# Patient Record
Sex: FEMALE | Race: WHITE | Employment: FULL TIME | URBAN - METROPOLITAN AREA
[De-identification: names, ages, dates, MRNs, and addresses within clinical notes are randomized per-mention and may not be internally consistent; named-entity substitution may affect disease eponyms.]

---

## 2017-04-25 PROBLEM — B00.9 HERPES SIMPLEX VIRUS TYPE 2 (HSV-2) INFECTION AFFECTING PREGNANCY IN FIRST TRIMESTER, ANTEPARTUM: Status: ACTIVE | Noted: 2017-04-25

## 2017-04-25 PROBLEM — N97.9 INFERTILITY, FEMALE: Status: ACTIVE | Noted: 2017-04-25

## 2017-04-25 PROBLEM — E03.9 ACQUIRED HYPOTHYROIDISM: Status: ACTIVE | Noted: 2017-04-25

## 2017-04-25 PROBLEM — O09.291 HISTORY OF PREMATURE RUPTURE OF MEMBRANES IN PREVIOUS PREGNANCY, CURRENTLY PREGNANT IN FIRST TRIMESTER: Status: ACTIVE | Noted: 2017-04-25

## 2017-04-25 PROBLEM — O09.291 HISTORY OF PREMATURE RUPTURE OF MEMBRANES IN PREVIOUS PREGNANCY, CURRENTLY PREGNANT IN FIRST TRIMESTER: Status: RESOLVED | Noted: 2017-04-25 | Resolved: 2017-04-25

## 2017-04-25 PROBLEM — Z34.90 PREGNANCY: Status: ACTIVE | Noted: 2017-04-25

## 2017-04-25 PROBLEM — O30.041 DICHORIONIC DIAMNIOTIC TWIN PREGNANCY IN FIRST TRIMESTER: Status: ACTIVE | Noted: 2017-04-25

## 2017-04-25 PROBLEM — Z82.79 FAMILY HISTORY OF CLEFT PALATE: Status: ACTIVE | Noted: 2017-04-25

## 2017-04-25 PROBLEM — O98.511 HERPES SIMPLEX VIRUS TYPE 2 (HSV-2) INFECTION AFFECTING PREGNANCY IN FIRST TRIMESTER, ANTEPARTUM: Status: ACTIVE | Noted: 2017-04-25

## 2017-04-25 PROBLEM — O09.891 HISTORY OF PRETERM DELIVERY, CURRENTLY PREGNANT IN FIRST TRIMESTER: Status: ACTIVE | Noted: 2017-04-25

## 2017-04-25 PROBLEM — O09.291 HISTORY OF PRETERM PREMATURE RUPTURE OF MEMBRANES (PROM) IN PREVIOUS PREGNANCY, CURRENTLY PREGNANT IN FIRST TRIMESTER: Status: ACTIVE | Noted: 2017-04-25

## 2017-04-25 PROBLEM — Z98.890 HISTORY OF LOOP ELECTRICAL EXCISION PROCEDURE (LEEP): Status: ACTIVE | Noted: 2017-04-25

## 2017-05-18 PROBLEM — E07.9 THYROID DYSFUNCTION IN PREGNANCY IN FIRST TRIMESTER: Status: ACTIVE | Noted: 2017-04-25

## 2017-05-18 PROBLEM — O35.2XX0: Status: ACTIVE | Noted: 2017-05-18

## 2017-05-18 PROBLEM — O99.281 THYROID DYSFUNCTION IN PREGNANCY IN FIRST TRIMESTER: Status: ACTIVE | Noted: 2017-04-25

## 2017-05-18 PROBLEM — O34.41 HISTORY OF LEEP (LOOP ELECTROSURGICAL EXCISION PROCEDURE) OF CERVIX COMPLICATING PREGNANCY IN FIRST TRIMESTER: Status: ACTIVE | Noted: 2017-04-25

## 2017-05-18 PROBLEM — O09.90 HRP (HIGH RISK PREGNANCY): Status: ACTIVE | Noted: 2017-04-25

## 2017-06-08 PROBLEM — O34.42 HISTORY OF LOOP ELECTROSURGICAL EXCISION PROCEDURE AFFECTING CARE OF MOTHER IN SECOND TRIMESTER, ANTEPARTUM: Status: ACTIVE | Noted: 2017-04-25

## 2017-06-08 PROBLEM — O09.92 HIGH-RISK PREGNANCY IN SECOND TRIMESTER: Status: ACTIVE | Noted: 2017-04-25

## 2017-06-08 PROBLEM — O09.292 HISTORY OF PRETERM PREMATURE RUPTURE OF MEMBRANES (PROM) IN PREVIOUS PREGNANCY, CURRENTLY PREGNANT IN SECOND TRIMESTER: Status: ACTIVE | Noted: 2017-04-25

## 2017-06-08 PROBLEM — O99.282 THYROID DYSFUNCTION IN PREGNANCY IN SECOND TRIMESTER: Status: ACTIVE | Noted: 2017-04-25

## 2017-06-08 PROBLEM — O98.512 HERPES SIMPLEX VIRUS TYPE 2 (HSV-2) INFECTION AFFECTING PREGNANCY IN SECOND TRIMESTER, ANTEPARTUM: Status: ACTIVE | Noted: 2017-04-25

## 2017-06-08 PROBLEM — O09.812 PREGNANCY RESULTING FROM ASSISTED REPRODUCTIVE TECHNOLOGY IN SECOND TRIMESTER: Status: ACTIVE | Noted: 2017-04-25

## 2017-06-08 PROBLEM — O44.02 PLACENTA PREVIA SPECIFIED AS WITHOUT HEMORRHAGE IN SECOND TRIMESTER: Status: ACTIVE | Noted: 2017-06-08

## 2017-06-08 PROBLEM — O09.212 PREVIOUS PRETERM DELIVERY IN SECOND TRIMESTER, ANTEPARTUM: Status: ACTIVE | Noted: 2017-04-25

## 2017-06-08 PROBLEM — O30.042 DICHORIONIC DIAMNIOTIC TWIN PREGNANCY IN SECOND TRIMESTER: Status: ACTIVE | Noted: 2017-04-25

## 2017-08-16 PROBLEM — O44.02 PLACENTA PREVIA SPECIFIED AS WITHOUT HEMORRHAGE IN SECOND TRIMESTER: Status: RESOLVED | Noted: 2017-06-08 | Resolved: 2017-08-16

## 2017-09-08 PROBLEM — O99.283 THYROID DYSFUNCTION IN PREGNANCY IN THIRD TRIMESTER: Status: ACTIVE | Noted: 2017-04-25

## 2017-09-08 PROBLEM — O09.213 PREVIOUS PRETERM DELIVERY IN THIRD TRIMESTER, ANTEPARTUM: Status: ACTIVE | Noted: 2017-04-25

## 2017-09-08 PROBLEM — O09.93 HIGH-RISK PREGNANCY IN THIRD TRIMESTER: Status: ACTIVE | Noted: 2017-04-25

## 2017-09-08 PROBLEM — O98.513 HERPES SIMPLEX VIRUS TYPE 2 (HSV-2) INFECTION AFFECTING PREGNANCY IN THIRD TRIMESTER, ANTEPARTUM: Status: ACTIVE | Noted: 2017-04-25

## 2017-09-08 PROBLEM — O09.293 HISTORY OF PRETERM PREMATURE RUPTURE OF MEMBRANES (PROM) IN PREVIOUS PREGNANCY, CURRENTLY PREGNANT IN THIRD TRIMESTER: Status: ACTIVE | Noted: 2017-04-25

## 2017-09-08 PROBLEM — O34.43 HISTORY OF LOOP ELECTROSURGICAL EXCISION PROCEDURE (LEEP) OF CERVIX AFFECTING PREGNANCY IN THIRD TRIMESTER: Status: ACTIVE | Noted: 2017-04-25

## 2017-09-08 PROBLEM — O09.813 PREGNANCY RESULTING FROM ASSISTED REPRODUCTIVE TECHNOLOGY IN THIRD TRIMESTER: Status: ACTIVE | Noted: 2017-04-25

## 2017-09-08 PROBLEM — O30.043 DICHORIONIC DIAMNIOTIC TWIN PREGNANCY IN THIRD TRIMESTER: Status: ACTIVE | Noted: 2017-04-25

## 2017-10-04 PROBLEM — Z23 ENCOUNTER FOR IMMUNIZATION: Status: ACTIVE | Noted: 2017-10-04

## 2017-10-09 ENCOUNTER — HOSPITAL ENCOUNTER (EMERGENCY)
Age: 32
Discharge: HOME | End: 2017-10-09
Attending: OBSTETRICS & GYNECOLOGY | Admitting: OBSTETRICS & GYNECOLOGY
Payer: COMMERCIAL

## 2017-10-09 VITALS
RESPIRATION RATE: 20 BRPM | SYSTOLIC BLOOD PRESSURE: 114 MMHG | HEIGHT: 65 IN | HEART RATE: 95 BPM | DIASTOLIC BLOOD PRESSURE: 63 MMHG

## 2017-10-09 PROBLEM — O47.03 PRETERM UTERINE CONTRACTIONS IN THIRD TRIMESTER, ANTEPARTUM: Status: ACTIVE | Noted: 2017-10-09

## 2017-10-09 LAB — FIBRONECTIN FETAL VAG QL: POSITIVE

## 2017-10-09 PROCEDURE — 59025 FETAL NON-STRESS TEST: CPT

## 2017-10-09 PROCEDURE — 99282 EMERGENCY DEPT VISIT SF MDM: CPT

## 2017-10-09 PROCEDURE — 87086 URINE CULTURE/COLONY COUNT: CPT | Performed by: OBSTETRICS & GYNECOLOGY

## 2017-10-09 PROCEDURE — 82731 ASSAY OF FETAL FIBRONECTIN: CPT | Performed by: OBSTETRICS & GYNECOLOGY

## 2017-10-09 NOTE — IP AVS SNAPSHOT
[address] 
[phone] Patient: Charla Mars MRN: [id number] FWU: You are allergic to the following No active allergies Recent Documentation Height OB Status Smoking Status 1.651 m Pregnant Never Smoker Unresulted Labs Order Current Status CULTURE, URINE In process Emergency Contacts Name Discharge Info Relation Home Work Mobile Nikki Mars  Parent [1] [phone] About your hospitalization You were admitted on:  N/A You last received care in the:  SFE 4 ANTEPARTUM You were discharged on:  2017 Unit phone number:  [phone] Why you were hospitalized Your primary diagnosis was:  Not on File Your diagnoses also included:   Uterine Contractions In Third Trimester, Antepartum Providers Seen During Your Hospitalizations Provider Role Specialty Primary office phone Cathi Pleitez DO Attending Provider Obstetrics & Gynecology [phone] Your Primary Care Physician (PCP) Primary Care Physician Office Phone Office Fax OTHER, PHYS ** None ** ** None ** Follow-up Information Follow up With Details Comments Contact Info Julia Mcintosh MD   Patient can only remember the practice name and not the physician Your Appointments 2017  8:30 AM EDT BPPx2 with [employer] US [address] [place] [address] [phone] 2017  9:15 AM EDT  
OB VISIT with Carlene Shoulders Alt, DO  
[address] [place] [address] [phone] 2017  8:30 AM EDT  
GROWTH TWINS with UM ULTRASOUND 3  
[facility] MATERNAL FETAL MEDICINE ([address]) [address] [place] [address] [address] [address]  
[phone] Friday October 20, 2017  9:30 AM EDT  
OB VISIT with Maye Waller MD  
[address] ([address]) [address] [facility] [address]  
[phone] Wednesday October 25, 2017 10:15 AM EDT BPPx2 with [employer] US [address] [address]  
[phone] Wednesday October 25, 2017 11:15 AM EDT  
OB VISIT with Hafsa Mederos MD  
[facility] ([address]) [address] [phone] Wednesday November 01, 2017  8:30 AM EDT BPPx2 with [employer] US [address] [phone] Wednesday November 01, 2017  9:30 AM EDT  
OB VISIT with Mandy Edmondson MD  
[facility] ([address]) [address] [phone] Wednesday November 08, 2017  8:30 AM EST BPPx2 with [employer] US [address] [address]  
[phone] Wednesday November 08, 2017  9:15 AM EST  
OB VISIT with Wanda Felty, MD  
[address] [address]  
[phone] Current Discharge Medication List  
  
ASK your doctor about these medications Dose & Instructions Dispensing Information Comments Morning Noon Evening Bedtime  
 ascorbic acid (vitamin C) 500 mg tablet Commonly known as:  VITAMIN C Your last dose was: Your next dose is: Take  by mouth. Refills:  0  
     
   
   
   
  
 aspirin 81 mg chewable tablet Your last dose was: Your next dose is:    
   
   
 Dose:  81 mg Take 81 mg by mouth daily. Refills:  0 CALCIUM PO Your last dose was: Your next dose is: Take  by mouth. Refills:  0  
     
   
   
   
  
 cholecalciferol 1,000 unit Cap Commonly known as:  VITAMIN D3 Your last dose was: Your next dose is: Take  by mouth daily. Refills:  0  
     
   
   
   
  
 folic acid 185 mcg tablet Your last dose was: Your next dose is:    
   
   
 Dose:  800 mcg Take 800 mcg by mouth daily. Refills:  0 IRON PO Your last dose was: Your next dose is: Take  by mouth. Refills:  0  
     
   
   
   
  
 levothyroxine 200 mcg tablet Commonly known as:  SYNTHROID Your last dose was: Your next dose is: Take  by mouth Daily (before breakfast). Refills:  0 PNV-DHA PO Your last dose was: Your next dose is: Take  by mouth. Refills:  0  
     
   
   
   
  
 valACYclovir 500 mg tablet Commonly known as:  VALTREX Your last dose was: Your next dose is: Take 1 tab BID x3d as needed. Quantity:  30 Tab Refills:  5 VITAMIN B COMPLEX NO.12-NIACIN PO Your last dose was: Your next dose is: Take  by mouth. Refills:  0 Discharge Instructions Weeks 32 to 34 of Your Pregnancy: Care Instructions Your Care Instructions During the last few weeks of your pregnancy, you may have more aches and pains. It's important to rest when you can. Your growing baby is putting more pressure on your bladder. So you may need to urinate more often. Hemorrhoids are also common. These are painful, itchy veins in the rectal area. In the 36th week, most women have a test for group B streptococcus (GBS). GBS is a common bacteria that can live in the vagina and rectum.  It can make your baby sick after birth. If you test positive, you will get antibiotics during labor. These will keep your baby from getting the bacteria. You may want to talk with your doctor about banking your baby's umbilical cord blood. This is the blood left in the cord after birth. If you want to save this blood, you must arrange it ahead of time. You can't decide at the last minute. If you haven't already had the Tdap shot during this pregnancy, talk to your doctor about getting it. It will help protect your  against pertussis infection. Follow-up care is a key part of your treatment and safety. Be sure to make and go to all appointments, and call your doctor if you are having problems. It's also a good idea to know your test results and keep a list of the medicines you take. How can you care for yourself at home? Ease hemorrhoids · Get more liquids, fruits, vegetables, and fiber in your diet. This will help keep your stools soft. · Avoid sitting for too long. Lie on your left side several times a day. · Clean yourself with soft, moist toilet paper. Or you can use witch hazel pads or personal hygiene pads. · If you are uncomfortable, try ice packs. Or you can sit in a warm sitz bath. Do these for 20 minutes at a time, as needed. · Use hydrocortisone cream for pain and itching. Two examples are Anusol and Preparation H Hydrocortisone. · Ask your doctor about taking an over-the-counter stool softener. Consider breastfeeding · Experts recommend that women breastfeed for 1 year or longer. Breast milk is the perfect food for babies. · Breast milk is easier for babies to digest than formula. And it is always available, just the right temperature, and free. · In general, babies who are  are healthier than formula-fed babies. ¨  babies are less likely to get ear infections, colds, diarrhea, and pneumonia.  
¨  babies who are fed only breast milk are less likely to get asthma and allergies. ¨  babies are less likely to be obese. ¨  babies are less likely to get diabetes or heart disease. · Women who breastfeed have less bleeding after the birth. Their uteruses also shrink back faster. · Some women who breastfeed lose weight faster. Making milk burns calories. · Breastfeeding can lower your risk of breast cancer, ovarian cancer, and osteoporosis. Decide about circumcision for boys · As you make this decision, it may help to think about your personal, Taoist, and family traditions. You get to decide if you will keep your son's penis natural or if he will be circumcised. · If you decide that you would like to have your baby circumcised, talk with your doctor. You can share your concerns about pain. And you can discuss your preferences for anesthesia. Where can you learn more? Go to http://sowmya-marisol.info/. Enter G327 in the search box to learn more about \"Weeks 32 to 34 of Your Pregnancy: Care Instructions. \" Current as of: March 16, 2017 Content Version: 11.3 © 7702-6156 [employer]. Care instructions adapted under license by [facility] (which disclaims liability or warranty for this information). If you have questions about a medical condition or this instruction, always ask your healthcare professional. [place] [address] any warranty or liability for your use of this information. Discharge Orders None Introducing [facility]! [employer] introduces [facility] patient portal. Now you can access parts of your medical record, email your doctor's office, and request medication refills online. 1. In your internet browser, go to [email or web address] 2. Click on the First Time User? Click Here link in the Sign In box. You will see the New Member Sign Up page. 3. Enter your [facility] Access Code exactly as it appears below.  You will not need to use this code after youve completed the sign-up process. If you do not sign up before the expiration date, you must request a new code. · "[employer]" Access Code: [email or web address] Expires: 1/7/2018  4:11 PM 
 
4. Enter the last four digits of your Social Security Number (xxxx) and Date of Birth (mm/dd/yyyy) as indicated and click Submit. You will be taken to the next sign-up page. 5. Create a "[employer]" ID. This will be your "[employer]" login ID and cannot be changed, so think of one that is secure and easy to remember. 6. Create a "[employer]" password. You can change your password at any time. 7. Enter your Password Reset Question and Answer. This can be used at a later time if you forget your password. 8. Enter your e-mail address. You will receive e-mail notification when new information is available in [address] 9. Click Sign Up. You can now view and download portions of your medical record. 10. Click the Download Summary menu link to download a portable copy of your medical information. If you have questions, please visit the Frequently Asked Questions section of the "[employer]" website. Remember, "[employer]" is NOT to be used for urgent needs. For medical emergencies, dial 911. Now available from your iPhone and Android! General Information Please provide this summary of care documentation to your next provider. Patient Signature:  ____________________________________________________________ Date:  ____________________________________________________________  
  
[facility] Provider Signature:  ____________________________________________________________ Date:  ____________________________________________________________

## 2017-10-09 NOTE — IP AVS SNAPSHOT
Summary of Care Report The Summary of Care report has been created to help improve care coordination. Users with access to [employer] or [address] (Web-based application) may access additional patient information including the Discharge Summary. If you are not currently a [address] user and need more information, please call the number listed below in the [place] [address] section and ask to be connected with Medical Records. Facility Information Name Address Phone [address] [place] [address] [address] [place] [address] [phone] Patient Information Patient Name Sex CYNTHIA Jovel Both ([id number]) Female 1985 Discharge Information Admitting Provider Service Area Unit Martinez Lynn MD / [facility] 4 Antepartum / [phone] Discharge Provider Discharge Date/Time Discharge Disposition Destination (none) 10/9/2017 (Pending) AHR (none) Patient Language Language ENGLISH [13] Hospital Problems as of 10/9/2017  Reviewed: 2017  9:26 AM by Anamaria Day MD  
  
  
  
 Class Noted - Resolved Last Modified POA Active Problems  uterine contractions in third trimester, antepartum  10/9/2017 - Present 10/9/2017 by Martinez Lynn MD Unknown Entered by Martinez Lynn MD  
  
Non-Hospital Problems as of 10/9/2017  Reviewed: 2017  9:26 AM by Anamaria Day MD  
  
  
  
 Class Noted - Resolved Last Modified Active Problems Pregnancy resulting from assisted reproductive technology in third trimester  2017 - Present 2017 by Charito Bernard RN Entered by Franky Whitlock Overview Addendum 2017  9:48 AM by Franky Whitlock Patient was referred to [facility] [address]  Patient states she had ovulation medication/shots x 1 round. Records requested. Previous  delivery in third trimester, antepartum  2017 - Present 2017 by Jana Mccall MD  
  Entered by Moncho Cabrera Overview Addendum 2017  9:19 AM by Jana Mccall MD  
    
2017 at [facility]:  CL not done today. Per patient was 3.3 in primary OB's office. · No additional endovaginal CL's needed. · PTL and cervical insufficiency precautions given. History of loop electrosurgical excision procedure (LEEP) of cervix affecting pregnancy in third trimester  2017 - Present 2017 by Steven Eisenmenger, RN Entered by Moncho Cabrera Overview Addendum 2017 10:15 AM by Steven Eisenmenger, RN Hx of LEEP in 2005 See  Delivery Overview Dichorionic diamniotic twin pregnancy in third trimester  2017 - Present 2017 by Jana Mccall MD  
  Entered by Moncho Ket Overview Addendum 2017  9:18 AM by Jana Mccall MD  
   Dichorionic/Diamniotic Twin Pregnancy which was secondary to infertility treatment 2017 at [facility]:  Appropriate fetal growth x 2, Normal GUILLE (DVP) x 2, Reassuring fetal status x 2 A:  AC 55%, overall 56%, GUILLE (DVP) 3.3 cm, UA Dopplers WNL, BPP 8/8 B:  AC 49%, overall 46%, GUILLE (DVP) 5.8 cm, UA Dopplers WNL, BPP 8/8 CL:  Not done · Repeat US at [facility] for growth in 3 weeks. · Plan delivery at 38 weeks. Thyroid dysfunction in pregnancy in third trimester  2017 - Present 2017 by Jana Mccall MD  
  Entered by Moncho Cabrera Overview Addendum 2017  9:25 AM by Jana Mccall MD  
   Patient is followed by an Endocrinologist, Dr. Pamela Galindo. She recently had blood work drawn. Patient signed records release to have results sent to our office TSH 17-3.47 T4-11.0 [facility] Labs 2017 TSH 3.47 (H) 0.56   0.33 FT4 --  --  0.82    
 
 
 2017 at [facility]:  Currently taking Synthroid 200 mcg Mon to Fri and 175 mcg Sat and Sun.  TFT's not available at today's visit. · Recommend evaluation of FT4 each trimester. In pregnancy, want in high normal range. Herpes simplex virus type 2 (HSV-2) infection affecting pregnancy in third trimester, antepartum  2017 - Present 2017 by Sheila Odonnell RN Entered by Justo Friends Overview Signed 2017  9:50 AM by Justo Castro 36 weeks Valtrex _____ Family history of cleft palate  2017 - Present 2017 by Joseph Fowler MD  
  Entered by AdaMandae Overview Addendum 2017 11:14 AM by Germán Garcia MD  
   Patient's father [id number] anatomy at [facility] to be scheduled. History of  premature rupture of membranes (PROM) in previous pregnancy, currently pregnant in third trimester  2017 - Present 2017 by Germán Garcia MD  
  Entered by Justo Offermobi Overview Addendum 2017 11:52 AM by Germán Garcia MD  
   Patient delivered at  35 weeks x 2. High-risk pregnancy in third trimester  2017 - Present 10/4/2017 by Marianne Caruso DO Entered by Ada Offermobi Overview Addendum 10/4/2017  9:05 AM by Marianne Caruso DO Patient declines genetic testing 28 week glucola __93___ Previous child born with ventricular septal defect, currently pregnant  2017 - Present 2017 by Germán Garcia MD  
  Entered by Jennifer Fowler Overview Addendum 2017 12:42 PM by Germán Garcia MD  
   2017-[facility]-Normal echo on both twins today. · Repeat echos in 6 weeks at [facility] Encounter for immunization  10/4/2017 - Present 10/4/2017 by Iman Mckinley Entered by Iman Mckinley Overview Signed 10/4/2017  9:22 AM by Iman Mckinley Flu vaccine given on 10/04/17 You are allergic to the following No active allergies Current Discharge Medication List  
  
ASK your doctor about these medications Dose & Instructions Dispensing Information Comments  
 ascorbic acid (vitamin C) 500 mg tablet Commonly known as:  VITAMIN C Take  by mouth. Refills:  0  
   
 aspirin 81 mg chewable tablet Dose:  81 mg Take 81 mg by mouth daily. Refills:  0  
   
 CALCIUM PO Take  by mouth. Refills:  0  
   
 cholecalciferol 1,000 unit Cap Commonly known as:  VITAMIN D3 Take  by mouth daily. Refills:  0  
   
 folic acid 011 mcg tablet Dose:  800 mcg Take 800 mcg by mouth daily. Refills:  0 IRON PO Take  by mouth. Refills:  0  
   
 levothyroxine 200 mcg tablet Commonly known as:  SYNTHROID Take  by mouth Daily (before breakfast). Refills:  0 PNV-DHA PO Take  by mouth. Refills:  0  
   
 valACYclovir 500 mg tablet Commonly known as:  VALTREX Take 1 tab BID x3d as needed. Quantity:  30 Tab Refills:  5 VITAMIN B COMPLEX NO.12-NIACIN PO Take  by mouth. Refills:  0 Current Immunizations Name Date Influenza Vaccine (Quad) Mdck Pf 10/4/2017 Influenza Vaccine PF 1/23/2013 Tdap 1/23/2013 Follow-up Information Follow up With Details Comments Contact Info Phys Other, MD   Patient can only remember the practice name and not the physician Discharge Instructions Weeks 32 to 34 of Your Pregnancy: Care Instructions Your Care Instructions During the last few weeks of your pregnancy, you may have more aches and pains. It's important to rest when you can. Your growing baby is putting more pressure on your bladder. So you may need to urinate more often. Hemorrhoids are also common. These are painful, itchy veins in the rectal area. In the 36th week, most women have a test for group B streptococcus (GBS). GBS is a common bacteria that can live in the vagina and rectum.  It can make your baby sick after birth. If you test positive, you will get antibiotics during labor. These will keep your baby from getting the bacteria. You may want to talk with your doctor about banking your baby's umbilical cord blood. This is the blood left in the cord after birth. If you want to save this blood, you must arrange it ahead of time. You can't decide at the last minute. If you haven't already had the Tdap shot during this pregnancy, talk to your doctor about getting it. It will help protect your  against pertussis infection. Follow-up care is a key part of your treatment and safety. Be sure to make and go to all appointments, and call your doctor if you are having problems. It's also a good idea to know your test results and keep a list of the medicines you take. How can you care for yourself at home? Ease hemorrhoids · Get more liquids, fruits, vegetables, and fiber in your diet. This will help keep your stools soft. · Avoid sitting for too long. Lie on your left side several times a day. · Clean yourself with soft, moist toilet paper. Or you can use witch hazel pads or personal hygiene pads. · If you are uncomfortable, try ice packs. Or you can sit in a warm sitz bath. Do these for 20 minutes at a time, as needed. · Use hydrocortisone cream for pain and itching. Two examples are Anusol and Preparation H Hydrocortisone. · Ask your doctor about taking an over-the-counter stool softener. Consider breastfeeding · Experts recommend that women breastfeed for 1 year or longer. Breast milk is the perfect food for babies. · Breast milk is easier for babies to digest than formula. And it is always available, just the right temperature, and free. · In general, babies who are  are healthier than formula-fed babies. ¨  babies are less likely to get ear infections, colds, diarrhea, and pneumonia.  
¨  babies who are fed only breast milk are less likely to get asthma and allergies. ¨  babies are less likely to be obese. ¨  babies are less likely to get diabetes or heart disease. · Women who breastfeed have less bleeding after the birth. Their uteruses also shrink back faster. · Some women who breastfeed lose weight faster. Making milk burns calories. · Breastfeeding can lower your risk of breast cancer, ovarian cancer, and osteoporosis. Decide about circumcision for boys · As you make this decision, it may help to think about your personal, Hindu, and family traditions. You get to decide if you will keep your son's penis natural or if he will be circumcised. · If you decide that you would like to have your baby circumcised, talk with your doctor. You can share your concerns about pain. And you can discuss your preferences for anesthesia. Where can you learn more? Go to http://sowmya-marisol.info/. Enter H443 in the search box to learn more about \"Weeks 32 to 34 of Your Pregnancy: Care Instructions. \" Current as of: March 16, 2017 Content Version: 11.3 © 8434-4308 [employer]. Care instructions adapted under license by [employer] (which disclaims liability or warranty for this information). If you have questions about a medical condition or this instruction, always ask your healthcare professional. [place] [address] any warranty or liability for your use of this information. Chart Review Routing History No Routing History on File

## 2017-10-09 NOTE — PROGRESS NOTES
Pt presents to unit with c/o contractions, lower back pain and abdominal pressure, starting last night, worsening today. EFM and toco applied. Large amt of ketones and moderate leukocytes noted in urine dip. Dr. Jacqueline Thompson in to see pt. Report to MARIANNA Luke RN, care relinquished.

## 2017-10-09 NOTE — PROGRESS NOTES
Dr Caridad Roberto at . FFN collected and SVE. Closed. Urine dipped- large ketones and mod leukocytes. Orders for UA clean catch, crackers, jucie/ water. Per Dr Caridad Roberto- babies reactive.  cardios removed, toco remains in place

## 2017-10-09 NOTE — ED PROVIDER NOTES
Chief Complaint:  contractions    28 y.o. female [id number] with twins at 33w2d  weeks gestation who is seen for mild abdominal pain. Pt reports contractions last night that resolved. Today reports \"i lost my mucous plug\". Currently having mild cramping. No vag bleeding or loss of fluid. Good fetal movement X2        HISTORY:    History   Sexual Activity    Sexual activity: Yes    Partners: Male    Birth control/ protection: None     Patient's last menstrual period was 2017. Social History     Social History    Marital status: SINGLE     Spouse name: N/A    Number of children: N/A    Years of education: N/A     Occupational History    Not on file.      Social History Main Topics    Smoking status: Never Smoker    Smokeless tobacco: Never Used    Alcohol use No    Drug use: No    Sexual activity: Yes     Partners: Male     Birth control/ protection: None     Other Topics Concern    Not on file     Social History Narrative       Past Surgical History:   Procedure Laterality Date    BREAST SURGERY PROCEDURE UNLISTED      breast augmentation     HX BREAST AUGMENTATION  2008    HX COLPOSCOPY      HX LEEP PROCEDURE      HX OTHER SURGICAL      Fulguration of Endometriosis    HX OTHER SURGICAL  2015    Bx to intestines (celiac dz)    HX PELVIC LAPAROSCOPY  , ,     HX WISDOM TEETH EXTRACTION  2007       Past Medical History:   Diagnosis Date    Abnormal Pap smear 2011 & 2015    HPV    Abnormal Papanicolaou smear of cervix     2005     2004    Anemia     Asthma     childhood    Celiac disease     Depression     no meds    Endometriosis     Female dyspareunia     Herpes simplex without mention of complication     Type 2    History of hyperthyroidism 3/2014    treated w/radiation    Hypothyroidism     managed w/med    Infertility, female     Miscarriage     Pelvic pain in female 10/8/2015         ROS:  A 12 point review of symptoms negative except for chief complaint as described above. PHYSICAL EXAM:  Blood pressure 114/63, pulse 95, resp. rate 20, height 5' 5\" (1.651 m), last menstrual period 2017. Constitutional: The patient appears well, alert, oriented x 3. Cardiovascular: Heart RRR, no murmurs.    Respiratory: Lungs clear, no respiratory distress  GI: Abdomen soft, nontender, no guarding  No fundal tenderness  Musculoskeletal: no cva tenderness  Upper ext: no edema, reflexes +2  Lower ext: no edema, neg hussain's, reflexes +2  Skin: no rashes or lesions  Psychiatric:Mood/ Affect: appropriate  Genitourinary: SVE:cl/th  FHT:reactive X 2  TOCO:iritability, no contractions    I personally reviewed pt's medical record including relevant labs    ffn +    Assessment/Plan:  29 yo [id number] at 33w2d with  contractions without cervical change  Resolved  FFN+  Labor precautions- keep f/u as scheduled Wednesday or sooner if needed  + urine ketones- encouraged hydration  Urine culture sent

## 2017-10-09 NOTE — PROGRESS NOTES
Dr. Monica Crowley at [facility], [address] [facility] discussed, pt verbalizes understanding. Discharge orders received.

## 2017-10-12 LAB
BACTERIA SPEC CULT: NORMAL
SERVICE CMNT-IMP: NORMAL

## 2017-10-16 ENCOUNTER — ANESTHESIA (OUTPATIENT)
Dept: LABOR AND DELIVERY | Age: 32
End: 2017-10-16
Payer: COMMERCIAL

## 2017-10-16 ENCOUNTER — ANESTHESIA EVENT (OUTPATIENT)
Dept: LABOR AND DELIVERY | Age: 32
End: 2017-10-16
Payer: COMMERCIAL

## 2017-10-16 ENCOUNTER — HOSPITAL ENCOUNTER (INPATIENT)
Age: 32
LOS: 3 days | Discharge: HOME | End: 2017-10-19
Attending: OBSTETRICS & GYNECOLOGY | Admitting: OBSTETRICS & GYNECOLOGY
Payer: COMMERCIAL

## 2017-10-16 DIAGNOSIS — O60.03 PRETERM LABOR IN THIRD TRIMESTER WITHOUT DELIVERY: Primary | ICD-10-CM

## 2017-10-16 DIAGNOSIS — Z98.891 S/P CESAREAN SECTION: ICD-10-CM

## 2017-10-16 PROBLEM — O26.893 ABDOMINAL PAIN DURING PREGNANCY, THIRD TRIMESTER: Status: ACTIVE | Noted: 2017-10-16

## 2017-10-16 PROBLEM — O30.003 TWIN GESTATION IN THIRD TRIMESTER: Status: ACTIVE | Noted: 2017-10-16

## 2017-10-16 PROBLEM — Z34.90 PREGNANCY: Status: ACTIVE | Noted: 2017-10-16

## 2017-10-16 PROBLEM — R10.9 ABDOMINAL PAIN DURING PREGNANCY, THIRD TRIMESTER: Status: ACTIVE | Noted: 2017-10-16

## 2017-10-16 LAB
ABO + RH BLD: NORMAL
BASE DEFICIT BLDCOA-SCNC: 3.1 MMOL/L (ref 0–2)
BASE DEFICIT BLDCOV-SCNC: 0.5 MMOL/L (ref 1.9–7.7)
BASE DEFICIT BLDCOV-SCNC: 1.6 MMOL/L (ref 1.9–7.7)
BDY SITE: ABNORMAL
BLOOD GROUP ANTIBODIES SERPL: NORMAL
ERYTHROCYTE [DISTWIDTH] IN BLOOD BY AUTOMATED COUNT: 13.3 % (ref 11.9–14.6)
HCO3 BLDCOA-SCNC: 24 MMOL/L (ref 22–26)
HCO3 BLDV-SCNC: 23 MMOL/L
HCO3 BLDV-SCNC: 24 MMOL/L
HCT VFR BLD AUTO: 41.2 % (ref 35.8–46.3)
HGB BLD-MCNC: 14.4 G/DL (ref 11.7–15.4)
MCH RBC QN AUTO: 33.3 PG (ref 26.1–32.9)
MCHC RBC AUTO-ENTMCNC: 35 G/DL (ref 31.4–35)
MCV RBC AUTO: 95.4 FL (ref 79.6–97.8)
PCO2 BLDCOA: 49 MMHG (ref 33–49)
PCO2 BLDCOV: 36 MMHG (ref 14.1–43.3)
PCO2 BLDCOV: 43 MMHG (ref 14.1–43.3)
PH BLDCOA: 7.3 [PH] (ref 7.21–7.31)
PH BLDCOV: 7.36 [PH] (ref 7.2–7.44)
PH BLDCOV: 7.43 [PH] (ref 7.2–7.44)
PLATELET # BLD AUTO: 155 K/UL (ref 150–450)
PMV BLD AUTO: 11.6 FL (ref 10.8–14.1)
PO2 BLDCOA: 22 MMHG (ref 9–19)
PO2 BLDV: 27 MMHG (ref 30.4–57.2)
PO2 BLDV: 41 MMHG (ref 30.4–57.2)
RBC # BLD AUTO: 4.32 M/UL (ref 4.05–5.25)
SERVICE CMNT-IMP: ABNORMAL
SPECIMEN EXP DATE BLD: NORMAL
WBC # BLD AUTO: 11.3 K/UL (ref 4.3–11.1)

## 2017-10-16 PROCEDURE — 74011250636 HC RX REV CODE- 250/636: Performed by: OBSTETRICS & GYNECOLOGY

## 2017-10-16 PROCEDURE — 99283 EMERGENCY DEPT VISIT LOW MDM: CPT

## 2017-10-16 PROCEDURE — 85027 COMPLETE CBC AUTOMATED: CPT | Performed by: OBSTETRICS & GYNECOLOGY

## 2017-10-16 PROCEDURE — 74011250637 HC RX REV CODE- 250/637: Performed by: ANESTHESIOLOGY

## 2017-10-16 PROCEDURE — 77030007880 HC KT SPN EPDRL BBMI -B: Performed by: ANESTHESIOLOGY

## 2017-10-16 PROCEDURE — 74011250636 HC RX REV CODE- 250/636: Performed by: ANESTHESIOLOGY

## 2017-10-16 PROCEDURE — 77030009363 HC CUP VAC EXTRCT CNCI -B: Performed by: OBSTETRICS & GYNECOLOGY

## 2017-10-16 PROCEDURE — 74011000250 HC RX REV CODE- 250: Performed by: ANESTHESIOLOGY

## 2017-10-16 PROCEDURE — 86900 BLOOD TYPING SEROLOGIC ABO: CPT | Performed by: OBSTETRICS & GYNECOLOGY

## 2017-10-16 PROCEDURE — 74011250636 HC RX REV CODE- 250/636

## 2017-10-16 PROCEDURE — 77030005518 HC CATH URETH FOL 2W BARD -B

## 2017-10-16 PROCEDURE — 65270000029 HC RM PRIVATE

## 2017-10-16 PROCEDURE — 59025 FETAL NON-STRESS TEST: CPT

## 2017-10-16 PROCEDURE — 77030003665 HC NDL SPN BBMI -A: Performed by: ANESTHESIOLOGY

## 2017-10-16 PROCEDURE — 75410000003 HC RECOV DEL/VAG/CSECN EA 0.5 HR: Performed by: OBSTETRICS & GYNECOLOGY

## 2017-10-16 PROCEDURE — 82803 BLOOD GASES ANY COMBINATION: CPT

## 2017-10-16 PROCEDURE — 76010000392 HC C SECN EA ADDL 0.5 HR: Performed by: OBSTETRICS & GYNECOLOGY

## 2017-10-16 PROCEDURE — 77030002916 HC SUT ETHLN J&J -A: Performed by: OBSTETRICS & GYNECOLOGY

## 2017-10-16 PROCEDURE — 76060000078 HC EPIDURAL ANESTHESIA: Performed by: OBSTETRICS & GYNECOLOGY

## 2017-10-16 PROCEDURE — 74011000258 HC RX REV CODE- 258: Performed by: OBSTETRICS & GYNECOLOGY

## 2017-10-16 PROCEDURE — 77030032490 HC SLV COMPR SCD KNE COVD -B

## 2017-10-16 PROCEDURE — 74011000250 HC RX REV CODE- 250: Performed by: OBSTETRICS & GYNECOLOGY

## 2017-10-16 PROCEDURE — 4A1HXCZ MONITORING OF PRODUCTS OF CONCEPTION, CARDIAC RATE, EXTERNAL APPROACH: ICD-10-PCS | Performed by: OBSTETRICS & GYNECOLOGY

## 2017-10-16 PROCEDURE — 74011000250 HC RX REV CODE- 250

## 2017-10-16 PROCEDURE — 76010000391 HC C SECN FIRST 1 HR: Performed by: OBSTETRICS & GYNECOLOGY

## 2017-10-16 RX ORDER — CEFAZOLIN SODIUM IN 0.9 % NACL 2 G/50 ML
2 INTRAVENOUS SOLUTION, PIGGYBACK (ML) INTRAVENOUS ONCE
Status: COMPLETED | OUTPATIENT
Start: 2017-10-16 | End: 2017-10-16

## 2017-10-16 RX ORDER — TRISODIUM CITRATE DIHYDRATE AND CITRIC ACID MONOHYDRATE 500; 334 MG/5ML; MG/5ML
30 SOLUTION ORAL ONCE
Status: COMPLETED | OUTPATIENT
Start: 2017-10-16 | End: 2017-10-16

## 2017-10-16 RX ORDER — TERBUTALINE SULFATE 1 MG/ML
0.25 INJECTION SUBCUTANEOUS
Status: DISCONTINUED | OUTPATIENT
Start: 2017-10-16 | End: 2017-10-16

## 2017-10-16 RX ORDER — DEXTROSE, SODIUM CHLORIDE, SODIUM LACTATE, POTASSIUM CHLORIDE, AND CALCIUM CHLORIDE 5; .6; .31; .03; .02 G/100ML; G/100ML; G/100ML; G/100ML; G/100ML
125 INJECTION, SOLUTION INTRAVENOUS CONTINUOUS
Status: DISCONTINUED | OUTPATIENT
Start: 2017-10-16 | End: 2017-10-16 | Stop reason: HOSPADM

## 2017-10-16 RX ORDER — SODIUM CHLORIDE 0.9 % (FLUSH) 0.9 %
5-10 SYRINGE (ML) INJECTION AS NEEDED
Status: DISCONTINUED | OUTPATIENT
Start: 2017-10-16 | End: 2017-10-16 | Stop reason: HOSPADM

## 2017-10-16 RX ORDER — LEVOTHYROXINE SODIUM 100 UG/1
200 TABLET ORAL
Status: DISCONTINUED | OUTPATIENT
Start: 2017-10-17 | End: 2017-10-17

## 2017-10-16 RX ORDER — OXYTOCIN/RINGER'S LACTATE 30/500 ML
PLASTIC BAG, INJECTION (ML) INTRAVENOUS
Status: DISCONTINUED | OUTPATIENT
Start: 2017-10-16 | End: 2017-10-16 | Stop reason: HOSPADM

## 2017-10-16 RX ORDER — OXYTOCIN/RINGER'S LACTATE 30/500 ML
250 PLASTIC BAG, INJECTION (ML) INTRAVENOUS ONCE
Status: DISCONTINUED | OUTPATIENT
Start: 2017-10-16 | End: 2017-10-16 | Stop reason: HOSPADM

## 2017-10-16 RX ORDER — KETOROLAC TROMETHAMINE 30 MG/ML
INJECTION, SOLUTION INTRAMUSCULAR; INTRAVENOUS AS NEEDED
Status: DISCONTINUED | OUTPATIENT
Start: 2017-10-16 | End: 2017-10-16 | Stop reason: HOSPADM

## 2017-10-16 RX ORDER — SODIUM CHLORIDE 0.9 % (FLUSH) 0.9 %
5-10 SYRINGE (ML) INJECTION EVERY 8 HOURS
Status: DISCONTINUED | OUTPATIENT
Start: 2017-10-16 | End: 2017-10-16 | Stop reason: HOSPADM

## 2017-10-16 RX ORDER — OXYCODONE HYDROCHLORIDE 5 MG/1
10 TABLET ORAL
Status: DISCONTINUED | OUTPATIENT
Start: 2017-10-16 | End: 2017-10-17 | Stop reason: ALTCHOICE

## 2017-10-16 RX ORDER — HYDROMORPHONE HYDROCHLORIDE 1 MG/ML
1 INJECTION, SOLUTION INTRAMUSCULAR; INTRAVENOUS; SUBCUTANEOUS
Status: DISCONTINUED | OUTPATIENT
Start: 2017-10-16 | End: 2017-10-17

## 2017-10-16 RX ORDER — METOCLOPRAMIDE HYDROCHLORIDE 5 MG/ML
10 INJECTION INTRAMUSCULAR; INTRAVENOUS ONCE
Status: DISCONTINUED | OUTPATIENT
Start: 2017-10-16 | End: 2017-10-16 | Stop reason: HOSPADM

## 2017-10-16 RX ORDER — SODIUM CHLORIDE, SODIUM LACTATE, POTASSIUM CHLORIDE, CALCIUM CHLORIDE 600; 310; 30; 20 MG/100ML; MG/100ML; MG/100ML; MG/100ML
INJECTION, SOLUTION INTRAVENOUS
Status: DISCONTINUED | OUTPATIENT
Start: 2017-10-16 | End: 2017-10-16 | Stop reason: HOSPADM

## 2017-10-16 RX ORDER — MORPHINE SULFATE 0.5 MG/ML
INJECTION, SOLUTION EPIDURAL; INTRATHECAL; INTRAVENOUS AS NEEDED
Status: DISCONTINUED | OUTPATIENT
Start: 2017-10-16 | End: 2017-10-16 | Stop reason: HOSPADM

## 2017-10-16 RX ORDER — NALOXONE HYDROCHLORIDE 0.4 MG/ML
0.2 INJECTION, SOLUTION INTRAMUSCULAR; INTRAVENOUS; SUBCUTANEOUS
Status: DISCONTINUED | OUTPATIENT
Start: 2017-10-16 | End: 2017-10-17

## 2017-10-16 RX ORDER — KETOROLAC TROMETHAMINE 30 MG/ML
30 INJECTION, SOLUTION INTRAMUSCULAR; INTRAVENOUS
Status: DISCONTINUED | OUTPATIENT
Start: 2017-10-16 | End: 2017-10-17

## 2017-10-16 RX ORDER — NALBUPHINE HYDROCHLORIDE 10 MG/ML
5 INJECTION, SOLUTION INTRAMUSCULAR; INTRAVENOUS; SUBCUTANEOUS
Status: DISCONTINUED | OUTPATIENT
Start: 2017-10-16 | End: 2017-10-17

## 2017-10-16 RX ORDER — ONDANSETRON 2 MG/ML
INJECTION INTRAMUSCULAR; INTRAVENOUS AS NEEDED
Status: DISCONTINUED | OUTPATIENT
Start: 2017-10-16 | End: 2017-10-16 | Stop reason: HOSPADM

## 2017-10-16 RX ORDER — BUPIVACAINE HYDROCHLORIDE 7.5 MG/ML
INJECTION, SOLUTION INTRASPINAL AS NEEDED
Status: DISCONTINUED | OUTPATIENT
Start: 2017-10-16 | End: 2017-10-16 | Stop reason: HOSPADM

## 2017-10-16 RX ORDER — SODIUM CHLORIDE, SODIUM LACTATE, POTASSIUM CHLORIDE, CALCIUM CHLORIDE 600; 310; 30; 20 MG/100ML; MG/100ML; MG/100ML; MG/100ML
100 INJECTION, SOLUTION INTRAVENOUS CONTINUOUS
Status: DISCONTINUED | OUTPATIENT
Start: 2017-10-17 | End: 2017-10-17

## 2017-10-16 RX ADMIN — Medication 500 ML/HR: at 20:52

## 2017-10-16 RX ADMIN — MORPHINE SULFATE 200 MCG: 0.5 INJECTION, SOLUTION EPIDURAL; INTRATHECAL; INTRAVENOUS at 20:32

## 2017-10-16 RX ADMIN — BUPIVACAINE HYDROCHLORIDE 1.6 ML: 7.5 INJECTION, SOLUTION INTRASPINAL at 20:32

## 2017-10-16 RX ADMIN — ONDANSETRON 4 MG: 2 INJECTION INTRAMUSCULAR; INTRAVENOUS at 20:52

## 2017-10-16 RX ADMIN — FAMOTIDINE 20 MG: 10 INJECTION, SOLUTION INTRAVENOUS at 19:50

## 2017-10-16 RX ADMIN — KETOROLAC TROMETHAMINE 30 MG: 30 INJECTION, SOLUTION INTRAMUSCULAR; INTRAVENOUS at 21:12

## 2017-10-16 RX ADMIN — PENICILLIN G POTASSIUM 5 MILLION UNITS: 5000000 INJECTION, POWDER, FOR SOLUTION INTRAMUSCULAR; INTRAVENOUS at 18:48

## 2017-10-16 RX ADMIN — CEFAZOLIN 2 G: 1 INJECTION, POWDER, FOR SOLUTION INTRAMUSCULAR; INTRAVENOUS; PARENTERAL at 20:12

## 2017-10-16 RX ADMIN — SODIUM CITRATE AND CITRIC ACID MONOHYDRATE 30 ML: 500; 334 SOLUTION ORAL at 20:12

## 2017-10-16 RX ADMIN — SODIUM CHLORIDE, SODIUM LACTATE, POTASSIUM CHLORIDE, CALCIUM CHLORIDE: 600; 310; 30; 20 INJECTION, SOLUTION INTRAVENOUS at 20:23

## 2017-10-16 RX ADMIN — SODIUM CHLORIDE, SODIUM LACTATE, POTASSIUM CHLORIDE, AND CALCIUM CHLORIDE 1000 ML: 600; 310; 30; 20 INJECTION, SOLUTION INTRAVENOUS at 19:11

## 2017-10-16 RX ADMIN — Medication 1 AMPULE: at 20:12

## 2017-10-16 NOTE — PROGRESS NOTES
History & Physical    Name: Sarah Louise MRN: [id number]  SSN: [id number]    YOB: 1985  Age: 28 y.o. Sex: female      Subjective:     Estimated Date of Delivery: 17  OB History    Para Term  AB Living   5 2  2 2 2   SAB TAB Ectopic Molar Multiple Live Births   1 0    2      # Outcome Date GA Lbr Rito/2nd Weight Sex Delivery Anes PTL Lv   5 Current            4  13 34w6d  5 lb 4.7 oz (2.4 kg) F VAGINAL DELI EPIDURAL AN Y CARLTON      Birth Comments: PPROM; 17P injections   3  09 35w1d  5 lb 3 oz (2.353 kg) M Vag-Spont  Y CARLTON      Birth Comments: Dilated to 3 cm on admission   2 2008 4w0d          1 AB 2004              Obstetric Comments   Established patient presents to the office for NOB talk and ultrasound. Infertility patient was referred by our office to [facility], but patient states that insurance did not cover treatment there. She did not finish a complete cycle with them. She then trans   ferred to The [address] [place] [address] where she received ovulation medication (name unknown) and injections. Patient states she completed one round of treatment prior to becoming pregnant with Di/Di twins. Records have been requested. She has    a history of hypothyroidism and is currently followed by an Endocrinologist. Records release was obtained today to have recent lab work sent to our office. In  patient had a pre-term delivery at 35 weeks gestation. Patient states cause was unknown. I   n  she received 17 HP injections with her pregnancy. At 34w6d patient delivered due to PPROM. Referral to [facility] was made. Patient declines all genetic testing. She is to return in 3 weeks for NOB exam and FHTs by ultrasound. All questions answered and patient voiced full understanding. Ms. Mclaughlin Dural admitted with pregnancy at 34w2d for  section due to multiple gestation and active  labor. Prenatal course was complicated by twins.  Please see prenatal records for details. Past Medical History:   Diagnosis Date    Abnormal Pap smear 2011 & 2015    HPV    Abnormal Papanicolaou smear of cervix     2005     2004    Anemia     Asthma     childhood    Celiac disease     Depression     no meds    Endometriosis     Female dyspareunia     Herpes simplex without mention of complication     Type 2    History of hyperthyroidism 3/2014    treated w/radiation    Hypothyroidism     managed w/med    Infertility, female     Miscarriage     Pelvic pain in female 10/8/2015     labor in third trimester without delivery 10/16/2017     Past Surgical History:   Procedure Laterality Date    BREAST SURGERY PROCEDURE UNLISTED      breast augmentation     HX BREAST AUGMENTATION  2008    HX COLPOSCOPY      HX LEEP PROCEDURE      HX OTHER SURGICAL      Fulguration of Endometriosis    HX OTHER SURGICAL  2015    Bx to intestines (celiac dz)    HX PELVIC LAPAROSCOPY  , ,     HX WISDOM TEETH EXTRACTION  2007     Social History     Occupational History    Not on file.      Social History Main Topics    Smoking status: Never Smoker    Smokeless tobacco: Never Used    Alcohol use No    Drug use: No    Sexual activity: Yes     Partners: Male     Birth control/ protection: None     Family History   Problem Relation Age of Onset    Stroke Maternal Uncle     Heart Disease Maternal Uncle     Thyroid Disease Maternal Uncle     Stroke Maternal Grandfather     Cancer Maternal Grandfather      brain cancer    Cancer Paternal Grandmother      breast    Ovarian Cancer Paternal Grandmother     Cancer Paternal Aunt      breast    Other Mother      depression    Stroke Maternal Grandmother     Heart Disease Paternal Grandfather     Thyroid Disease Other      hyperthyroidism    Thyroid Disease Other      hyperthyroidism    Heart defect Sister      Hole in Heart (closed)    Other Father      Cleft Palate No Known Allergies  Prior to Admission medications    Medication Sig Start Date End Date Taking? Authorizing Provider   aspirin 81 mg chewable tablet Take 81 mg by mouth daily. Historical Provider   ascorbic acid, vitamin C, (VITAMIN C) 500 mg tablet Take  by mouth. Historical Provider   folic acid 291 mcg tablet Take 800 mcg by mouth daily. Historical Provider   cholecalciferol (VITAMIN D3) 1,000 unit cap Take  by mouth daily. Historical Provider   levothyroxine (SYNTHROID) 200 mcg tablet Take  by mouth Daily (before breakfast). Historical Provider   PNV COMBO#47/IRON/FA #1/DHA (PNV-DHA PO) Take  by mouth. Historical Provider   VITAMIN B COMPLEX NO.12-NIACIN PO Take  by mouth. Historical Provider   CALCIUM PO Take  by mouth. Historical Provider   FERROUS SULFATE (IRON PO) Take  by mouth. Historical Provider   valACYclovir (VALTREX) 500 mg tablet Take 1 tab BID x3d as needed. 3/22/17   Danish Green NP        Review of Systems: A comprehensive review of systems was negative except for that written in the History of Present Illness. Objective:     Vitals:  Vitals:    10/16/17 1746   BP: 114/75   Pulse: (!) 126   Resp: 18   Temp: 98.8 °F (37.1 °C)        Physical Exam:  Patient without distress. Heart: Regular rate and rhythm or S1S2 present  Lung: clear to auscultation throughout lung fields, no wheezes, no rales, no rhonchi and normal respiratory effort  Abdomen: soft, nontender  Lower Extremities:  - Edema No  Membranes:  Intact  Fetal Heart Rate: Reactive x 2    Prenatal Labs:   Lab Results   Component Value Date/Time    ABO/Rh(D) O POS 2013 01:21 PM    Rubella, External immune 2017    GrBStrep, External negative 2013    HBsAg, External neg 2017    HIV, External NR 2017    RPR, External nr 2017    ABO,Rh O Positive 2017         Impression/Plan:     Plan:  Admit for  section. Group B Strep was not tested.  Discussed the risks of surgery including the risks of bleeding, infection, deep vein thrombosis, and surgical injuries to internal organs including but not limited to the bowels, bladder, rectum, and female reproductive organs. The patient understands the risks; any and all questions were answered to the patient's satisfaction. Will start pcn for gbs prophylaxsis / terbutaline x 1.      Signed By:  Trupti January, DO October 16, 2017

## 2017-10-16 NOTE — IP AVS SNAPSHOT
[address] 
[phone] Patient: Carlene Mars MRN: [id number] You are allergic to the following No active allergies Recent Documentation Breastfeeding? OB Status Smoking Status No Pregnant Never Smoker Emergency Contacts Name Discharge Info Relation Home Work Mobile Nikki Mars  Parent [1] [phone] About your hospitalization You were admitted on:  2017 You last received care in the:  [address] [facility] You were discharged on:  2017 Unit phone number:  [phone] Why you were hospitalized Your primary diagnosis was:   Labor In Third Trimester Without Delivery Your diagnoses also included:  Twin Gestation In Third Trimester, Abdominal Pain During Pregnancy, Third Trimester,  Labor In Third Trimester Providers Seen During Your Hospitalizations Provider Role Specialty Primary office phone Keith Hassan MD Attending Provider Obstetrics & Gynecology [phone] Bhupinder Leon DO Attending Provider Obstetrics & Gynecology [phone] Your Primary Care Physician (PCP) Primary Care Physician Office Phone Office Fax OTHER, PHYS ** None ** ** None ** Follow-up Information Follow up With Details Comments Contact Info Phys MD Arnaldo   Patient can only remember the practice name and not the physician 
  
 Kathrine Guzman MD Schedule an appointment as soon as possible for a visit in 2 weeks  [address] [place] [address] OB GYN Group [facility], [place] [address] [phone] Current Discharge Medication List  
  
START taking these medications Dose & Instructions Dispensing Information Comments Morning Noon Evening Bedtime  
 ibuprofen 800 mg tablet Commonly known as:  MOTRIN Your last dose was: Your next dose is:    
   
   
 Dose:  800 mg Take 1 Tab by mouth every eight (8) hours as needed. Quantity:  30 Tab Refills:  0  
     
   
   
   
  
 oxyCODONE-acetaminophen 7.5-325 mg per tablet Commonly known as:  PERCOCET 7.5 Your last dose was: Your next dose is:    
   
   
 Dose:  1-2 Tab Take 1-2 Tabs by mouth every six (6) hours as needed (Mild Pain). Max Daily Amount: 8 Tabs. Quantity:  30 Tab Refills:  0 CONTINUE these medications which have NOT CHANGED Dose & Instructions Dispensing Information Comments Morning Noon Evening Bedtime  
 ascorbic acid (vitamin C) 500 mg tablet Commonly known as:  VITAMIN C Your last dose was: Your next dose is: Take  by mouth. Refills:  0  
     
   
   
   
  
 aspirin 81 mg chewable tablet Your last dose was: Your next dose is:    
   
   
 Dose:  81 mg Take 81 mg by mouth daily. Refills:  0  
     
   
   
   
  
 CALCIUM PO Your last dose was: Your next dose is: Take  by mouth. Refills:  0  
     
   
   
   
  
 cholecalciferol 1,000 unit Cap Commonly known as:  VITAMIN D3 Your last dose was: Your next dose is: Take  by mouth daily. Refills:  0  
     
   
   
   
  
 folic acid 900 mcg tablet Your last dose was: Your next dose is:    
   
   
 Dose:  800 mcg Take 800 mcg by mouth daily. Refills:  0 IRON PO Your last dose was: Your next dose is: Take  by mouth. Refills:  0  
     
   
   
   
  
 levothyroxine 200 mcg tablet Commonly known as:  SYNTHROID Your last dose was: Your next dose is: Take  by mouth Daily (before breakfast). Refills:  0 PNV-DHA PO Your last dose was: Your next dose is: Take  by mouth. Refills:  0 valACYclovir 500 mg tablet Commonly known as:  VALTREX Your last dose was: Your next dose is: Take 1 tab BID x3d as needed. Quantity:  30 Tab Refills:  5 VITAMIN B COMPLEX NO.12-NIACIN PO Your last dose was: Your next dose is: Take  by mouth. Refills:  0 Where to Get Your Medications These medications were sent to [facility] [facility]  [address] [facility] [address] Phone:  [phone]  
  ibuprofen 800 mg tablet Information on where to get these meds will be given to you by the nurse or doctor. ! Ask your nurse or doctor about these medications  
  oxyCODONE-acetaminophen 7.5-325 mg per tablet Discharge Instructions Discharge instruction to follow: Activity: Pelvis rest for 6 weeks No heavy lifting over 15 lbs for 2 weeks No driving for 2 weeks No push/pull motion such as sweeping or vacuuming for 2 weeks No tub baths for 6 weeks  section keep incision clean and dry, may shower as normal with soap and water. Inspect incision every day for signs of infection listed below. Continue to use shyann-bottle with every void or bowel movement until comfortable stopping. Change sanitary pad after each urination or bowel movement. Call MD for the following: 
    Fever over 101 F; pain not relieved by medication; foul smelling vaginal discharge or increase in vaginal bleeding. Redness, swelling, or drainage from  incision. Take medication as prescribed. Follow up with MD as order.  Section: What to Expect at [facility] Your Recovery A  section, or , is surgery to deliver your baby through a cut, called an incision, that the doctor makes in your lower belly and uterus. You may have some pain in your lower belly and need pain medicine for 1 to 2 weeks. You can expect some vaginal bleeding for several weeks. You will probably need about 6 weeks to fully recover. It is important to take it easy while the incision is healing. Avoid heavy lifting, strenuous activities, or exercises that strain the belly muscles while you are recovering. Ask a family member or friend for help with housework, cooking, and shopping. This care sheet gives you a general idea about how long it will take for you to recover. But each person recovers at a different pace. Follow the steps below to get better as quickly as possible. How can you care for yourself at home? Activity · Rest when you feel tired. Getting enough sleep will help you recover. · Try to walk each day. Start by walking a little more than you did the day before. Bit by bit, increase the amount you walk. Walking boosts blood flow and helps prevent pneumonia, constipation, and blood clots. · Avoid strenuous activities, such as bicycle riding, jogging, weightlifting, and aerobic exercise, for 6 weeks or until your doctor says it is okay. · Until your doctor says it is okay, do not lift anything heavier than your baby. · Do not do sit-ups or other exercises that strain the belly muscles for 6 weeks or until your doctor says it is okay. · Hold a pillow over your incision when you cough or take deep breaths. This will support your belly and decrease your pain. · You may shower as usual. Pat the incision dry when you are done. · You will have some vaginal bleeding. Wear sanitary pads. Do not douche or use tampons until your doctor says it is okay. · Ask your doctor when you can drive again. · You will probably need to take at least 6 weeks off work. It depends on the type of work you do and how you feel. · Ask your doctor when it is okay for you to have sex. Diet · You can eat your normal diet.  If your stomach is upset, try bland, low-fat foods like plain rice, broiled chicken, toast, and yogurt. · Drink plenty of fluids (unless your doctor tells you not to). · You may notice that your bowel movements are not regular right after your surgery. This is common. Try to avoid constipation and straining with bowel movements. You may want to take a fiber supplement every day. If you have not had a bowel movement after a couple of days, ask your doctor about taking a mild laxative. · If you are breastfeeding, do not drink any alcohol. Medicines · Your doctor will tell you if and when you can restart your medicines. He or she will also give you instructions about taking any new medicines. · If you take blood thinners, such as warfarin (Coumadin), clopidogrel (Plavix), or aspirin, be sure to talk to your doctor. He or she will tell you if and when to start taking those medicines again. Make sure that you understand exactly what your doctor wants you to do. · Take pain medicines exactly as directed. ¨ If the doctor gave you a prescription medicine for pain, take it as prescribed. ¨ If you are not taking a prescription pain medicine, ask your doctor if you can take an over-the-counter medicine. · If you think your pain medicine is making you sick to your stomach: 
¨ Take your medicine after meals (unless your doctor has told you not to). ¨ Ask your doctor for a different pain medicine. · If your doctor prescribed antibiotics, take them as directed. Do not stop taking them just because you feel better. You need to take the full course of antibiotics. Incision care · If you have strips of tape on the incision, leave the tape on for a week or until it falls off. · Wash the area daily with warm, soapy water, and pat it dry. Don't use hydrogen peroxide or alcohol, which can slow healing. You may cover the area with a gauze bandage if it weeps or rubs against clothing. Change the bandage every day. · Keep the area clean and dry. Other instructions · If you breastfeed your baby, you may be more comfortable while you are healing if you place the baby so that he or she is not resting on your belly. Try tucking your baby under your arm, with his or her body along the side you will be feeding on. Support your baby's upper body with your arm. With that hand you can control your baby's head to bring his or her mouth to your breast. This is sometimes called the football hold. Follow-up care is a key part of your treatment and safety. Be sure to make and go to all appointments, and call your doctor if you are having problems. It's also a good idea to know your test results and keep a list of the medicines you take. When should you call for help? Call 911 anytime you think you may need emergency care. For example, call if: 
· You passed out (lost consciousness). · You have symptoms of a blood clot in your lung (called a pulmonary embolism). These may include: 
¨ Sudden chest pain. ¨ Trouble breathing. ¨ Coughing up blood. · You have thoughts of harming yourself, your baby, or another person. Call your doctor now or seek immediate medical care if: 
· You have severe vaginal bleeding. This means that you are soaking through a pad every hour for 2 or more hours. · You are dizzy or lightheaded, or you feel like you may faint. · You have new or more belly pain. · You have loose stitches, or your incision comes open. · You have symptoms of infection, such as: 
¨ Increased pain, swelling, warmth, or redness. ¨ Red streaks leading from the incision. ¨ Pus draining from the incision. ¨ A fever. · You have symptoms of a blood clot in your leg (called a deep vein thrombosis), such as: 
¨ Pain in your calf, back of the knee, thigh, or groin. ¨ Redness and swelling in your leg or groin. Watch closely for changes in your health, and be sure to contact your doctor if: 
· You feel sad, anxious, or hopeless for more than a few days. · You do not get better as expected. Where can you learn more? Go to http://sowmya-marisol.info/. Enter M806 in the search box to learn more about \" Section: What to Expect at Home. \" Current as of: 2017 Content Version: 11.3 © 9620-7043 [employer]. Care instructions adapted under license by [employer] (which disclaims liability or warranty for this information). If you have questions about a medical condition or this instruction, always ask your healthcare professional. [address] any warranty or liability for your use of this information. Discharge Orders None Vaddio Announcement We are excited to announce that we are making your provider's discharge notes available to you in Vaddio. You will see these notes when they are completed and signed by the physician that discharged you from your recent hospital stay. If you have any questions or concerns about any information you see in Vaddio, please call the Health Information Department where you were seen or reach out to your Primary Care Provider for more information about your plan of care. Introducing [facility]! [facility] introduces Vaddio patient portal. Now you can access parts of your medical record, email your doctor's office, and request medication refills online. 1. In your internet browser, go to [email or web address] 2. Click on the First Time User? Click Here link in the Sign In box. You will see the New Member Sign Up page. 3. Enter your Vaddio Access Code exactly as it appears below. You will not need to use this code after youve completed the sign-up process. If you do not sign up before the expiration date, you must request a new code. · Vaddio Access Code: [email or web address] Expires: 2018  4:11 PM 
 
4.  Enter the last four digits of your Social Security Number (xxxx) and Date of Birth (mm/dd/yyyy) as indicated and click Submit. You will be taken to the next sign-up page. 5. Create a [employer] ID. This will be your [employer] login ID and cannot be changed, so think of one that is secure and easy to remember. 6. Create a [employer] password. You can change your password at any time. 7. Enter your Password Reset Question and Answer. This can be used at a later time if you forget your password. 8. Enter your e-mail address. You will receive e-mail notification when new information is available in [address] 9. Click Sign Up. You can now view and download portions of your medical record. 10. Click the Download Summary menu link to download a portable copy of your medical information. If you have questions, please visit the Frequently Asked Questions section of the [employer] website. Remember, [employer] is NOT to be used for urgent needs. For medical emergencies, dial 911. Now available from your iPhone and Android! General Information Please provide this summary of care documentation to your next provider. Patient Signature:  ____________________________________________________________ Date:  ____________________________________________________________  
  
Florentin Dennis Provider Signature:  ____________________________________________________________ Date:  ____________________________________________________________

## 2017-10-16 NOTE — PROGRESS NOTES
Pt admitted to Room 424. Admission assessment completed. Discussed plan of care with patient. Discussed pt's choice of infant feeding method. Feeding options explored, including the importance of exclusive breastfeeding. Pt informed of our breastfeeding support system from from nursing staff and lactation staff during inpatient stay and following discharge. Questions and concerns addressed at this time. Pt confirms breastfeeding is her decision at this time.

## 2017-10-16 NOTE — ANESTHESIA PREPROCEDURE EVALUATION
Anesthetic History   No history of anesthetic complications            Review of Systems / Medical History  Patient summary reviewed and pertinent labs reviewed    Pulmonary  Within defined limits                 Neuro/Psych   Within defined limits           Cardiovascular  Within defined limits                Exercise tolerance: >4 METS     GI/Hepatic/Renal  Within defined limits              Endo/Other      Hypothyroidism: well controlled  Morbid obesity     Other Findings              Physical Exam    Airway  Mallampati: III  TM Distance: 4 - 6 cm  Neck ROM: normal range of motion   Mouth opening: Normal     Cardiovascular    Rhythm: regular  Rate: normal         Dental         Pulmonary  Breath sounds clear to auscultation               Abdominal         Other Findings            Anesthetic Plan    ASA: 3  Anesthesia type: spinal      Post-op pain plan if not by surgeon: intrathecal opiates      Anesthetic plan and risks discussed with: Patient and Spouse      2030 C/S time for NPO status.

## 2017-10-16 NOTE — H&P
History & Physical    Name: Rin Gallegos MRN: [id number]  SSN: [id number]    YOB: 1985  Age: 28 y.o. Sex: female        Subjective:     Estimated Date of Delivery: 17  OB History    Para Term  AB Living   5 2  2 2 2   SAB TAB Ectopic Molar Multiple Live Births   1 0    2      # Outcome Date GA Lbr Rito/2nd Weight Sex Delivery Anes PTL Lv   5 Current            4  13 34w6d  2.4 kg F VAGINAL DELI EPIDURAL AN Y CARLTON      Birth Comments: PPROM; 17P injections   3  09 35w1d  2.353 kg M Vag-Spont  Y CARLTON      Birth Comments: Dilated to 3 cm on admission   2 2008 4w0d          1 AB 2004              Obstetric Comments   Established patient presents to the office for NOB talk and ultrasound. Infertility patient was referred by our office to [facility], but patient states that insurance did not cover treatment there. She did not finish a complete cycle with them. She then trans   ferred to The [address] [place] [address] where she received ovulation medication (name unknown) and injections. Patient states she completed one round of treatment prior to becoming pregnant with Di/Di twins. Records have been requested. She has    a history of hypothyroidism and is currently followed by an Endocrinologist. Records release was obtained today to have recent lab work sent to our office. In  patient had a pre-term delivery at 35 weeks gestation. Patient states cause was unknown. I   n  she received 17 HP injections with her pregnancy. At 34w6d patient delivered due to PPROM. Referral to [facility] was made. Patient declines all genetic testing. She is to return in 3 weeks for NOB exam and FHTs by ultrasound. All questions answered and patient voiced full understanding. Ms. Veronica Massey is seen with pregnancy at 34w2d for active labor. Prenatal course complicated by h/o PTD x 2, twin gestation. .  the patients states that the baby moves as usual   Please see prenatal records for details. Past Medical History:   Diagnosis Date    Abnormal Pap smear 2011 & 2015    HPV    Abnormal Papanicolaou smear of cervix     2005     2004    Anemia     Asthma     childhood    Celiac disease     Depression     no meds    Endometriosis     Female dyspareunia     Herpes simplex without mention of complication     Type 2    History of hyperthyroidism 3/2014    treated w/radiation    Hypothyroidism     managed w/med    Infertility, female     Miscarriage     Pelvic pain in female 10/8/2015     labor in third trimester without delivery 10/16/2017     Past Surgical History:   Procedure Laterality Date    BREAST SURGERY PROCEDURE UNLISTED      breast augmentation     HX BREAST AUGMENTATION  2008    HX COLPOSCOPY      HX LEEP PROCEDURE      HX OTHER SURGICAL      Fulguration of Endometriosis    HX OTHER SURGICAL  2015    Bx to intestines (celiac dz)    HX PELVIC LAPAROSCOPY  , ,     HX WISDOM TEETH EXTRACTION  2007     Social History     Occupational History    Not on file.      Social History Main Topics    Smoking status: Never Smoker    Smokeless tobacco: Never Used    Alcohol use No    Drug use: No    Sexual activity: Yes     Partners: Male     Birth control/ protection: None     Family History   Problem Relation Age of Onset    Stroke Maternal Uncle     Heart Disease Maternal Uncle     Thyroid Disease Maternal Uncle     Stroke Maternal Grandfather     Cancer Maternal Grandfather      brain cancer    Cancer Paternal Grandmother      breast    Ovarian Cancer Paternal Grandmother     Cancer Paternal Aunt      breast    Other Mother      depression    Stroke Maternal Grandmother     Heart Disease Paternal Grandfather     Thyroid Disease Other      hyperthyroidism    Thyroid Disease Other      hyperthyroidism    Heart defect Sister      Hole in Heart (closed)    Other Father      Cleft Palate       No Known Allergies  Prior to Admission medications    Medication Sig Start Date End Date Taking? Authorizing Provider   aspirin 81 mg chewable tablet Take 81 mg by mouth daily. Historical Provider   ascorbic acid, vitamin C, (VITAMIN C) 500 mg tablet Take  by mouth. Historical Provider   folic acid 026 mcg tablet Take 800 mcg by mouth daily. Historical Provider   cholecalciferol (VITAMIN D3) 1,000 unit cap Take  by mouth daily. Historical Provider   levothyroxine (SYNTHROID) 200 mcg tablet Take  by mouth Daily (before breakfast). Historical Provider   PNV COMBO#47/IRON/FA #1/DHA (PNV-DHA PO) Take  by mouth. Historical Provider   VITAMIN B COMPLEX NO.12-NIACIN PO Take  by mouth. Historical Provider   CALCIUM PO Take  by mouth. Historical Provider   FERROUS SULFATE (IRON PO) Take  by mouth. Historical Provider   valACYclovir (VALTREX) 500 mg tablet Take 1 tab BID x3d as needed. 3/22/17   Baltazar Foley, NP        Review of Systems:  Constitutional:No headache, fever  Cardiac:   No chest pain      Resp: No cough or shortness of breath     GI:   No nausea/vomiting, diarrhea, abdominal pain    :   No dysuria  Neuro:     No vision changes, headache      Objective:     Vitals:  Vitals:    10/16/17 1746   BP: 114/75   Pulse: (!) 126   Resp: 18   Temp: 98.8 °F (37.1 °C)        Physical Exam:  Patient without distress.   Heart: Regular rate and rhythm  Lung: clear to auscultation throughout lung fields, no wheezes, no rales, no rhonchi and normal respiratory effort  Back: costovertebral angle tenderness absent  Abdomen: soft, nontender  Fundus: soft and non tender  Cervical Exam: 4-5/70/-2 vtx  Lower Extremities:  - Edema 3+  Membranes:  Intact  Fetal Heart Rate: Baseline: 140's x 2 per minute  Variability: moderate  Accelerations: yes  Decelerations: none  Uterine contractions: irregular, every 3-7 minutes    Prenatal Labs:   Lab Results   Component Value Date/Time    Rubella, External immune 2017    GrBStrep, External negative 2013    HBsAg, External neg 2017    HIV, External NR 2017    RPR, External nr 2017         Assessment/Plan:     Ms. Robert Loya is a  seen with pregnancy at 34w2d for  labor twin gestation, vtx/breech.       Lab Results   Component Value Date/Time    GrBStrep, External negative 2013       Plan:     Admit for primary c/s for malpresentation twin B    Patient discussed with Dr. Larrie Harada

## 2017-10-17 LAB
BASOPHILS # BLD: 0 K/UL (ref 0–0.2)
BASOPHILS NFR BLD: 0 % (ref 0–2)
DIFFERENTIAL METHOD BLD: ABNORMAL
EOSINOPHIL # BLD: 0.1 K/UL (ref 0–0.8)
EOSINOPHIL NFR BLD: 1 % (ref 0.5–7.8)
ERYTHROCYTE [DISTWIDTH] IN BLOOD BY AUTOMATED COUNT: 13.4 % (ref 11.9–14.6)
HCT VFR BLD AUTO: 38.1 % (ref 35.8–46.3)
HGB BLD-MCNC: 12.9 G/DL (ref 11.7–15.4)
IMM GRANULOCYTES # BLD: 0.1 K/UL (ref 0–0.5)
IMM GRANULOCYTES NFR BLD: 0.4 % (ref 0–5)
LYMPHOCYTES # BLD: 1.1 K/UL (ref 0.5–4.6)
LYMPHOCYTES NFR BLD: 7 % (ref 13–44)
MCH RBC QN AUTO: 32.7 PG (ref 26.1–32.9)
MCHC RBC AUTO-ENTMCNC: 33.9 G/DL (ref 31.4–35)
MCV RBC AUTO: 96.7 FL (ref 79.6–97.8)
MONOCYTES # BLD: 0.9 K/UL (ref 0.1–1.3)
MONOCYTES NFR BLD: 6 % (ref 4–12)
NEUTS SEG # BLD: 13.7 K/UL (ref 1.7–8.2)
NEUTS SEG NFR BLD: 86 % (ref 43–78)
PLATELET # BLD AUTO: 159 K/UL (ref 150–450)
PMV BLD AUTO: 11.5 FL (ref 10.8–14.1)
RBC # BLD AUTO: 3.94 M/UL (ref 4.05–5.25)
WBC # BLD AUTO: 15.9 K/UL (ref 4.3–11.1)

## 2017-10-17 PROCEDURE — 65270000029 HC RM PRIVATE

## 2017-10-17 PROCEDURE — 74011250637 HC RX REV CODE- 250/637: Performed by: OBSTETRICS & GYNECOLOGY

## 2017-10-17 PROCEDURE — 74011250636 HC RX REV CODE- 250/636: Performed by: ANESTHESIOLOGY

## 2017-10-17 PROCEDURE — 36415 COLL VENOUS BLD VENIPUNCTURE: CPT | Performed by: OBSTETRICS & GYNECOLOGY

## 2017-10-17 PROCEDURE — 75410000003 HC RECOV DEL/VAG/CSECN EA 0.5 HR

## 2017-10-17 PROCEDURE — 74011000250 HC RX REV CODE- 250: Performed by: OBSTETRICS & GYNECOLOGY

## 2017-10-17 PROCEDURE — 85025 COMPLETE CBC W/AUTO DIFF WBC: CPT | Performed by: OBSTETRICS & GYNECOLOGY

## 2017-10-17 RX ORDER — OXYCODONE AND ACETAMINOPHEN 7.5; 325 MG/1; MG/1
2 TABLET ORAL
Status: DISCONTINUED | OUTPATIENT
Start: 2017-10-17 | End: 2017-10-20 | Stop reason: HOSPADM

## 2017-10-17 RX ORDER — OXYCODONE AND ACETAMINOPHEN 7.5; 325 MG/1; MG/1
1-2 TABLET ORAL
Status: DISCONTINUED | OUTPATIENT
Start: 2017-10-17 | End: 2017-10-17 | Stop reason: SDUPTHER

## 2017-10-17 RX ORDER — IBUPROFEN 800 MG/1
800 TABLET ORAL
Status: DISCONTINUED | OUTPATIENT
Start: 2017-10-17 | End: 2017-10-20 | Stop reason: HOSPADM

## 2017-10-17 RX ORDER — DOCUSATE SODIUM 100 MG/1
100 CAPSULE, LIQUID FILLED ORAL 2 TIMES DAILY
Status: DISCONTINUED | OUTPATIENT
Start: 2017-10-17 | End: 2017-10-20 | Stop reason: HOSPADM

## 2017-10-17 RX ORDER — SODIUM CHLORIDE, SODIUM LACTATE, POTASSIUM CHLORIDE, CALCIUM CHLORIDE 600; 310; 30; 20 MG/100ML; MG/100ML; MG/100ML; MG/100ML
125 INJECTION, SOLUTION INTRAVENOUS CONTINUOUS
Status: DISCONTINUED | OUTPATIENT
Start: 2017-10-17 | End: 2017-10-20 | Stop reason: HOSPADM

## 2017-10-17 RX ORDER — SIMETHICONE 80 MG
80 TABLET,CHEWABLE ORAL
Status: DISCONTINUED | OUTPATIENT
Start: 2017-10-17 | End: 2017-10-20 | Stop reason: HOSPADM

## 2017-10-17 RX ORDER — LEVOTHYROXINE SODIUM 100 UG/1
200 TABLET ORAL
Status: DISCONTINUED | OUTPATIENT
Start: 2017-10-18 | End: 2017-10-20 | Stop reason: HOSPADM

## 2017-10-17 RX ORDER — OXYCODONE AND ACETAMINOPHEN 7.5; 325 MG/1; MG/1
1 TABLET ORAL
Status: DISCONTINUED | OUTPATIENT
Start: 2017-10-17 | End: 2017-10-20 | Stop reason: HOSPADM

## 2017-10-17 RX ADMIN — Medication 1 AMPULE: at 21:42

## 2017-10-17 RX ADMIN — KETOROLAC TROMETHAMINE 30 MG: 30 INJECTION, SOLUTION INTRAMUSCULAR at 09:06

## 2017-10-17 RX ADMIN — KETOROLAC TROMETHAMINE 30 MG: 30 INJECTION, SOLUTION INTRAMUSCULAR at 15:04

## 2017-10-17 RX ADMIN — Medication 1 AMPULE: at 13:29

## 2017-10-17 RX ADMIN — KETOROLAC TROMETHAMINE 30 MG: 30 INJECTION, SOLUTION INTRAMUSCULAR at 03:13

## 2017-10-17 RX ADMIN — NALBUPHINE HYDROCHLORIDE 5 MG: 10 INJECTION, SOLUTION INTRAMUSCULAR; INTRAVENOUS; SUBCUTANEOUS at 03:23

## 2017-10-17 RX ADMIN — LEVOTHYROXINE SODIUM 200 MCG: 100 TABLET ORAL at 07:30

## 2017-10-17 RX ADMIN — KETOROLAC TROMETHAMINE 30 MG: 30 INJECTION, SOLUTION INTRAMUSCULAR at 20:52

## 2017-10-17 NOTE — ROUTINE PROCESS
SBAR IN Report: Mother    Verbal report received from [employer] (full name & credentials) on this patient, who is now being transferred from L&D (unit) for routine progression of care. The patient is wearing a green \"Anesthesia-Duramorph\" band. Report consisted of patient's Situation, Background, Assessment and Recommendations (SBAR). Soddy Daisy ID bands were compared with the identification form, and verified with the patient and transferring nurse. Information from the SBAR and Intake/Output and the Colt Report was reviewed with the transferring nurse; opportunity for questions and clarification provided.

## 2017-10-17 NOTE — ANESTHESIA POSTPROCEDURE EVALUATION
Post-Anesthesia Evaluation and Assessment    Patient: Sidney Al MRN: [id number]  SSN: [id number]    YOB: 1985  Age: 28 y.o. Sex: female       Cardiovascular Function/Vital Signs  Visit Vitals    /60 (BP 1 Location: Left arm, BP Patient Position: Sitting)    Pulse 74    Temp 36.1 °C (97 °F)    Resp 18    SpO2 99%    Breastfeeding No       Patient is status post spinal anesthesia for Procedure(s):   SECTION MULTIPLE. Nausea/Vomiting: None    Postoperative hydration reviewed and adequate. Pain:  Pain Scale 1: Numeric (0 - 10) (10/17/17 0003)  Pain Intensity 1: 0 (10/17/17 0003)   Managed    Neurological Status:   Neuro (WDL): Within Defined Limits (10/16/17 2142)   At baseline    Mental Status and Level of Consciousness: Arousable    Pulmonary Status:   O2 Device: Room air (10/16/17 2300)   Adequate oxygenation and airway patent    Complications related to anesthesia: None    Post-anesthesia assessment completed.  No concerns    Signed By: Elke Bravo MD     2017

## 2017-10-17 NOTE — PROGRESS NOTES
Problem: Nutrition Deficit  Goal: *Optimize nutritional status  Nutrition:   Reason for assessment: Referral received from nursing admission Malnutrition Screening Tool r/t multigestation-twins. Assessment:   Diet order(s): Regular  Food/Nutrition Patient History:  Pt s/p day 1 post partum-twins. Pt plans on breast feeding; endorses good po intake at breakfast and lunch today. Pt ate eggs with cheese, bagel with cream cheese, fruit and and fruit juice at breakfast and ordered 2 grilled cheese sandwiches, salad, pudding and milk for lunch today. Pt states she is going \"easy\" on food today so she doesn't have any stomach issues. Anthropometrics:    Height: 5'5\",  Weight: 108.9 kg (240#) (Unknown source), BMI 39.9  Macronutrient needs:  EER:  ~ 3178 kcal /day (20 kcal/kg pre delivery BW + additional 1000 kcal/day r/t lactation) adjust if post partum weight available  EPR:  ~95 grams protein/day (0.8 grams/kg pre pregnant IBW + additional 50 gm/day r/t lactation)  Intake/Comparative Standards:  No recorded intake, based on patient's verbalized intake, pt potentially meets ~ 75% estimated kcal needs and 100% estimated protein needs. Nutrition Diagnosis:  Increased energy and protein needs r/t lactation as evidenced by pt s/p day 1 post partum with plans for breast feeding twins. Intervention:  1. Meals and snacks: Continue current diet. Encouraged patient to eat between meal snacks and to receive adequate fluid intake-4 to 5 qt/day to compensate for the volume of milk produced. Pt verbalized understanding. 2.  Discharge nutrition plan: Continue Regular diet.      Josefina Isidro, [address], [address] [place] [address], MPH  [phone]

## 2017-10-17 NOTE — PROGRESS NOTES
Anesthesiology  Post-op Note    Post-op day 1 s/p  via spinal with neuraxial opioids for post-op pain management. Visit Vitals    /60 (BP 1 Location: Left arm, BP Patient Position: At rest)    Pulse 79    Temp 36.6 °C (97.8 °F)    Resp 18    LMP 2017    SpO2 99%    Breastfeeding No     Airway patent, patient appropriately hydrated and appears euvolemic. Patient is Alert and oriented. Pain is well controlled. Pruritus is well controlled. Nausea is well controlled. No complaints about back or site of injection. Motor and sensory function has returned to baseline in lower extremities. Patient is satisfied with anesthetic and reports no complications. Continue current orders, then initiate surgeon's orders for pain management 24 hours after . Follow up per surgeon.

## 2017-10-17 NOTE — PROGRESS NOTES
Post-Partum Day Number 1 Progress Note    Patient doing well  without significant complaints. Pain controlled on current medication. Gottlieb just removed. normal lochia. Vitals:    Visit Vitals    /54 (BP 1 Location: Right arm, BP Patient Position: At rest)    Pulse 83    Temp 98 °F (36.7 °C)    Resp 16    LMP 02/18/2017    SpO2 99%    Breastfeeding No       Vital signs stable, afebrile. Exam:  Patient without distress. Heart rrr  Lungs cta b&s               Abdomen soft, fundus firm at level of umbilicus, nontender. Incision clean, dry and intact. Lower extremities are negative for swelling, cords or tenderness. Lab Results   Component Value Date/Time    ABO Group O 04/25/2017 09:35 AM    Rh (D) Positive 04/25/2017 09:35 AM    ABO/Rh(D) Mort Senters POSITIVE 10/16/2017 06:44 PM        Lab Results   Component Value Date/Time    ABO/Rh(D) Mort Senters POSITIVE 10/16/2017 06:44 PM    Antibody screen NEG 10/16/2017 06:44 PM    Antibody screen, External negative 04/25/2017    Rubella, External immune 04/25/2017    GrBStrep, External negative 01/20/2013    ABO,Rh O Positive 04/25/2017     Lab Results   Component Value Date/Time    HGB 12.9 10/17/2017 06:42 AM    Hgb, External 14.4 04/25/2017         Assessment and Plan:  Patient appears to be having uncomplicated post-partum course. Continue routine post-delivery care and maternal education. Breastfeeding. ptl with babies in special care nursery. Routine care.

## 2017-10-17 NOTE — PROGRESS NOTES
Fetal heart rate pre spinal baby A (130'sbpm) baby B (140'sbpm)   Fetal heart rate post spinal baby A (130'sbpm) baby B (140'sbpm)

## 2017-10-17 NOTE — ANESTHESIA PROCEDURE NOTES
Spinal Block    Start time: 10/16/2017 8:29 PM  End time: 10/16/2017 8:32 PM  Performed by: Kat Mane  Authorized by: Kat Mane     Pre-procedure:   Indications: at surgeon's request and primary anesthetic  Preanesthetic Checklist: patient identified, risks and benefits discussed, anesthesia consent, site marked, patient being monitored and timeout performed    Timeout Time: 20:27          Spinal Block:   Patient Position:  Seated  Prep Region:  Lumbar  Prep: chlorhexidine      Location:  L3-4  Technique:  Single shot  Local:  Lidocaine 1%      Needle:   Needle Type:  Pencan  Needle Gauge:  25 G  Attempts:  1      Events: CSF confirmed, no blood with aspiration and no paresthesia        Assessment:  Insertion:  Uncomplicated  Patient tolerance:  Patient tolerated the procedure well with no immediate complications

## 2017-10-17 NOTE — PROGRESS NOTES
Bedside report received from John Armendariz RN. Care assumed. Pt pumping at this time. Denies needs at this time. Encouraged to call for needs or concerns. Verbalized understanding.

## 2017-10-17 NOTE — PROGRESS NOTES
Alt at bedside. Dressing removed. Incision intact with SQS. No bleeding noted. Toradol IV given for pain 1/10. See MAR.

## 2017-10-17 NOTE — L&D DELIVERY NOTE
Delivery Summary    Patient: Ever Xiong MRN: [id number]  SSN: [id number]    YOB: 1985  Age: 28 y.o. Sex: female         Section Delivery Operative Report    Date of Surgery: 10/16/2017      Preoperative Diagnosis: IUP 34 weeks in active labor     Postoperative Diagnosis: 34 week IUP viable twins - baby a 5#7oz with apgars [email or web address] and 9@5. Baby b 5#3oz with apgars [email or web address] and 9@5    Procedure: Procedure(s):   SECTION MULTIPLE    Surgeon(s) and Role:     * Lola Pal MD - Co-Surgeon     * Oxana Caruso DO - Primary     Anesthesia: Spinal    Findings: Grossly normal uterus. Intravenous Fluids: 1200cc     Urine Output: 400cc clear yellow urine     Estimated Blood Loss:    561EF     Complications: none     Specimens:   ID Type Source Tests Collected by Time Destination   1 : placenta Placenta   Oxana Caruso DO 10/16/2017 2030 Discarded         Procedure Detail:      The patient was taken to the operating room, where anesthesia was found to be adequate. The patient was prepped and draped in the normal sterile fashion. Pfannenstiel skin incision was made with the scalpel and carried down to the underlying fascia. The fascial incision was extended laterally with Jackson scissors. This fascial incision was grasped with Kocher clamps, tented up, and the underlying rectus muscles were dissected bluntly. The inferior edge of the rectus fascia was grasped with Kocher clamps, tented up, and the underlying rectus muscle was dissected off sharply. The rectus muscle was divided in the midline bluntly. The peritoneum was entered bluntly and extended inferiorly with Metzenbaum scissors. The bladder blade was then inserted. The vesicouterine peritoneum was identified, grasped with pick-ups and entered sharply with Metzenbaum scissors. The bladder flap was then created digitally and the bladder blade was reinserted.      A low transverse uterine incision was made with the scalpel and extended superiorly inferiorly with blunt finger dissection. Arom was performed. The babys head was then delivered atraumatically. The nose and mouth were suctioned. Shoulders and body were delivered with gentle fundal pressure. Delayed cord clamping was practices. The cord was clamped and cut and the baby was handed off to the waiting  care unit staff. Next foot /hands were felt on baby b. Baby was verted to cephalic and held in place with fundal pressure. arom performed and baby delivered with gentle fundal pressure. Delayed cord clamping practiced. Next cord clamped and cut and handed to awaiting nicu staff. The uterus was left inside the abdominal cavity. The uterine incision was closed in two layers. The first layer with running locked layer of 0 Chromic. The second layer was imbricating layer of 0 Chromic with good hemostasis assured. The paracolic gutters were washed with warm normal saline. Hysterotomy was inspected and some oozing was seen along left edge of hysterotomy with figure of 8 suture. Figure of 8 also placed along superior edge on the right. Good hemostasis was again reassured throughout. The peritoneum was closed with 2-0 Vicryl in a running fashion and the rectus muscles reapproximated in the midline using 0-chromic. The fascia was closed with 0 Vicryl  in a running fashion. Good hemostasis was assured. The subcuticular layers were reapproximated with 2-0 plain gut in interrupted fashion. The skin was closed with a 4-0 monocryl in a subcuticular fashion. The patient tolerated the procedure well. Sponge, lap, and needle counts were correct times two and the patient was taken to recovery in stable condition.     Signed By: Dwight Moulton DO     2017               Labor Events:    Labor:    Theora Alistair [[id number]]   Yes     Theora Alistair [[id number]]   Yes     Rupture Date:    Theora Alistair [[id number]]         KORIN Mars Herrera evelyn [[id number]]         Rupture Time:    Darcus Booze [[id number]]         Darcus Booze [[id number]]         Rupture Type:    Darcus Booze [[id number]]   AROM     Darcus Booze [[id number]]   AROM     Amniotic Fluid Volume:      Amniotic Fluid Description:  Amniotic fluid Odor:    Darcus Booze [[id number]]   Clear     Darcus Booze [[id number]]   Clear        [place], A Fernando Lauth [[id number]]         Darcus Booze [[id number]]          Induction:    Darcus Booze [[id number]]   None     Darcus Booze [[id number]]   None         Induction Date:       Darcus Booze [[id number]]         Darcus Booze [[id number]]          Induction Time:    Darcus Booze [[id number]]         Darcus Booze [[id number]]          Indications for Induction:    Darcus Booze [[id number]]         Darcus Booze [[id number]]          Augmentation:    Darcus Booze [[id number]]   None     Darcus Booze [[id number]]   None     Augmentation Date:    Darcus Booze [[id number]]         Darcus Booze [[id number]]         Augmentation Time:    Darcus Booze [[id number]]         Darcus Booze [[id number]]         Indications for Augmentation:    Darcus Booze [[id number]]         Darcus Booze [[id number]]         Events:       Darcus Booze [[id number]]         Darcus Booze [[id number]]         Cervical Ripening:    Darcus Booze [[id number]]         Darcus Booze [[id number]]            Darcus Booze [[id number]]         Darcus Booze [[id number]]            Darcus Booze [[id number]]   None     Darcus Booze [[id number]]   None     Rupture Identifier:    Darcus Booze [[id number]]   Rupture 1     Darcus Booze [[id number]]   Rupture 1      Labor complications:    [place], A BOY Drake Deleon [[id number]]   None     Banner Adair [[id number]]   None      Additional complications:    Banner Adair [[id number]]         [facility] [[id number]]            Delivery Events:  Estimated Blood Loss (ml):    Jinny Borrero [[id number]]         [facility] [[id number]]           Information for the patient's :  Jinny Borrero [[id number]]     Delivery Summary - Baby    Delivery Date: 10/16/2017  Delivery Time: 8:48 PM  Delivery Type: , Low Transverse    Section Delivery:     Sex:  male     Gestational Age: 34w2d  Delivery Clinician:  Agata AGEE   Living?: Living  Delivery Location: OR           APGARS  One minute Five minutes Ten minutes   Skin color: 0   1        Heart rate: 2   2        Grimace: 2   2        Muscle tone: 2   2        Breathin   2        Totals: 8   9           Presentation: Vertex    Position:   Occiput Anterior  Resuscitation Method:  Suctioning-bulb; Tactile Stimulation     Meconium Stained:        Cord Information: 3 Vessels   Complications: None  Cord Blood Sent?:  Yes    Blood Gases Sent?:  Yes    Placenta:  Date/Time: 10/16  8:50 PM  Removal: Manual Removal      Appearance: Normal      Measurements:  Birth Weight: 5 lb 7.5 oz (2.48 kg)      Birth Length:        Head Circumference:        Chest Circumference:       Abdominal Girth:       Other Providers:   TAL AGEE;KYLE SOLER;LIDYA LEON;GUZMAN NORTON;DANIEL GRESHAM;SYMONE FERRELL;GUZMAN EARL;TANVIR LUIS;KEITH PUENTES Obstetrician;Primary Nurse;Primary  Nurse;Nicu Nurse;Neonatologist;Anesthesiologist;Crna;Staff Nurse;Respiratory Therapist       Information for the patient's :  Jinny Borrero [[id number]]     Delivery Summary - Baby    Delivery Date: 10/16/2017  Delivery Time: 8:51 PM  Delivery Type: , Low Transverse    Section Delivery:     Sex:  male Gestational Age: 35w2d  Delivery Clinician:  TAL AGEE   Living?: Living  Delivery Location: OR           APGARS  One minute Five minutes Ten minutes   Skin color: 1   1        Heart rate: 2   2        Grimace: 1   2        Muscle tone: 1   2        Breathin   2        Totals: 7   9           Presentation: Breech    Position:        Resuscitation Method:  Suctioning-bulb; Tactile Stimulation     Meconium Stained:        Cord Information: 3 Vessels   Complications: None  Cord Blood Sent?:  Yes    Blood Gases Sent?:  Yes    Placenta:  Date/Time: 10/16  8:52 PM  Removal: Manual Removal      Appearance: Normal     [place] Measurements:  Birth Weight: 5 lb 2.5 oz (2.34 kg)      Birth Length: 1' 7.09\" (0.485 m)      Head Circumference: 1' 0.6\" (0.32 m)      Chest Circumference: 11.61\" (0.295 m)     Abdominal Girth:       Other Providers:   TAL AGEE;KYLE SOLER;GUZMAN NORTON;DANIEL GRESHAM;SYMONE FERRELL;GUZMAN EARL;KEITH PUENTES;LIDYA LEON;TANVIR LUIS Obstetrician;Primary Nurse;Nicu Nurse;Neonatologist;Anesthesiologist;Crna;Respiratory Therapist;Nursery Nurse;Staff Nurse           Group Beta Strep:   Lab Results   Component Value Date/Time    GrBStrep, External negative 2013        Cord Blood Results:  Information for the patient's :  Terence Watson [[id number]]   No results found for: ABORH, PCTABR, PCTDIG, BILI, ABORHEXT, ABORH  Information for the patient's :  Terence Watson [[id number]]   No results found for: ABORH, PCTABR, PCTDIG, BILI, ABORHEXT, ABORH    Information for the patient's :  Treence Watson [[id number]]   No results found for: APH, APCO2, APO2, AHCO3, ABEC, ABDC, O2ST, SITE, [place], PHI, [place], PO2I, HCO3I, SO2I, IBD  Information for the patient's :  Terence Watson [[id number]]   No results found for: APH, APCO2, APO2, AHCO3, ABEC, ABDC, O2ST, SITE, RSCOM, PHI, PCO2I, PO2I, HCO3I, SO2I, IBD    Information for the patient's :  Chantal Heathald [[id number]]   No results found for: EPHV, PCO2V, PO2V, HCO3V, O2STV, EBDV  Information for the patient's :  Chantal Rice [[id number]]   No results found for: EPHV, PCO2V, PO2V, HCO3V, O2STV, EBDV

## 2017-10-18 PROCEDURE — 74011250637 HC RX REV CODE- 250/637: Performed by: OBSTETRICS & GYNECOLOGY

## 2017-10-18 PROCEDURE — 74011000250 HC RX REV CODE- 250: Performed by: OBSTETRICS & GYNECOLOGY

## 2017-10-18 PROCEDURE — 65270000029 HC RM PRIVATE

## 2017-10-18 RX ADMIN — DOCUSATE SODIUM 100 MG: 100 CAPSULE, LIQUID FILLED ORAL at 17:09

## 2017-10-18 RX ADMIN — IBUPROFEN 800 MG: 800 TABLET ORAL at 11:00

## 2017-10-18 RX ADMIN — IBUPROFEN 800 MG: 800 TABLET ORAL at 17:08

## 2017-10-18 RX ADMIN — OXYCODONE HYDROCHLORIDE AND ACETAMINOPHEN 1 TABLET: 7.5; 325 TABLET ORAL at 13:33

## 2017-10-18 RX ADMIN — IBUPROFEN 800 MG: 800 TABLET ORAL at 04:02

## 2017-10-18 RX ADMIN — Medication 1 AMPULE: at 08:24

## 2017-10-18 RX ADMIN — SIMETHICONE CHEW TAB 80 MG 80 MG: 80 TABLET ORAL at 22:52

## 2017-10-18 RX ADMIN — LEVOTHYROXINE SODIUM 200 MCG: 100 TABLET ORAL at 07:30

## 2017-10-18 RX ADMIN — SIMETHICONE CHEW TAB 80 MG 80 MG: 80 TABLET ORAL at 11:00

## 2017-10-18 RX ADMIN — OXYCODONE HYDROCHLORIDE AND ACETAMINOPHEN 1 TABLET: 7.5; 325 TABLET ORAL at 22:52

## 2017-10-18 RX ADMIN — OXYCODONE HYDROCHLORIDE AND ACETAMINOPHEN 1 TABLET: 7.5; 325 TABLET ORAL at 17:09

## 2017-10-18 RX ADMIN — Medication 1 AMPULE: at 17:10

## 2017-10-18 RX ADMIN — SIMETHICONE CHEW TAB 80 MG 80 MG: 80 TABLET ORAL at 17:09

## 2017-10-18 RX ADMIN — IBUPROFEN 800 MG: 800 TABLET ORAL at 22:52

## 2017-10-18 RX ADMIN — DOCUSATE SODIUM 100 MG: 100 CAPSULE, LIQUID FILLED ORAL at 08:24

## 2017-10-18 RX ADMIN — SIMETHICONE CHEW TAB 80 MG 80 MG: 80 TABLET ORAL at 08:24

## 2017-10-18 NOTE — PROGRESS NOTES
10/18/17 1333   Pain Assessment   Pain Scale 1 Numeric (0 - 10)   Pain Intensity 1 3   Pain Location 1 Incisional   Pain Orientation 1 Lower   Pain Description 1 Sore   Pain Intervention(s) 1 Medication (see MAR)

## 2017-10-18 NOTE — PROGRESS NOTES
Percocet and Motrin given po for pain 3/10 per patient request.  Scheduled Colace and Chewable Mylicon given po.

## 2017-10-18 NOTE — PROGRESS NOTES
Post-Operative Day Number 2 Progress Note    Patient doing well post-op day 2 from  delivery without significant complaints. Pain controlled on current medication. Voiding without difficulty, normal lochia. Vitals:  Patient Vitals for the past 8 hrs:   BP Temp Pulse Resp   10/18/17 0743 106/68 98.1 °F (36.7 °C) 79 18     Temp (24hrs), Av °F (36.7 °C), Min:97.5 °F (36.4 °C), Max:98.6 °F (37 °C)      Vital signs stable, afebrile. Exam:  Patient without distress. Abdomen soft, fundus firm at level of umbilicus, non tender. Incision dry and clean without erythema. Lower extremities are negative for swelling, cords or tenderness. Lab/Data Review: All lab results for the last 24 hours reviewed. Assessment and Plan:  Patient appears to be having uncomplicated post- course. Continue routine post-op care and maternal education.   Babies in NICU doing well

## 2017-10-18 NOTE — PROGRESS NOTES
Shift assessment complete. Toradol administered, see MAR. Infants remain in SCN. Pt resting quietly. Questions encouraged and answered. Pt denies further needs at this time. Encouraged to call for needs or concerns.  Verbalized understanding

## 2017-10-18 NOTE — PROGRESS NOTES
Report received from June Guevara RN, and care assumed. Bedside report completed. Pt denies any needs at present.

## 2017-10-18 NOTE — PROGRESS NOTES
Bedside report received from Praveen Nolan RN. Care assumed. Pt in SCN visiting infants at this time.

## 2017-10-19 VITALS
OXYGEN SATURATION: 99 % | HEART RATE: 78 BPM | SYSTOLIC BLOOD PRESSURE: 117 MMHG | RESPIRATION RATE: 18 BRPM | TEMPERATURE: 98.4 F | DIASTOLIC BLOOD PRESSURE: 68 MMHG

## 2017-10-19 PROCEDURE — 74011000250 HC RX REV CODE- 250: Performed by: OBSTETRICS & GYNECOLOGY

## 2017-10-19 PROCEDURE — 74011250637 HC RX REV CODE- 250/637: Performed by: OBSTETRICS & GYNECOLOGY

## 2017-10-19 RX ORDER — IBUPROFEN 800 MG/1
800 TABLET ORAL
Qty: 30 TAB | Refills: 0 | Status: SHIPPED | OUTPATIENT
Start: 2017-10-19 | End: 2017-11-30 | Stop reason: ALTCHOICE

## 2017-10-19 RX ORDER — OXYCODONE AND ACETAMINOPHEN 7.5; 325 MG/1; MG/1
1-2 TABLET ORAL
Qty: 30 TAB | Refills: 0 | Status: SHIPPED | OUTPATIENT
Start: 2017-10-19 | End: 2017-11-30 | Stop reason: ALTCHOICE

## 2017-10-19 RX ADMIN — IBUPROFEN 800 MG: 800 TABLET ORAL at 12:33

## 2017-10-19 RX ADMIN — OXYCODONE HYDROCHLORIDE AND ACETAMINOPHEN 1 TABLET: 7.5; 325 TABLET ORAL at 12:32

## 2017-10-19 RX ADMIN — OXYCODONE HYDROCHLORIDE AND ACETAMINOPHEN 1 TABLET: 7.5; 325 TABLET ORAL at 19:10

## 2017-10-19 RX ADMIN — Medication 1 AMPULE: at 19:12

## 2017-10-19 RX ADMIN — DOCUSATE SODIUM 100 MG: 100 CAPSULE, LIQUID FILLED ORAL at 12:33

## 2017-10-19 RX ADMIN — DOCUSATE SODIUM 100 MG: 100 CAPSULE, LIQUID FILLED ORAL at 19:10

## 2017-10-19 RX ADMIN — SIMETHICONE CHEW TAB 80 MG 80 MG: 80 TABLET ORAL at 19:10

## 2017-10-19 RX ADMIN — Medication 1 AMPULE: at 12:33

## 2017-10-19 RX ADMIN — IBUPROFEN 800 MG: 800 TABLET ORAL at 19:10

## 2017-10-19 RX ADMIN — SIMETHICONE CHEW TAB 80 MG 80 MG: 80 TABLET ORAL at 12:33

## 2017-10-19 RX ADMIN — IBUPROFEN 800 MG: 800 TABLET ORAL at 05:15

## 2017-10-19 RX ADMIN — OXYCODONE HYDROCHLORIDE AND ACETAMINOPHEN 1 TABLET: 7.5; 325 TABLET ORAL at 05:15

## 2017-10-19 NOTE — LACTATION NOTE
Mom is pumping for twins in [facility]. Reviewed protocol for engorgement. Written info reviewed. Reviewed reverse pressure softening. Offered ice packs, mom declined for now but will ask for them when desired. Encouraged to pump as able every 3 hours. Use ice 20 min after pumping. Use massage with pumping. Continue with ibuprophen as ordered. Discussed only use heat prior to pumping if milk is flowing from breast.  Reviewed supply and demand. Mom's plan is to pump and bottle only. Encouraged to pump at bedside and skin to skin.

## 2017-10-19 NOTE — DISCHARGE INSTRUCTIONS
Discharge instruction to follow: Activity: Pelvis rest for 6 weeks     No heavy lifting over 15 lbs for 2 weeks     No driving for 2 weeks     No push/pull motion such as sweeping or vacuuming for 2 weeks     No tub baths for 6 weeks     section keep incision clean and dry, may shower as normal with soap and water. Inspect incision every day for signs of infection listed below. Continue to use shyann-bottle with every void or bowel movement until comfortable stopping. Change sanitary pad after each urination or bowel movement. Call MD for the following:      Fever over 101 F; pain not relieved by medication; foul smelling vaginal discharge or increase in vaginal bleeding. Redness, swelling, or drainage from  incision. Take medication as prescribed. Follow up with MD as order.  Section: What to Expect at [address] [place] [address]    A  section, or , is surgery to deliver your baby through a cut, called an incision, that the doctor makes in your lower belly and uterus. You may have some pain in your lower belly and need pain medicine for 1 to 2 weeks. You can expect some vaginal bleeding for several weeks. You will probably need about 6 weeks to fully recover. It is important to take it easy while the incision is healing. Avoid heavy lifting, strenuous activities, or exercises that strain the belly muscles while you are recovering. Ask a family member or friend for help with housework, cooking, and shopping. This care sheet gives you a general idea about how long it will take for you to recover. But each person recovers at a different pace. Follow the steps below to get better as quickly as possible. How can you care for yourself at home? Activity  · Rest when you feel tired. Getting enough sleep will help you recover. · Try to walk each day. Start by walking a little more than you did the day before. Bit by bit, increase the amount you walk.  Walking boosts blood flow and helps prevent pneumonia, constipation, and blood clots. · Avoid strenuous activities, such as bicycle riding, jogging, weightlifting, and aerobic exercise, for 6 weeks or until your doctor says it is okay. · Until your doctor says it is okay, do not lift anything heavier than your baby. · Do not do sit-ups or other exercises that strain the belly muscles for 6 weeks or until your doctor says it is okay. · Hold a pillow over your incision when you cough or take deep breaths. This will support your belly and decrease your pain. · You may shower as usual. Pat the incision dry when you are done. · You will have some vaginal bleeding. Wear sanitary pads. Do not douche or use tampons until your doctor says it is okay. · Ask your doctor when you can drive again. · You will probably need to take at least 6 weeks off work. It depends on the type of work you do and how you feel. · Ask your doctor when it is okay for you to have sex. Diet  · You can eat your normal diet. If your stomach is upset, try bland, low-fat foods like plain rice, broiled chicken, toast, and yogurt. · Drink plenty of fluids (unless your doctor tells you not to). · You may notice that your bowel movements are not regular right after your surgery. This is common. Try to avoid constipation and straining with bowel movements. You may want to take a fiber supplement every day. If you have not had a bowel movement after a couple of days, ask your doctor about taking a mild laxative. · If you are breastfeeding, do not drink any alcohol. Medicines  · Your doctor will tell you if and when you can restart your medicines. He or she will also give you instructions about taking any new medicines. · If you take blood thinners, such as warfarin (Coumadin), clopidogrel (Plavix), or aspirin, be sure to talk to your doctor. He or she will tell you if and when to start taking those medicines again.  Make sure that you understand exactly what your doctor wants you to do. · Take pain medicines exactly as directed. ¨ If the doctor gave you a prescription medicine for pain, take it as prescribed. ¨ If you are not taking a prescription pain medicine, ask your doctor if you can take an over-the-counter medicine. · If you think your pain medicine is making you sick to your stomach:  ¨ Take your medicine after meals (unless your doctor has told you not to). ¨ Ask your doctor for a different pain medicine. · If your doctor prescribed antibiotics, take them as directed. Do not stop taking them just because you feel better. You need to take the full course of antibiotics. Incision care  · If you have strips of tape on the incision, leave the tape on for a week or until it falls off. · Wash the area daily with warm, soapy water, and pat it dry. Don't use hydrogen peroxide or alcohol, which can slow healing. You may cover the area with a gauze bandage if it weeps or rubs against clothing. Change the bandage every day. · Keep the area clean and dry. Other instructions  · If you breastfeed your baby, you may be more comfortable while you are healing if you place the baby so that he or she is not resting on your belly. Try tucking your baby under your arm, with his or her body along the side you will be feeding on. Support your baby's upper body with your arm. With that hand you can control your baby's head to bring his or her mouth to your breast. This is sometimes called the football hold. Follow-up care is a key part of your treatment and safety. Be sure to make and go to all appointments, and call your doctor if you are having problems. It's also a good idea to know your test results and keep a list of the medicines you take. When should you call for help? Call 911 anytime you think you may need emergency care. For example, call if:  · You passed out (lost consciousness). · You have symptoms of a blood clot in your lung (called a pulmonary embolism).  These may include:  ¨ Sudden chest pain. ¨ Trouble breathing. ¨ Coughing up blood. · You have thoughts of harming yourself, your baby, or another person. Call your doctor now or seek immediate medical care if:  · You have severe vaginal bleeding. This means that you are soaking through a pad every hour for 2 or more hours. · You are dizzy or lightheaded, or you feel like you may faint. · You have new or more belly pain. · You have loose stitches, or your incision comes open. · You have symptoms of infection, such as:  ¨ Increased pain, swelling, warmth, or redness. ¨ Red streaks leading from the incision. ¨ Pus draining from the incision. ¨ A fever. · You have symptoms of a blood clot in your leg (called a deep vein thrombosis), such as:  ¨ Pain in your calf, back of the knee, thigh, or groin. ¨ Redness and swelling in your leg or groin. Watch closely for changes in your health, and be sure to contact your doctor if:  · You feel sad, anxious, or hopeless for more than a few days. · You do not get better as expected. Where can you learn more? Go to http://sowmya-marisol.info/. Enter M806 in the search box to learn more about \" Section: What to Expect at Home. \"  Current as of: 2017  Content Version: 11.3  © 9858-9453 [employer]. Care instructions adapted under license by [employer] (which disclaims liability or warranty for this information). If you have questions about a medical condition or this instruction, always ask your healthcare professional. [place] [address] any warranty or liability for your use of this information.

## 2017-10-19 NOTE — DISCHARGE SUMMARY
Post-Operative Day Number 3 Progress/Discharge Note    Patient doing well post-op day 3 from  delivery without significant complaints. Pain controlled on current medication. Voiding without difficulty, normal lochia. Vitals:  Patient Vitals for the past 8 hrs:   BP Temp Pulse Resp   10/19/17 0714 111/64 98.4 °F (36.9 °C) 68 18     Temp (24hrs), Av.1 °F (36.7 °C), Min:97.9 °F (36.6 °C), Max:98.4 °F (36.9 °C)      Vital signs stable, afebrile. Exam:  Patient without distress. Abdomen soft, fundus firm at level of umbilicus, non tender. Incision dry and                      clean without erythema. Lower extremities are negative for swelling, cords or tenderness. Lab/Data Review: All lab results for the last 24 hours reviewed. Assessment and Plan:  Patient appears to be having uncomplicated post- course. Continue routine post-op care and maternal education. Plan discharge for today with follow up in our office in 1-2 weeks.

## 2017-10-19 NOTE — LACTATION NOTE
Met with mom in [facility] for lactation visit. Mom plans to just pump and bottle feed since she will be going back to work. Is trying to pump every 3 hours. Discussed supply and demand nature of milk production and that mom will typically produce more milk with increased stimulation to the breasts. Encouraged to continue pumping frequently. Getting about 2 ml colostrum each time. Encouraged that amount should increase in next few days. Discussed benefits of renting pump for extra stimulation and mom will consider. Encouraged to call out with any needs.

## 2017-10-19 NOTE — PROGRESS NOTES
Shift assessment complete. Pt pumping at this time. Questions encouraged and answered. Pt encouraged to call for needs or concerns. Verbalized understanding.

## 2017-10-19 NOTE — PROGRESS NOTES
SW assessment as twins are in the NICU (34w2d). Baby A: Coe. Baby B: Jana Son. Patient states that she has a strong support system of local family members. Additionally, she has consistent transportation to/from hospital.  Patient confirms a history of depression, but states that this occurred 9 years ago due to specific circumstances. Patient denies any history of postpartum depression/anxiety.  provided education and literature on support available thru [employer] ([employer]I). PSI Warmline:  2-329-179-4PPD (6142). WWW. POSTPARTUM. NET    Family was informed of signs/symptoms, forms of intervention (medication, counseling, education), and resources (local coordinators available telephonically, monthly support group in [place], weekly \"chat with expert\" phone sessions). Additionally, patient was provided with [facility] Checklist.\"      Discussed importance of self-care and accepting help when offered. Family was encouraged to contact me with any questions/needs -  contact information provided.       Meron Cheshire, [address] [facility]

## 2017-10-20 NOTE — PROGRESS NOTES
Patient given discharge instructions earlier. Ready for discharge. VS stable, no complaints or needs. Infants in SCN. Patient walked out for discharge.

## 2017-10-30 NOTE — PROGRESS NOTES
Referral made to [facility]  home visit program for twin [address], [facility]  [phone]

## 2017-11-02 NOTE — PROGRESS NOTES
Referral made to [facility]  home visit program (twin B).     Zeynep De Jesus, [address] [facility]

## 2017-11-02 NOTE — PROGRESS NOTES
Referral made to [facility]  home visit program.    Kassy Mccauley, [address] [facility]

## 2017-11-30 PROBLEM — Z23 ENCOUNTER FOR IMMUNIZATION: Status: RESOLVED | Noted: 2017-10-04 | Resolved: 2017-11-30

## 2017-11-30 PROBLEM — O35.2XX0: Status: RESOLVED | Noted: 2017-05-18 | Resolved: 2017-11-30

## 2017-11-30 PROBLEM — Z82.79 FAMILY HISTORY OF CLEFT PALATE: Status: RESOLVED | Noted: 2017-04-25 | Resolved: 2017-11-30

## 2017-11-30 PROBLEM — O34.43 HISTORY OF LOOP ELECTROSURGICAL EXCISION PROCEDURE (LEEP) OF CERVIX AFFECTING PREGNANCY IN THIRD TRIMESTER: Status: RESOLVED | Noted: 2017-04-25 | Resolved: 2017-11-30

## 2017-11-30 PROBLEM — O30.043 DICHORIONIC DIAMNIOTIC TWIN PREGNANCY IN THIRD TRIMESTER: Status: RESOLVED | Noted: 2017-04-25 | Resolved: 2017-11-30

## 2017-11-30 PROBLEM — Z98.890 HISTORY OF LOOP ELECTROSURGICAL EXCISION PROCEDURE (LEEP) OF CERVIX AFFECTING PREGNANCY IN THIRD TRIMESTER: Status: RESOLVED | Noted: 2017-04-25 | Resolved: 2017-11-30

## 2019-06-26 PROBLEM — O98.319 GENITAL HERPES AFFECTING PREGNANCY: Status: ACTIVE | Noted: 2019-06-26

## 2019-06-26 PROBLEM — A60.09 GENITAL HERPES AFFECTING PREGNANCY: Status: ACTIVE | Noted: 2019-06-26

## 2019-06-26 PROBLEM — Z86.59 HISTORY OF POSTPARTUM DEPRESSION: Status: ACTIVE | Noted: 2019-06-26

## 2019-06-26 PROBLEM — Z98.891 HISTORY OF C-SECTION: Status: ACTIVE | Noted: 2019-06-26

## 2019-06-26 PROBLEM — K90.0 CELIAC DISEASE: Status: ACTIVE | Noted: 2019-06-26

## 2019-06-26 PROBLEM — Z87.59 HISTORY OF POSTPARTUM DEPRESSION: Status: ACTIVE | Noted: 2019-06-26

## 2019-06-26 PROBLEM — O09.899 HISTORY OF PRETERM DELIVERY, CURRENTLY PREGNANT: Status: ACTIVE | Noted: 2019-06-26

## 2019-06-26 PROBLEM — E03.9 ACQUIRED HYPOTHYROIDISM: Status: ACTIVE | Noted: 2019-06-26

## 2019-06-26 PROBLEM — Z98.890 HISTORY OF LOOP ELECTRICAL EXCISION PROCEDURE (LEEP): Status: ACTIVE | Noted: 2019-06-26

## 2019-07-24 PROBLEM — K90.0 CELIAC DISEASE: Status: RESOLVED | Noted: 2019-06-26 | Resolved: 2019-07-24

## 2019-07-25 PROBLEM — O34.219 HISTORY OF CESAREAN SECTION COMPLICATING PREGNANCY: Status: ACTIVE | Noted: 2019-06-26

## 2019-07-25 PROBLEM — O34.41 HISTORY OF LOOP ELECTROSURGICAL EXCISION PROCEDURE (LEEP) OF CERVIX AFFECTING PREGNANCY IN FIRST TRIMESTER: Status: ACTIVE | Noted: 2019-06-26

## 2019-07-25 PROBLEM — O99.891 HISTORY OF POSTPARTUM DEPRESSION, CURRENTLY PREGNANT: Status: ACTIVE | Noted: 2019-06-26

## 2019-07-25 PROBLEM — O99.281 HYPOTHYROIDISM AFFECTING PREGNANCY IN FIRST TRIMESTER: Status: ACTIVE | Noted: 2019-06-26

## 2019-07-25 PROBLEM — O09.91 HIGH-RISK PREGNANCY IN FIRST TRIMESTER: Status: ACTIVE | Noted: 2019-07-25

## 2019-07-25 PROBLEM — Z36.82 NUCHAL TRANSLUCENCY OF FETUS ON PRENATAL ULTRASOUND: Status: ACTIVE | Noted: 2019-07-25

## 2019-09-10 PROBLEM — Z36.82 NUCHAL TRANSLUCENCY OF FETUS ON PRENATAL ULTRASOUND: Status: RESOLVED | Noted: 2019-07-25 | Resolved: 2019-09-10

## 2019-09-11 PROBLEM — O99.282 HYPOTHYROIDISM AFFECTING PREGNANCY IN SECOND TRIMESTER: Status: ACTIVE | Noted: 2019-06-26

## 2019-09-11 PROBLEM — O34.42 HISTORY OF LOOP ELECTROSURGICAL EXCISION PROCEDURE (LEEP) OF CERVIX AFFECTING PREGNANCY IN SECOND TRIMESTER: Status: ACTIVE | Noted: 2019-06-26

## 2019-09-11 PROBLEM — O09.92 HIGH-RISK PREGNANCY IN SECOND TRIMESTER: Status: ACTIVE | Noted: 2019-07-25

## 2019-11-13 PROBLEM — O99.213 OBESITY COMPLICATING PREGNANCY IN THIRD TRIMESTER: Status: ACTIVE | Noted: 2019-11-13

## 2019-11-13 PROBLEM — O99.283 HYPOTHYROIDISM AFFECTING PREGNANCY IN THIRD TRIMESTER: Status: ACTIVE | Noted: 2019-06-26

## 2019-11-13 PROBLEM — O09.93 HIGH-RISK PREGNANCY IN THIRD TRIMESTER: Status: ACTIVE | Noted: 2019-07-25

## 2019-11-13 PROBLEM — O34.43 HISTORY OF LOOP ELECTROSURGICAL EXCISION PROCEDURE (LEEP) OF CERVIX AFFECTING PREGNANCY IN THIRD TRIMESTER: Status: ACTIVE | Noted: 2019-06-26

## 2019-11-13 PROBLEM — O09.92 HIGH-RISK PREGNANCY IN SECOND TRIMESTER: Status: RESOLVED | Noted: 2019-07-25 | Resolved: 2019-11-13

## 2020-01-08 PROBLEM — Z23 ENCOUNTER FOR IMMUNIZATION: Status: ACTIVE | Noted: 2020-01-08

## 2020-01-22 ENCOUNTER — HOSPITAL ENCOUNTER (OUTPATIENT)
Age: 35
Discharge: HOME | End: 2020-01-22
Attending: OBSTETRICS & GYNECOLOGY | Admitting: OBSTETRICS & GYNECOLOGY
Payer: COMMERCIAL

## 2020-01-22 VITALS — WEIGHT: 242 LBS | TEMPERATURE: 98 F | BODY MASS INDEX: 40.27 KG/M2

## 2020-01-22 PROBLEM — O47.1 FALSE LABOR AFTER 37 WEEKS OF GESTATION WITHOUT DELIVERY: Status: ACTIVE | Noted: 2020-01-22

## 2020-01-22 PROCEDURE — 99282 EMERGENCY DEPT VISIT SF MDM: CPT

## 2020-01-22 NOTE — DISCHARGE INSTRUCTIONS
Patient Education        [place] Contractions: Care Instructions  Your Care Instructions    Dare Moody contractions prepare your uterus for labor. Think of them as a \"warm-up\" exercise that your body does. You may begin to feel them between the 28th and 30th weeks of your pregnancy. But they start as early as the 20th week. Ced Moody contractions usually occur more often during the ninth month. They may go away when you are active and return when you rest. These contractions are like mild contractions of true labor, but they occur less often. (You feel fewer than 8 in an hour.) They don't cause your cervix to open. It may be hard for you to tell the difference between [place] contractions and true labor, especially in your first pregnancy. Follow-up care is a key part of your treatment and safety. Be sure to make and go to all appointments, and call your doctor if you are having problems. It's also a good idea to know your test results and keep a list of the medicines you take. How can you care for yourself at home? · Try a warm bath to help relieve muscle tension and reduce pain. · Change positions every 30 minutes. Take breaks if you must sit for a long time. Get up and walk around. · Drink plenty of water, enough so that your urine is light yellow or clear like water. · Taking short walks may help you feel better. Your doctor needs to check any contractions that are getting stronger or closer together. Where can you learn more? Go to http://sowmya-marisol.info/. Enter 764 [id number] [id number] in the search box to learn more about \"Dare Moody Contractions: Care Instructions. \"  Current as of: May 29, 2019  Content Version: 12.2  © 2460-0142 [employer]. Care instructions adapted under license by [employer] (which disclaims liability or warranty for this information).  If you have questions about a medical condition or this instruction, always ask your healthcare professional. [place] [address] any warranty or liability for your use of this information. Patient Education        Learning About When to Call Your Doctor During Pregnancy (After 20 Weeks)  Your Care Instructions  It's common to have concerns about what might be a problem during pregnancy. Although most pregnant women don't have any serious problems, it's important to know when to call your doctor if you have certain symptoms or signs of labor. These are general suggestions. Your doctor may give you some more information about when to call. When to call your doctor (after 20 weeks)  Call 911 anytime you think you may need emergency care. For example, call if:  · You have severe vaginal bleeding. · You have sudden, severe pain in your belly. · You passed out (lost consciousness). · You have a seizure. · You see or feel the umbilical cord. · You think you are about to deliver your baby and can't make it safely to the hospital.  Call your doctor now or seek immediate medical care if:  · You have vaginal bleeding. · You have belly pain. · You have a fever. · You have symptoms of preeclampsia, such as:  ? Sudden swelling of your face, hands, or feet. ? New vision problems (such as dimness, blurring, or seeing spots). ? A severe headache. · You have a sudden release of fluid from your vagina. (You think your water broke.)  · You think that you may be in labor. This means that you've had at least 6 contractions in an hour. · You notice that your baby has stopped moving or is moving much less than normal.  · You have symptoms of a urinary tract infection. These may include:  ? Pain or burning when you urinate. ? A frequent need to urinate without being able to pass much urine. ? Pain in the flank, which is just below the rib cage and above the waist on either side of the back. ? Blood in your urine.   Watch closely for changes in your health, and be sure to contact your doctor if:  · You have vaginal discharge that smells bad. · You have skin changes, such as:  ? A rash. ? Itching. ? Yellow color to your skin. · You have other concerns about your pregnancy. If you have labor signs at 37 weeks or more  If you have signs of labor at 37 weeks or more, your doctor may tell you to call when your labor becomes more active. Symptoms of active labor include:  · Contractions that are regular. · Contractions that are less than 5 minutes apart. · Contractions that are hard to talk through. Follow-up care is a key part of your treatment and safety. Be sure to make and go to all appointments, and call your doctor if you are having problems. It's also a good idea to know your test results and keep a list of the medicines you take. Where can you learn more? Go to http://sowmya-marisol.info/. Enter  in the search box to learn more about \"Learning About When to Call Your Doctor During Pregnancy (After 20 Weeks). \"  Current as of: May 29, 2019  Content Version: 12.2  © 7420-3207 [employer]. Care instructions adapted under license by [employer] (which disclaims liability or warranty for this information). If you have questions about a medical condition or this instruction, always ask your healthcare professional. [address] any warranty or liability for your use of this information.

## 2020-01-22 NOTE — ED PROVIDER NOTES
Chief Complaint: contractions      29 y.o. female [id number] at 38w0d  weeks gestation who is seen for mild abdominal pain. Pt reports contractions since 14:00 with loss of mucous plug. All pt's deliveries have been early. She has had 2 vaginal deliveries and a csection for twins. The pt and her doctor have not yet fully decided whether she will  or have a repeat csection. She denies vaginal bleeding or unusual vaginal pain. HISTORY:    Social History     Substance and Sexual Activity   Sexual Activity Yes    Partners: Male    Birth control/protection: None    Comment: Not preventing     Patient's last menstrual period was 2019.     Social History     Socioeconomic History    Marital status: SINGLE     Spouse name: Not on file    Number of children: Not on file    Years of education: Not on file    Highest education level: Not on file   Occupational History    Not on file   Social Needs    Financial resource strain: Not on file    Food insecurity:     Worry: Not on file     Inability: Not on file    Transportation needs:     Medical: Not on file     Non-medical: Not on file   Tobacco Use    Smoking status: Never Smoker    Smokeless tobacco: Never Used   Substance and Sexual Activity    Alcohol use: No    Drug use: No    Sexual activity: Yes     Partners: Male     Birth control/protection: None     Comment: Not preventing   Lifestyle    Physical activity:     Days per week: Not on file     Minutes per session: Not on file    Stress: Not on file   Relationships    Social connections:     Talks on phone: Not on file     Gets together: Not on file     Attends Judaism service: Not on file     Active member of club or organization: Not on file     Attends meetings of clubs or organizations: Not on file     Relationship status: Not on file    Intimate partner violence:     Fear of current or ex partner: Not on file     Emotionally abused: Not on file     Physically abused: Not on file Forced sexual activity: Not on file   Other Topics Concern    Not on file   Social History Narrative    Not on file       Past Surgical History:   Procedure Laterality Date    HX BREAST AUGMENTATION  2008    HX  SECTION  10/16/2017    Twins- Male    HX COLPOSCOPY      HX LEEP PROCEDURE      HX OTHER SURGICAL      Fulguration of Endometriosis    HX OTHER SURGICAL  2015    Bx to intestines (celiac dz)    HX PELVIC LAPAROSCOPY  , ,     HX WISDOM TEETH EXTRACTION  2007       Past Medical History:   Diagnosis Date    Abnormal Pap smear 2011 & 2015    HPV +    Abnormal Papanicolaou smear of cervix  &      2004    Asthma     childhood    Celiac disease     Depression     no meds    Endometriosis     Female dyspareunia     Genital herpes     Herpes simplex without mention of complication     Type 2    High-risk pregnancy in second trimester 2019    Flu vaccine given 10/16/19  2019 at [facility]:  Normal NT, NB present. Genetic counseling done by physician. Pt declines testing. May lift up to 45# at work at this time; limit if concerns develop 2019 at [facility]:  Normal anatomy/fetal Echo. Normal CL at 4.3 cm. Hx of PTL and delivery at 35 weeks in 2 pregnancies prior to last Twin Pregnancy. 10/3/2019 at [facility]: Normal CL 3.7 cm, no funneling    History of hyperthyroidism 3/2014    treated w/radiation    Hypothyroidism     managed w/med    Infertility, female     Miscarriage     Pelvic pain in female 10/8/2015    Postpartum depression     with last pregnancy-twins     labor in third trimester without delivery 10/16/2017         ROS:  A 12 point review of symptoms negative except for chief complaint as described above. PHYSICAL EXAM:  Temperature 98 °F (36.7 °C), weight 109.8 kg (242 lb), last menstrual period 2019, not currently breastfeeding. Constitutional: The patient appears well, alert, oriented x 3.   Appears comfortable  Cardiovascular: Heart RRR, no murmurs. Respiratory: Lungs clear, no respiratory distress  GI: Abdomen soft, nontender, no guarding  No fundal tenderness  Musculoskeletal: no cva tenderness  Upper ext: no edema, reflexes +2  Lower ext: no edema, neg hussain's, reflexes +2  Skin: no rashes or lesions  Psychiatric:Mood/ Affect: appropriate  Genitourinary: SVE:1/40/-2  FHT:reactive, cat 1  TOCO: rare     I personally reviewed pt's medical record including relevant labs and ultrasounds  I reviewed the NST at today's encounter    Assessment/Plan:  30 yo [id number] at 38 weeks with some contractions- not in active labor.   Reactive nst  Home with precautions

## 2020-01-23 ENCOUNTER — HOSPITAL ENCOUNTER (INPATIENT)
Age: 35
LOS: 3 days | Discharge: HOME | End: 2020-01-26
Attending: OBSTETRICS & GYNECOLOGY | Admitting: OBSTETRICS & GYNECOLOGY
Payer: COMMERCIAL

## 2020-01-23 ENCOUNTER — ANESTHESIA (OUTPATIENT)
Dept: LABOR AND DELIVERY | Age: 35
End: 2020-01-23
Payer: COMMERCIAL

## 2020-01-23 ENCOUNTER — ANESTHESIA EVENT (OUTPATIENT)
Dept: LABOR AND DELIVERY | Age: 35
End: 2020-01-23
Payer: COMMERCIAL

## 2020-01-23 DIAGNOSIS — O34.219 HISTORY OF CESAREAN SECTION COMPLICATING PREGNANCY: Primary | ICD-10-CM

## 2020-01-23 PROBLEM — O42.90 PROM (PREMATURE RUPTURE OF MEMBRANES): Status: ACTIVE | Noted: 2020-01-23

## 2020-01-23 LAB
A1 MICROGLOB PLACENTAL VAG QL: POSITIVE
ABO + RH BLD: NORMAL
ARTERIAL PATENCY WRIST A: ABNORMAL
ARTERIAL PATENCY WRIST A: ABNORMAL
BASE DEFICIT BLD-SCNC: 1 MMOL/L
BASE EXCESS BLD CALC-SCNC: 0 MMOL/L
BASOPHILS # BLD: 0 K/UL (ref 0–0.2)
BASOPHILS NFR BLD: 0 % (ref 0–2)
BDY SITE: ABNORMAL
BDY SITE: ABNORMAL
BLOOD GROUP ANTIBODIES SERPL: NORMAL
CO2 BLD-SCNC: 26 MMOL/L
CO2 BLD-SCNC: 29 MMOL/L
COLLECT TIME,HTIME: 1346
COLLECT TIME,HTIME: 1346
CONTROL LINE PRESENT?: NORMAL
DIFFERENTIAL METHOD BLD: ABNORMAL
EOSINOPHIL # BLD: 0.1 K/UL (ref 0–0.8)
EOSINOPHIL NFR BLD: 1 % (ref 0.5–7.8)
ERYTHROCYTE [DISTWIDTH] IN BLOOD BY AUTOMATED COUNT: 13.1 % (ref 11.9–14.6)
EXPIRATION DATE: NORMAL
GAS FLOW.O2 O2 DELIVERY SYS: ABNORMAL L/MIN
GAS FLOW.O2 O2 DELIVERY SYS: ABNORMAL L/MIN
HCO3 BLD-SCNC: 27 MMOL/L (ref 22–26)
HCO3 BLDV-SCNC: 24.7 MMOL/L (ref 23–28)
HCT VFR BLD AUTO: 42.5 % (ref 35.8–46.3)
HGB BLD-MCNC: 14.2 G/DL (ref 11.7–15.4)
IMM GRANULOCYTES # BLD AUTO: 0.1 K/UL (ref 0–0.5)
IMM GRANULOCYTES NFR BLD AUTO: 0 % (ref 0–5)
INTERNAL NEGATIVE CONTROL: NORMAL
KIT LOT NO.: NORMAL
LYMPHOCYTES # BLD: 1.1 K/UL (ref 0.5–4.6)
LYMPHOCYTES NFR BLD: 10 % (ref 13–44)
MCH RBC QN AUTO: 31.6 PG (ref 26.1–32.9)
MCHC RBC AUTO-ENTMCNC: 33.4 G/DL (ref 31.4–35)
MCV RBC AUTO: 94.7 FL (ref 79.6–97.8)
MONOCYTES # BLD: 0.6 K/UL (ref 0.1–1.3)
MONOCYTES NFR BLD: 5 % (ref 4–12)
NEUTS SEG # BLD: 9.9 K/UL (ref 1.7–8.2)
NEUTS SEG NFR BLD: 84 % (ref 43–78)
NRBC # BLD: 0 K/UL (ref 0–0.2)
PCO2 BLDCO: 43 MMHG (ref 32–68)
PCO2 BLDCO: 50 MMHG (ref 32–68)
PH BLDCO: 7.34 [PH] (ref 7.15–7.38)
PH BLDCO: 7.37 [PH] (ref 7.15–7.38)
PLATELET # BLD AUTO: 210 K/UL (ref 150–450)
PMV BLD AUTO: 10.7 FL (ref 9.4–12.3)
PO2 BLDCO: 17 MMHG
PO2 BLDCO: 30 MMHG
RBC # BLD AUTO: 4.49 M/UL (ref 4.05–5.2)
SAO2 % BLD: 21 % (ref 95–98)
SAO2 % BLDV: 54 % (ref 65–88)
SERVICE CMNT-IMP: ABNORMAL
SERVICE CMNT-IMP: ABNORMAL
SPECIMEN EXP DATE BLD: NORMAL
SPECIMEN TYPE: ABNORMAL
SPECIMEN TYPE: ABNORMAL
WBC # BLD AUTO: 11.8 K/UL (ref 4.3–11.1)

## 2020-01-23 PROCEDURE — 4A1HXCZ MONITORING OF PRODUCTS OF CONCEPTION, CARDIAC RATE, EXTERNAL APPROACH: ICD-10-PCS | Performed by: OBSTETRICS & GYNECOLOGY

## 2020-01-23 PROCEDURE — 86900 BLOOD TYPING SEROLOGIC ABO: CPT

## 2020-01-23 PROCEDURE — 75410000003 HC RECOV DEL/VAG/CSECN EA 0.5 HR: Performed by: OBSTETRICS & GYNECOLOGY

## 2020-01-23 PROCEDURE — 77030032490 HC SLV COMPR SCD KNE COVD -B: Performed by: OBSTETRICS & GYNECOLOGY

## 2020-01-23 PROCEDURE — 74011250636 HC RX REV CODE- 250/636: Performed by: ANESTHESIOLOGY

## 2020-01-23 PROCEDURE — 77030040361 HC SLV COMPR DVT MDII -B

## 2020-01-23 PROCEDURE — 77030018836 HC SOL IRR NACL ICUM -A: Performed by: OBSTETRICS & GYNECOLOGY

## 2020-01-23 PROCEDURE — 74011250636 HC RX REV CODE- 250/636: Performed by: OBSTETRICS & GYNECOLOGY

## 2020-01-23 PROCEDURE — 99283 EMERGENCY DEPT VISIT LOW MDM: CPT | Performed by: OBSTETRICS & GYNECOLOGY

## 2020-01-23 PROCEDURE — 76060000078 HC EPIDURAL ANESTHESIA: Performed by: OBSTETRICS & GYNECOLOGY

## 2020-01-23 PROCEDURE — 77030020254 HC SOL INJ D5LR LACTATED RINGER

## 2020-01-23 PROCEDURE — 77030034696 HC CATH URETH FOL 2W BARD -A

## 2020-01-23 PROCEDURE — 74011250636 HC RX REV CODE- 250/636: Performed by: NURSE ANESTHETIST, CERTIFIED REGISTERED

## 2020-01-23 PROCEDURE — 77030034696 HC CATH URETH FOL 2W BARD -A: Performed by: OBSTETRICS & GYNECOLOGY

## 2020-01-23 PROCEDURE — 77030002888 HC SUT CHRMC J&J -A: Performed by: OBSTETRICS & GYNECOLOGY

## 2020-01-23 PROCEDURE — 74011000250 HC RX REV CODE- 250: Performed by: NURSE ANESTHETIST, CERTIFIED REGISTERED

## 2020-01-23 PROCEDURE — 85025 COMPLETE CBC W/AUTO DIFF WBC: CPT

## 2020-01-23 PROCEDURE — A4300 CATH IMPL VASC ACCESS PORTAL: HCPCS | Performed by: ANESTHESIOLOGY

## 2020-01-23 PROCEDURE — 74011250637 HC RX REV CODE- 250/637: Performed by: OBSTETRICS & GYNECOLOGY

## 2020-01-23 PROCEDURE — 77030014125 HC TY EPDRL BBMI -B: Performed by: ANESTHESIOLOGY

## 2020-01-23 PROCEDURE — 74011250637 HC RX REV CODE- 250/637: Performed by: ANESTHESIOLOGY

## 2020-01-23 PROCEDURE — 76010000391 HC C SECN FIRST 1 HR: Performed by: OBSTETRICS & GYNECOLOGY

## 2020-01-23 PROCEDURE — 77030031139 HC SUT VCRL2 J&J -A: Performed by: OBSTETRICS & GYNECOLOGY

## 2020-01-23 PROCEDURE — 84112 EVAL AMNIOTIC FLUID PROTEIN: CPT | Performed by: OBSTETRICS & GYNECOLOGY

## 2020-01-23 PROCEDURE — 77030018846 HC SOL IRR STRL H20 ICUM -A: Performed by: OBSTETRICS & GYNECOLOGY

## 2020-01-23 PROCEDURE — 74011250636 HC RX REV CODE- 250/636

## 2020-01-23 PROCEDURE — 74011000250 HC RX REV CODE- 250: Performed by: ANESTHESIOLOGY

## 2020-01-23 PROCEDURE — 59025 FETAL NON-STRESS TEST: CPT

## 2020-01-23 PROCEDURE — 82803 BLOOD GASES ANY COMBINATION: CPT

## 2020-01-23 PROCEDURE — 65270000029 HC RM PRIVATE

## 2020-01-23 RX ORDER — DOCUSATE SODIUM 100 MG/1
100 CAPSULE, LIQUID FILLED ORAL 2 TIMES DAILY
Status: DISCONTINUED | OUTPATIENT
Start: 2020-01-23 | End: 2020-01-26 | Stop reason: HOSPADM

## 2020-01-23 RX ORDER — SODIUM CHLORIDE, SODIUM LACTATE, POTASSIUM CHLORIDE, CALCIUM CHLORIDE 600; 310; 30; 20 MG/100ML; MG/100ML; MG/100ML; MG/100ML
125 INJECTION, SOLUTION INTRAVENOUS CONTINUOUS
Status: DISCONTINUED | OUTPATIENT
Start: 2020-01-23 | End: 2020-01-24 | Stop reason: ALTCHOICE

## 2020-01-23 RX ORDER — BUPIVACAINE HYDROCHLORIDE 7.5 MG/ML
INJECTION, SOLUTION INTRASPINAL
Status: COMPLETED | OUTPATIENT
Start: 2020-01-23 | End: 2020-01-23

## 2020-01-23 RX ORDER — SODIUM CHLORIDE, SODIUM LACTATE, POTASSIUM CHLORIDE, CALCIUM CHLORIDE 600; 310; 30; 20 MG/100ML; MG/100ML; MG/100ML; MG/100ML
500 INJECTION, SOLUTION INTRAVENOUS CONTINUOUS
Status: DISCONTINUED | OUTPATIENT
Start: 2020-01-23 | End: 2020-01-23 | Stop reason: HOSPADM

## 2020-01-23 RX ORDER — SODIUM CHLORIDE 0.9 % (FLUSH) 0.9 %
5-40 SYRINGE (ML) INJECTION AS NEEDED
Status: DISCONTINUED | OUTPATIENT
Start: 2020-01-23 | End: 2020-01-23 | Stop reason: HOSPADM

## 2020-01-23 RX ORDER — SIMETHICONE 80 MG
80 TABLET,CHEWABLE ORAL
Status: DISCONTINUED | OUTPATIENT
Start: 2020-01-23 | End: 2020-01-26 | Stop reason: HOSPADM

## 2020-01-23 RX ORDER — NALBUPHINE HYDROCHLORIDE 10 MG/ML
5 INJECTION, SOLUTION INTRAMUSCULAR; INTRAVENOUS; SUBCUTANEOUS
Status: ACTIVE | OUTPATIENT
Start: 2020-01-23 | End: 2020-01-24

## 2020-01-23 RX ORDER — VALACYCLOVIR HYDROCHLORIDE 500 MG/1
500 TABLET, FILM COATED ORAL 2 TIMES DAILY
Status: DISCONTINUED | OUTPATIENT
Start: 2020-01-23 | End: 2020-01-26 | Stop reason: HOSPADM

## 2020-01-23 RX ORDER — OXYTOCIN/RINGER'S LACTATE 30/500 ML
250 PLASTIC BAG, INJECTION (ML) INTRAVENOUS ONCE
Status: DISCONTINUED | OUTPATIENT
Start: 2020-01-23 | End: 2020-01-23 | Stop reason: HOSPADM

## 2020-01-23 RX ORDER — SODIUM CHLORIDE, SODIUM LACTATE, POTASSIUM CHLORIDE, CALCIUM CHLORIDE 600; 310; 30; 20 MG/100ML; MG/100ML; MG/100ML; MG/100ML
999 INJECTION, SOLUTION INTRAVENOUS AS NEEDED
Status: DISCONTINUED | OUTPATIENT
Start: 2020-01-23 | End: 2020-01-24

## 2020-01-23 RX ORDER — FAMOTIDINE 20 MG/1
20 TABLET, FILM COATED ORAL ONCE
Status: COMPLETED | OUTPATIENT
Start: 2020-01-23 | End: 2020-01-23

## 2020-01-23 RX ORDER — SODIUM CHLORIDE 0.9 % (FLUSH) 0.9 %
5-40 SYRINGE (ML) INJECTION AS NEEDED
Status: DISCONTINUED | OUTPATIENT
Start: 2020-01-23 | End: 2020-01-24

## 2020-01-23 RX ORDER — TRISODIUM CITRATE DIHYDRATE AND CITRIC ACID MONOHYDRATE 500; 334 MG/5ML; MG/5ML
30 SOLUTION ORAL ONCE
Status: COMPLETED | OUTPATIENT
Start: 2020-01-23 | End: 2020-01-23

## 2020-01-23 RX ORDER — SODIUM CHLORIDE 0.9 % (FLUSH) 0.9 %
5-40 SYRINGE (ML) INJECTION EVERY 8 HOURS
Status: DISCONTINUED | OUTPATIENT
Start: 2020-01-23 | End: 2020-01-23 | Stop reason: HOSPADM

## 2020-01-23 RX ORDER — OXYTOCIN/RINGER'S LACTATE 30/500 ML
PLASTIC BAG, INJECTION (ML) INTRAVENOUS
Status: DISCONTINUED | OUTPATIENT
Start: 2020-01-23 | End: 2020-01-23 | Stop reason: HOSPADM

## 2020-01-23 RX ORDER — NALOXONE HYDROCHLORIDE 0.4 MG/ML
0.2 INJECTION, SOLUTION INTRAMUSCULAR; INTRAVENOUS; SUBCUTANEOUS
Status: ACTIVE | OUTPATIENT
Start: 2020-01-23 | End: 2020-01-24

## 2020-01-23 RX ORDER — HYDROMORPHONE HYDROCHLORIDE 1 MG/ML
0.5 INJECTION, SOLUTION INTRAMUSCULAR; INTRAVENOUS; SUBCUTANEOUS
Status: ACTIVE | OUTPATIENT
Start: 2020-01-23 | End: 2020-01-24

## 2020-01-23 RX ORDER — MORPHINE SULFATE 1 MG/ML
INJECTION, SOLUTION EPIDURAL; INTRATHECAL; INTRAVENOUS
Status: COMPLETED | OUTPATIENT
Start: 2020-01-23 | End: 2020-01-23

## 2020-01-23 RX ORDER — KETOROLAC TROMETHAMINE 30 MG/ML
INJECTION, SOLUTION INTRAMUSCULAR; INTRAVENOUS AS NEEDED
Status: DISCONTINUED | OUTPATIENT
Start: 2020-01-23 | End: 2020-01-23 | Stop reason: HOSPADM

## 2020-01-23 RX ORDER — DEXTROSE, SODIUM CHLORIDE, SODIUM LACTATE, POTASSIUM CHLORIDE, AND CALCIUM CHLORIDE 5; .6; .31; .03; .02 G/100ML; G/100ML; G/100ML; G/100ML; G/100ML
125 INJECTION, SOLUTION INTRAVENOUS CONTINUOUS
Status: DISCONTINUED | OUTPATIENT
Start: 2020-01-23 | End: 2020-01-23 | Stop reason: HOSPADM

## 2020-01-23 RX ORDER — CEFAZOLIN SODIUM/WATER 2 G/20 ML
2 SYRINGE (ML) INTRAVENOUS ONCE
Status: COMPLETED | OUTPATIENT
Start: 2020-01-23 | End: 2020-01-23

## 2020-01-23 RX ORDER — KETOROLAC TROMETHAMINE 30 MG/ML
15 INJECTION, SOLUTION INTRAMUSCULAR; INTRAVENOUS
Status: DISCONTINUED | OUTPATIENT
Start: 2020-01-23 | End: 2020-01-24 | Stop reason: ALTCHOICE

## 2020-01-23 RX ORDER — OXYCODONE HYDROCHLORIDE 5 MG/1
10 TABLET ORAL
Status: DISPENSED | OUTPATIENT
Start: 2020-01-23 | End: 2020-01-24

## 2020-01-23 RX ORDER — ACETAMINOPHEN 325 MG/1
650 TABLET ORAL
Status: DISCONTINUED | OUTPATIENT
Start: 2020-01-23 | End: 2020-01-24 | Stop reason: ALTCHOICE

## 2020-01-23 RX ORDER — SODIUM CHLORIDE 0.9 % (FLUSH) 0.9 %
5-40 SYRINGE (ML) INJECTION EVERY 8 HOURS
Status: DISCONTINUED | OUTPATIENT
Start: 2020-01-23 | End: 2020-01-24

## 2020-01-23 RX ORDER — EPHEDRINE SULFATE/0.9% NACL/PF 50 MG/5 ML
SYRINGE (ML) INTRAVENOUS AS NEEDED
Status: DISCONTINUED | OUTPATIENT
Start: 2020-01-23 | End: 2020-01-23 | Stop reason: HOSPADM

## 2020-01-23 RX ORDER — LEVOTHYROXINE SODIUM 75 UG/1
150 TABLET ORAL
Status: DISCONTINUED | OUTPATIENT
Start: 2020-01-24 | End: 2020-01-26 | Stop reason: HOSPADM

## 2020-01-23 RX ADMIN — PHENYLEPHRINE HYDROCHLORIDE 100 MCG: 10 INJECTION INTRAVENOUS at 13:34

## 2020-01-23 RX ADMIN — Medication 2 G: at 12:46

## 2020-01-23 RX ADMIN — Medication 10 MG: at 13:35

## 2020-01-23 RX ADMIN — FAMOTIDINE 20 MG: 20 TABLET, FILM COATED ORAL at 12:46

## 2020-01-23 RX ADMIN — PHENYLEPHRINE HYDROCHLORIDE 100 MCG: 10 INJECTION INTRAVENOUS at 14:01

## 2020-01-23 RX ADMIN — KETOROLAC TROMETHAMINE 30 MG: 30 INJECTION, SOLUTION INTRAMUSCULAR; INTRAVENOUS at 14:02

## 2020-01-23 RX ADMIN — Medication 10 MG: at 14:05

## 2020-01-23 RX ADMIN — SODIUM CHLORIDE, SODIUM LACTATE, POTASSIUM CHLORIDE, AND CALCIUM CHLORIDE 125 ML/HR: 600; 310; 30; 20 INJECTION, SOLUTION INTRAVENOUS at 15:11

## 2020-01-23 RX ADMIN — PHENYLEPHRINE HYDROCHLORIDE 100 MCG: 10 INJECTION INTRAVENOUS at 13:49

## 2020-01-23 RX ADMIN — PHENYLEPHRINE HYDROCHLORIDE 100 MCG: 10 INJECTION INTRAVENOUS at 14:05

## 2020-01-23 RX ADMIN — SODIUM CHLORIDE, SODIUM LACTATE, POTASSIUM CHLORIDE, AND CALCIUM CHLORIDE 999 ML/HR: 600; 310; 30; 20 INJECTION, SOLUTION INTRAVENOUS at 11:36

## 2020-01-23 RX ADMIN — Medication 500 ML/HR: at 13:47

## 2020-01-23 RX ADMIN — SODIUM CHLORIDE, SODIUM LACTATE, POTASSIUM CHLORIDE, AND CALCIUM CHLORIDE: 600; 310; 30; 20 INJECTION, SOLUTION INTRAVENOUS at 13:21

## 2020-01-23 RX ADMIN — Medication 10 MG: at 14:01

## 2020-01-23 RX ADMIN — Medication 10 MG: at 13:37

## 2020-01-23 RX ADMIN — MORPHINE SULFATE 0.25 MG: 1 INJECTION, SOLUTION EPIDURAL; INTRATHECAL; INTRAVENOUS at 13:31

## 2020-01-23 RX ADMIN — BUPIVACAINE HYDROCHLORIDE IN DEXTROSE 12.5 MG: 7.5 INJECTION, SOLUTION SUBARACHNOID at 13:31

## 2020-01-23 RX ADMIN — DOCUSATE SODIUM 100 MG: 100 CAPSULE, LIQUID FILLED ORAL at 19:48

## 2020-01-23 RX ADMIN — Medication 1 AMPULE: at 12:45

## 2020-01-23 RX ADMIN — SODIUM CITRATE AND CITRIC ACID MONOHYDRATE 30 ML: 500; 334 SOLUTION ORAL at 12:45

## 2020-01-23 RX ADMIN — KETOROLAC TROMETHAMINE 15 MG: 30 INJECTION, SOLUTION INTRAMUSCULAR at 19:47

## 2020-01-23 NOTE — ANESTHESIA POSTPROCEDURE EVALUATION
Procedure(s):   SECTION.     spinal    Anesthesia Post Evaluation      Multimodal analgesia: multimodal analgesia used between 6 hours prior to anesthesia start to PACU discharge  Patient location during evaluation: bedside  Patient participation: complete - patient participated  Level of consciousness: awake and awake and alert  Pain management: adequate  Airway patency: patent  Anesthetic complications: no  Cardiovascular status: acceptable  Respiratory status: acceptable  Hydration status: acceptable  Post anesthesia nausea and vomiting:  controlled      Vitals Value Taken Time   /61 2020  2:40 PM   Temp     Pulse 69 2020  2:40 PM   Resp 16 2020  2:40 PM   SpO2 97 % 2020  2:40 PM

## 2020-01-23 NOTE — PROGRESS NOTES
SBAR OUT Report: Mother    Verbal report given to Kyree Manzo (full name & credentials) on this patient, who is now being transferred to [facility] (unit) for routine post - op. The patient is not wearing a green \"Anesthesia-Duramorph\" band. Report consisted of patient's Situation, Background, Assessment and Recommendations (SBAR). [place] ID bands were compared with the identification form, and verified with the patient and receiving nurse. Information from the SBAR, Procedure Summary, Intake/Output, MAR and Recent Results and the [place] Report was reviewed with the receiving nurse; opportunity for questions and clarification provided.

## 2020-01-23 NOTE — PROGRESS NOTES
sbar from [facility]. Pt assumed for care. Gottlieb draining to gravity, clear urine  Fundus firm 2 fbb umbilicus. Pitocin continues to infuse. Pt received duramorph and toradol in the OR.

## 2020-01-23 NOTE — H&P
Chief Complaint   Patient presents with    Contractions     38 1/7     HPI: Zina Potter is a [phone] who presents at 38w1d with leakage of clear fluid. She is not lacy and denies VB. Her baby is active. She has had one  for twins last pregnancy, and delivered vaginally at 35 weeks in her first two pregnancies. She is proven to 5#. She has a history of HSV and is on Valtrex. She has not noted an outbreak but was having some itching earlier in the week. Past Medical History:   Diagnosis Date    Abnormal Pap smear 2011 & 2015    HPV +    Abnormal Papanicolaou smear of cervix  &          Asthma     childhood    Celiac disease     Depression     no meds    Endometriosis     Female dyspareunia     Genital herpes     Herpes simplex without mention of complication     Type 2    High-risk pregnancy in second trimester 2019    Flu vaccine given 10/16/19  2019 at [facility]:  Normal NT, NB present. Genetic counseling done by physician. Pt declines testing. May lift up to 45# at work at this time; limit if concerns develop 2019 at [facility]:  Normal anatomy/fetal Echo. Normal CL at 4.3 cm. Hx of PTL and delivery at 35 weeks in 2 pregnancies prior to last Twin Pregnancy.  10/3/2019 at [facility]: Normal CL 3.7 cm, no funneling    History of hyperthyroidism 3/2014    treated w/radiation    Hypothyroidism     managed w/med    Infertility, female     Miscarriage     Pelvic pain in female 10/8/2015    Postpartum depression     with last pregnancy-twins     labor in third trimester without delivery 10/16/2017     Past Surgical History:   Procedure Laterality Date    HX BREAST AUGMENTATION  2008    HX  SECTION  10/16/2017    Twins- Male    HX COLPOSCOPY      HX LEEP PROCEDURE      HX OTHER SURGICAL      Fulguration of Endometriosis    HX OTHER SURGICAL  2015    Bx to intestines (celiac dz)    HX PELVIC LAPAROSCOPY  , ,     HX WISDOM TEETH EXTRACTION  2007     Prior to Admission Medications   Prescriptions Last Dose Informant Patient Reported? Taking? CALCIUM PO   Yes No   Sig: Take  by mouth. FERROUS SULFATE (IRON PO)   Yes No   Sig: Take  by mouth. PNV COMBO#47/IRON/FA #1/DHA (PNV-DHA PO)   Yes No   Sig: Take  by mouth. VITAMIN B COMPLEX NO.12-NIACIN PO   Yes No   Sig: Take  by mouth. ascorbic acid, vitamin C, (VITAMIN C) 500 mg tablet   Yes No   Sig: Take  by mouth. cholecalciferol (VITAMIN D3) 1,000 unit cap   Yes No   Sig: Take  by mouth daily. folic acid 699 mcg tablet   Yes No   Sig: Take 800 mcg by mouth daily. levothyroxine (SYNTHROID) 175 mcg tablet   Yes No   Si mcg.   valACYclovir (VALTREX) 500 mg tablet   No No   Sig: Take 1 Tab by mouth two (2) times a day. Facility-Administered Medications: None     No Known Allergies     Social History     Tobacco Use   Smoking Status Never Smoker   Smokeless Tobacco Never Used     Social History     Substance and Sexual Activity   Alcohol Use No     Social History     Substance and Sexual Activity   Drug Use No     Family History   Problem Relation Age of Onset    Stroke Maternal Uncle     Heart Disease Maternal Uncle     Thyroid Disease Maternal Uncle     Prostate Cancer Maternal Uncle     Stroke Maternal Grandfather     Cancer Maternal Grandfather         brain cancer    Ovarian Cancer Paternal Grandmother     Breast Cancer Paternal Grandmother     Alzheimer Paternal Grandmother     Breast Cancer Paternal Aunt         Twice    Depression Mother     Breast Cancer Mother     Stroke Maternal Grandmother     Heart Disease Paternal Grandfather     Thyroid Disease Other         hyperthyroidism    Thyroid Disease Other         hyperthyroidism    Heart defect Sister         Hole in Heart (closed)    Other Father         Cleft Palate     Review of Systems   All other systems reviewed and are negative.     Visit Vitals  /77 (BP 1 Location: Right arm, BP Patient Position: At rest;Sitting)   Pulse 94   Temp 97.6 °F (36.4 °C)   Resp 16   Ht 5' 5\" (1.651 m)   Wt 109.8 kg (242 lb)   BMI 40.27 kg/m²     Physical Exam  Constitutional:       Appearance: Normal appearance. Genitourinary:      Vulva normal.      Genitourinary Comments: SVE: /-2, grossly ruptured, no evidence of HSV outbreak   HENT:      Head: Normocephalic and atraumatic. Nose: Nose normal.      Mouth/Throat:      Mouth: Mucous membranes are dry. Eyes:      Extraocular Movements: Extraocular movements intact. Pupils: Pupils are equal, round, and reactive to light. Neck:      Musculoskeletal: Normal range of motion and neck supple. Cardiovascular:      Rate and Rhythm: Normal rate and regular rhythm. Pulses: Normal pulses. Heart sounds: Normal heart sounds. No murmur. No gallop. Pulmonary:      Effort: Pulmonary effort is normal. No respiratory distress. Breath sounds: Normal breath sounds. No wheezing or rales. Abdominal:      Palpations: Abdomen is soft. Musculoskeletal: Normal range of motion. Neurological:      General: No focal deficit present. Mental Status: She is alert and oriented to person, place, and time. Skin:     General: Skin is warm and dry. Psychiatric:         Mood and Affect: Mood normal.         Behavior: Behavior normal.     Amnisure positive    A/P: [id number] with PROM  -Still deciding between [facility] and repeat , has had two vaginal deliveries but is proven only to 5#, counseled briefly but Dr. Sherwin Gutierrez will come see her to discuss in full and decide on method.   -HSV- on Valtrex, no signs of outbreak on exam  -FHT- Category 1 and reactive

## 2020-01-23 NOTE — ANESTHESIA PREPROCEDURE EVALUATION
Anesthetic History   No history of anesthetic complications            Review of Systems / Medical History  Patient summary reviewed, nursing notes reviewed and pertinent labs reviewed    Pulmonary  Within defined limits                 Neuro/Psych   Within defined limits           Cardiovascular  Within defined limits                Exercise tolerance: >4 METS     GI/Hepatic/Renal  Within defined limits              Endo/Other      Hypothyroidism: well controlled  Morbid obesity     Other Findings            Physical Exam    Airway  Mallampati: III  TM Distance: 4 - 6 cm  Neck ROM: normal range of motion   Mouth opening: Normal     Cardiovascular    Rhythm: regular  Rate: normal         Dental         Pulmonary  Breath sounds clear to auscultation               Abdominal         Other Findings            Anesthetic Plan    ASA: 3, emergent  Anesthesia type: spinal      Post-op pain plan if not by surgeon: intrathecal opiates      Anesthetic plan and risks discussed with: Patient and Spouse

## 2020-01-23 NOTE — PROGRESS NOTES
sonja from [facility]. Pt assumed for care.  Ready to transport to the OR/ pt assisted to the wheelchair

## 2020-01-23 NOTE — PROGRESS NOTES
Pericare/pad change. Gottlieb emptied of 100 mls clear urine. Pt tolerated fundus exam well. Daddy holding baby now. No complaints at this time. Has tolerated suzette mist and saltine crackers well.

## 2020-01-23 NOTE — ANESTHESIA PROCEDURE NOTES
Spinal Block    Start time: 1/23/2020 1:25 PM  End time: 1/23/2020 1:31 PM  Performed by: Merline Alfred MD  Authorized by: Merline Alfred MD     Pre-procedure:   Indications: primary anesthetic  Preanesthetic Checklist: patient identified, risks and benefits discussed, anesthesia consent, site marked, patient being monitored and timeout performed    Timeout Time: 13:25          Spinal Block:   Patient Position:  Seated  Prep Region:  Lumbar  Prep: chlorhexidine      Location:  L3-4  Technique:  Single shot    Local Dose (mL):  3    Needle:   Needle Type:  Pencan  Needle Gauge:  25 G  Attempts:  1      Events: CSF confirmed, no blood with aspiration and no paresthesia        Assessment:  Insertion:  Uncomplicated  Patient tolerance:  Patient tolerated the procedure well with no immediate complications

## 2020-01-23 NOTE — L&D DELIVERY NOTE
Delivery Summary    Patient: Nelson Shields MRN: [id number]  SSN: [id number]    YOB: 1985  Age: 29 y.o. Sex: female        Information for the patient's :  Barry Canela [[id number]]       Labor Events:    Labor: No    Steroids: None   Cervical Ripening Date/Time:       Cervical Ripening Type: None   Antibiotics During Labor: No   Rupture Identifier:      Rupture Date/Time: 2020 8:05 AM   Rupture Type:     Amniotic Fluid Volume:  Moderate    Amniotic Fluid Description: Clear    Amniotic Fluid Odor:      Induction: None       Induction Date/Time:        Indications for Induction:      Augmentation: None   Augmentation Date/Time:      Indications for Augmentation:     Labor complications: None       Additional complications:        Delivery Events:  Indications For Episiotomy:     Episiotomy: None   Perineal Laceration(s): None   Repaired:     Periurethral Laceration Location:      Repaired:     Labial Laceration Location:     Repaired:     Sulcal Laceration Location:     Repaired:     Vaginal Laceration Location:     Repaired:     Cervical Laceration Location:     Repaired:     Repair Suture: None   Number of Repair Packets:     Estimated Blood Loss (ml):  ml     Delivery Date: 2020    Delivery Time: 1:46 PM   Delivery Type: , Low Transverse     Details    Trial of Labor: No   Primary/Repeat: Repeat   Priority: Routine   Indications:  Prior Uterine Surgery       Sex:  Female     Gestational Age: 38w1d  Delivery Clinician:  Severa Leaven  Living Status: Living   Delivery Location: L&D  OR OR          APGARS  One minute Five minutes Ten minutes   Skin color: 0           Heart rate: 2           Grimace: 2           Muscle tone: 2           Breathin           Totals: 8             Presentation: Vertex    Position: Left Occiput Anterior  Resuscitation Method:  Tactile Stimulation;Suctioning-bulb     Meconium Stained: None      Cord Information: 3 Vessels  Complications: None  Cord around:    Delayed cord clamping? Yes  Cord clamped date/time:2020  1:47 PM  Disposition of Cord Blood: Lab    Blood Gases Sent?: Yes    Placenta:  Date/Time: 2020  1:38 PM  Removal: Expressed      Appearance: Normal;Intact      Measurements:  Birth Weight: 3 kg      Birth Length: 48 cm      Head Circumference: 35 cm      Chest Circumference: 30 cm     Abdominal Girth: Other Providers:   Gladis HEAD;CORAZON STARR;JORGE LEW;JORGITO CORTEZ;ELIANA JOHNSON;SUSANA SANDY;TAMMY JACK;JOSE FRANCISCO, Obstetrician;Primary Nurse;Primary  Nurse;Respiratory Therapist;Scrub Tech; Anesthesiologist;Crna;Scrub Tech             Group B Strep:   Lab Results   Component Value Date/Time    GrBStrep, External negative 2013     Information for the patient's :  Juan Luis Louise [[id number]]   No results found for: ABORH, PCTABR, PCTDIG, BILI, ABORHEXT, ABORH    Recent Labs     20  1400 20  1359   PCO2CB 43 50   PO2CB 30 17   HCO3I  --  27.0*   SO2I  --  21*   IBD 1  --    SPECTI VENOUS CORD ARTERIAL CORD   PHICB 7.370 7.338   ISITE CORD CORD   IDEV OTHER OTHER   IALLEN NOT APPLICABLE NOT APPLICABLE

## 2020-01-23 NOTE — OP NOTES
Luke [place]overde [place]  [id number]      INTRAUTERINE PREGNANCY  SECTION FULL OP NOTE        DATE OF PROCEDURE:  2020    PREOPERATIVE DIAGNOSIS:  Intrauterine pregnancy at 38 1/7, premature rupture of membranes, prior                                                              section    POSTOPERATIVE DIAGNOSIS:  same with viable infant     ADDITIONAL DIAGNOSES: none    PROCEDURE: Low transverse  section    SURGEON:  Elvi Vega MD    ASSISTANT:  none    ANESTHESIA: Spinal    EBL: 702 cc    COMPLICATIONS: none    OPERATIVE PROCEDURE: Patient was placed on the operating room table in the supine position/left lateral tilt. Time out was done to confirm the operating procedure, surgeon, patient and site. Once confirmed by the team, procedure was started. After having adequate regional anesthesia by spinal injection, the patient was prepped and draped in the usual fashion for abdominal surgery. A Pfannenstiel incision was made and carried down sharply through the skin, subcutaneous tissue, and fascia. Fascia was sharply dissected free from underlying rectus muscles. The peritoneum was sharply entered and extended vertically. DeLee bladder blade was then placed over the bladder. The visceroperitoneal reflection over the lower uterine segment was incised transversely and the bladder flap sharply and bluntly developed. The uterus was incised transversely, then extended bluntly, and the infants head was delivered without difficulty and fundal pressure. Fluid was clear. Cord was clamped and cut. Mouth and nose were suctioned clean. The infant was given to the NICU personnel present at the time of delivery. The placenta was expressed, intact on inspection. The uterus was exteriorized, wrapped in a wet lap square, curetted with a dry square, and closed in a double-layered fashion with #1 chromic. Hemostasis appeared adequate. The cul-de-sac was then irrigated and suctioned clean.  The uterus was placed in the abdomen. The gutters were irrigated and suctioned clean. The peritoneum and rectus muscles were closed with 2-0 chromic. The fascia was closed with 0 vicryl. Running 2-0 plain was used to reapproximate the subcutaneous tissue. 4-0 Vicryl was used in a subcuticular stitch. The patient tolerated the procedure well and went to the recovery room in satisfactory condition.

## 2020-01-23 NOTE — PROGRESS NOTES
Factual information regarding the medical condition and the need for  section was discussed with the patient, who verbalized understanding. The therapeutic rationale, benefits, risks and potential complications were discussed, including the possibility of pain, bleeding, infection, loss of function, disfigurement, death or other unforeseen complications. Alternatives to the procedure were discussed (including potential risks, complications and benefits of each). No guarantee was expressed or implied as to the results of the procedure. Informed consent was given, as well as a request to proceed.

## 2020-01-23 NOTE — PROGRESS NOTES
SBAR IN Report: Mother    Verbal report received from Danita Coburn RN  on this patient, who is now being transferred from L&D  for routine progression of care. The patient is wearing a green \"Anesthesia-Duramorph\" band. Report consisted of patient's Situation, Background, Assessment and Recommendations (SBAR).  ID bands were compared with the identification form, and verified with the patient and transferring nurse. Information from the SBAR and the [place] Report was reviewed with the transferring nurse; opportunity for questions and clarification provided.

## 2020-01-24 LAB
BASOPHILS # BLD: 0 K/UL (ref 0–0.2)
BASOPHILS NFR BLD: 0 % (ref 0–2)
DIFFERENTIAL METHOD BLD: ABNORMAL
EOSINOPHIL # BLD: 0.1 K/UL (ref 0–0.8)
EOSINOPHIL NFR BLD: 1 % (ref 0.5–7.8)
ERYTHROCYTE [DISTWIDTH] IN BLOOD BY AUTOMATED COUNT: 13.3 % (ref 11.9–14.6)
HCT VFR BLD AUTO: 38.4 % (ref 35.8–46.3)
HGB BLD-MCNC: 12.7 G/DL (ref 11.7–15.4)
IMM GRANULOCYTES # BLD AUTO: 0.1 K/UL (ref 0–0.5)
IMM GRANULOCYTES NFR BLD AUTO: 1 % (ref 0–5)
LYMPHOCYTES # BLD: 1 K/UL (ref 0.5–4.6)
LYMPHOCYTES NFR BLD: 8 % (ref 13–44)
MCH RBC QN AUTO: 31.4 PG (ref 26.1–32.9)
MCHC RBC AUTO-ENTMCNC: 33.1 G/DL (ref 31.4–35)
MCV RBC AUTO: 95 FL (ref 79.6–97.8)
MONOCYTES # BLD: 0.6 K/UL (ref 0.1–1.3)
MONOCYTES NFR BLD: 5 % (ref 4–12)
NEUTS SEG # BLD: 11.1 K/UL (ref 1.7–8.2)
NEUTS SEG NFR BLD: 86 % (ref 43–78)
NRBC # BLD: 0 K/UL (ref 0–0.2)
PLATELET # BLD AUTO: 178 K/UL (ref 150–450)
PMV BLD AUTO: 10.8 FL (ref 9.4–12.3)
RBC # BLD AUTO: 4.04 M/UL (ref 4.05–5.2)
WBC # BLD AUTO: 12.9 K/UL (ref 4.3–11.1)

## 2020-01-24 PROCEDURE — 36415 COLL VENOUS BLD VENIPUNCTURE: CPT

## 2020-01-24 PROCEDURE — 74011250637 HC RX REV CODE- 250/637: Performed by: OBSTETRICS & GYNECOLOGY

## 2020-01-24 PROCEDURE — 74011250636 HC RX REV CODE- 250/636: Performed by: ANESTHESIOLOGY

## 2020-01-24 PROCEDURE — 85025 COMPLETE CBC W/AUTO DIFF WBC: CPT

## 2020-01-24 PROCEDURE — 74011250637 HC RX REV CODE- 250/637: Performed by: ANESTHESIOLOGY

## 2020-01-24 PROCEDURE — 65270000029 HC RM PRIVATE

## 2020-01-24 RX ORDER — DIPHENHYDRAMINE HCL 25 MG
25 CAPSULE ORAL
Status: DISCONTINUED | OUTPATIENT
Start: 2020-01-24 | End: 2020-01-26 | Stop reason: HOSPADM

## 2020-01-24 RX ORDER — IBUPROFEN 800 MG/1
800 TABLET ORAL
Status: DISCONTINUED | OUTPATIENT
Start: 2020-01-24 | End: 2020-01-26 | Stop reason: HOSPADM

## 2020-01-24 RX ORDER — NALOXONE HYDROCHLORIDE 0.4 MG/ML
0.4 INJECTION, SOLUTION INTRAMUSCULAR; INTRAVENOUS; SUBCUTANEOUS AS NEEDED
Status: DISCONTINUED | OUTPATIENT
Start: 2020-01-24 | End: 2020-01-26 | Stop reason: HOSPADM

## 2020-01-24 RX ORDER — OXYCODONE AND ACETAMINOPHEN 7.5; 325 MG/1; MG/1
1 TABLET ORAL
Status: DISCONTINUED | OUTPATIENT
Start: 2020-01-24 | End: 2020-01-26 | Stop reason: HOSPADM

## 2020-01-24 RX ORDER — SODIUM CHLORIDE 0.9 % (FLUSH) 0.9 %
5-40 SYRINGE (ML) INJECTION AS NEEDED
Status: DISCONTINUED | OUTPATIENT
Start: 2020-01-24 | End: 2020-01-26 | Stop reason: HOSPADM

## 2020-01-24 RX ORDER — SODIUM CHLORIDE 0.9 % (FLUSH) 0.9 %
5-40 SYRINGE (ML) INJECTION EVERY 8 HOURS
Status: DISCONTINUED | OUTPATIENT
Start: 2020-01-24 | End: 2020-01-26 | Stop reason: HOSPADM

## 2020-01-24 RX ORDER — ZOLPIDEM TARTRATE 5 MG/1
5 TABLET ORAL
Status: DISCONTINUED | OUTPATIENT
Start: 2020-01-24 | End: 2020-01-26 | Stop reason: HOSPADM

## 2020-01-24 RX ORDER — OXYCODONE AND ACETAMINOPHEN 7.5; 325 MG/1; MG/1
2 TABLET ORAL
Status: DISCONTINUED | OUTPATIENT
Start: 2020-01-24 | End: 2020-01-26 | Stop reason: HOSPADM

## 2020-01-24 RX ORDER — SODIUM CHLORIDE, SODIUM LACTATE, POTASSIUM CHLORIDE, CALCIUM CHLORIDE 600; 310; 30; 20 MG/100ML; MG/100ML; MG/100ML; MG/100ML
150 INJECTION, SOLUTION INTRAVENOUS CONTINUOUS
Status: ACTIVE | OUTPATIENT
Start: 2020-01-24 | End: 2020-01-25

## 2020-01-24 RX ADMIN — DOCUSATE SODIUM 100 MG: 100 CAPSULE, LIQUID FILLED ORAL at 10:47

## 2020-01-24 RX ADMIN — SIMETHICONE CHEW TAB 80 MG 80 MG: 80 TABLET ORAL at 03:54

## 2020-01-24 RX ADMIN — OXYCODONE HYDROCHLORIDE 10 MG: 5 TABLET ORAL at 03:54

## 2020-01-24 RX ADMIN — SIMETHICONE CHEW TAB 80 MG 80 MG: 80 TABLET ORAL at 17:12

## 2020-01-24 RX ADMIN — IBUPROFEN 800 MG: 800 TABLET, FILM COATED ORAL at 17:11

## 2020-01-24 RX ADMIN — Medication 1 AMPULE: at 01:57

## 2020-01-24 RX ADMIN — OXYCODONE HYDROCHLORIDE 10 MG: 5 TABLET ORAL at 10:46

## 2020-01-24 RX ADMIN — OXYCODONE HYDROCHLORIDE AND ACETAMINOPHEN 1 TABLET: 7.5; 325 TABLET ORAL at 17:12

## 2020-01-24 RX ADMIN — SIMETHICONE CHEW TAB 80 MG 80 MG: 80 TABLET ORAL at 10:47

## 2020-01-24 RX ADMIN — Medication 1 AMPULE: at 10:47

## 2020-01-24 RX ADMIN — KETOROLAC TROMETHAMINE 15 MG: 30 INJECTION, SOLUTION INTRAMUSCULAR at 10:46

## 2020-01-24 RX ADMIN — DOCUSATE SODIUM 100 MG: 100 CAPSULE, LIQUID FILLED ORAL at 17:11

## 2020-01-24 RX ADMIN — KETOROLAC TROMETHAMINE 15 MG: 30 INJECTION, SOLUTION INTRAMUSCULAR at 01:57

## 2020-01-24 NOTE — PROGRESS NOTES
RN accompanies patient and FOB to SCN to visit with infant. RN remains with patient to assure all needs are met. Encouraged to call with needs.

## 2020-01-24 NOTE — PROGRESS NOTES
Post-Operative Day Number 1 Progress Note    Patient doing well post-op day 1 from  delivery without significant complaints. Pain controlled on current medication. Voiding without difficulty, normal lochia. Vitals:  Blood pressure 105/72, pulse 81, temperature 97.7 °F (36.5 °C), resp. rate 17, height 5' 5\" (1.651 m), weight 242 lb (109.8 kg), last menstrual period 2019, SpO2 99 %, unknown if currently breastfeeding. Vital signs stable, afebrile. Exam:  Patient without distress. Abdomen soft, fundus firm at level of umbilicus, nontender. Incision dry and  clean without erythema. Lower extremities are negative for swelling, cords or tenderness. Labs: Hgb 12.7    Assessment and Plan:  Patient appears to be having uncomplicated post- course. Continue routine post-op care and maternal education.   Baby in NICU now

## 2020-01-24 NOTE — LACTATION NOTE

## 2020-01-24 NOTE — PROGRESS NOTES
SCDs removed, IV capped, Gottlieb removed. Patient tolerated well. OOB to bathroom. Gait steady. Shannan care taught. Clean pad and panties placed on perineum. Patient ambulates back to bed and sits at bedside. Dr. Lea Krishnamurthy at bedside assessing infant at this time.

## 2020-01-24 NOTE — LACTATION NOTE
This note was copied from a baby's chart. Lactation visit. Met with mom in her room briefly and again in SCN to assist with feeding. Mom has pumped exclusively in the past, this is first baby to do direct breastfeeding. Baby has latched fairly well several feeds so far. Better on right than left per mom. Mom attempting now in cross cradle hold. Poor technique and poor support given IV placement. Assisted mom in football hold. Showed mom how to support infant and bring onto breast along with breast compression to aide in achieving deep latch. [place] nipples. Baby was able to latch to right breast well, nursed x 15 minutes. Left nipple shorter and took a few tries for good latch. Baby more shallow on left but was able to stay on fairly well x 10 minutes. Once baby finished feeding, baby fussy. Noted very anterior attachment of lingual frenulum. Baby can extend tongue beyond gumline but tongue points downward and dimples deeply in center. Frenulum looks thick. Shown to parents. Baby also with webbed labial frenulum of upper gum that knotches into gumline with gap. Also shown to parents. Encouraged assessment by primary ped MD. Will need to watch latching and feeding success closely.  Lactation to continue to actively follow and assist.

## 2020-01-24 NOTE — LACTATION NOTE
This note was copied from a baby's chart. Mother called out stating that her infant has not nursed in a few hours and that she is concerned about her milk supply. Mother requesting a breast pump. Breast pump and supplies taken into patient's room. Pump set up. Mother educated on expected volume of colostrum at this time, how to use the pump, set the pump up, and how to wash the pump parts. Encouraged to mother to double pump for 15 minutes. After mother double pumped for 15 minutes, 1.5 ml of colostrum collected from the pump bottles. Infant fed mother's pumped colostrum via syringe. Mother encouraged to call out of assistance with next feeding if needed. Mother verbalized understanding. All questions answered.

## 2020-01-24 NOTE — PROGRESS NOTES
Chart reviewed - baby in NICU, hx of postpartum depression.  made introduction to family and provided informational packet on  mood disorder education/resources. Patient states that she experienced postpartum depression after the birth of her twins in 2017. Postpartum depression lasted for 6-7 months. Per patient, \"I have depression any ways. \"  Patient has never taken medication for this depression, but began seeing a counselor after she had the twins. She continues to see a counselor monthly and has found this helpful. Patient states that she has a strong, local support system. PCP is with [facility]. Family receptive to receiving information and denied any additional needs from . Family has 's contact information should any needs/questions arise.     KYAW Pathak-FRANNY  [facility]   [phone]

## 2020-01-24 NOTE — PROGRESS NOTES
Shift assessment complete as noted. Patient request pain medicine. Toradol given per slow IVP. Gottlieb draining, yellow urine. POC reviewed including catheter removal and plans to ambulate. Questions encouraged and answered. Encouraged to call for needs or concerns. Verbalizes understanding.

## 2020-01-24 NOTE — PROGRESS NOTES
Anesthesiology  Post-op Note    Post-op day 1 s/p  via spinal with neuraxial opioids for post-op pain management. Visit Vitals  /64 (BP 1 Location: Right arm, BP Patient Position: At rest)   Pulse 73   Temp 36.8 °C (98.2 °F)   Resp 17   Ht 5' 5\" (1.651 m)   Wt 109.8 kg (242 lb)   LMP 2019   SpO2 95%   Breastfeeding Unknown   BMI 40.27 kg/m²     Airway patent, patient appropriately hydrated and appears euvolemic. Patient is Alert and oriented. Pain is well controlled. Pruritus is well controlled. Nausea is well controlled. No complaints about back or site of injection. Motor and sensory function has returned to baseline in lower extremities. Patient is satisfied with anesthetic and reports no complications. Continue current orders, then initiate surgeon's orders for pain management 24 hours after . Follow up per surgeon.

## 2020-01-25 PROCEDURE — 74011250637 HC RX REV CODE- 250/637: Performed by: OBSTETRICS & GYNECOLOGY

## 2020-01-25 PROCEDURE — 65270000029 HC RM PRIVATE

## 2020-01-25 RX ADMIN — DOCUSATE SODIUM 100 MG: 100 CAPSULE, LIQUID FILLED ORAL at 08:37

## 2020-01-25 RX ADMIN — DOCUSATE SODIUM 100 MG: 100 CAPSULE, LIQUID FILLED ORAL at 22:21

## 2020-01-25 RX ADMIN — SIMETHICONE CHEW TAB 80 MG 80 MG: 80 TABLET ORAL at 15:05

## 2020-01-25 RX ADMIN — IBUPROFEN 800 MG: 800 TABLET, FILM COATED ORAL at 00:18

## 2020-01-25 RX ADMIN — SIMETHICONE CHEW TAB 80 MG 80 MG: 80 TABLET ORAL at 22:21

## 2020-01-25 RX ADMIN — IBUPROFEN 800 MG: 800 TABLET, FILM COATED ORAL at 05:49

## 2020-01-25 RX ADMIN — IBUPROFEN 800 MG: 800 TABLET, FILM COATED ORAL at 15:05

## 2020-01-25 RX ADMIN — SIMETHICONE CHEW TAB 80 MG 80 MG: 80 TABLET ORAL at 08:37

## 2020-01-25 RX ADMIN — VALACYCLOVIR HYDROCHLORIDE 500 MG: 500 TABLET, FILM COATED ORAL at 09:00

## 2020-01-25 RX ADMIN — OXYCODONE HYDROCHLORIDE AND ACETAMINOPHEN 1 TABLET: 7.5; 325 TABLET ORAL at 08:38

## 2020-01-25 RX ADMIN — OXYCODONE HYDROCHLORIDE AND ACETAMINOPHEN 1 TABLET: 7.5; 325 TABLET ORAL at 00:18

## 2020-01-25 RX ADMIN — OXYCODONE HYDROCHLORIDE AND ACETAMINOPHEN 1 TABLET: 7.5; 325 TABLET ORAL at 15:05

## 2020-01-25 RX ADMIN — OXYCODONE HYDROCHLORIDE AND ACETAMINOPHEN 1 TABLET: 7.5; 325 TABLET ORAL at 20:12

## 2020-01-25 RX ADMIN — IBUPROFEN 800 MG: 800 TABLET, FILM COATED ORAL at 22:21

## 2020-01-25 NOTE — PROGRESS NOTES
01/25/20 9145   Pain Assessment   Pain Scale 1 Numeric (0 - 10)   Pain Intensity 1 5   Pain Location 1 Incisional   Pain Description 1 Sore   Pain Intervention(s) 1 Medication (see MAR)   Motrin and percocet given.

## 2020-01-25 NOTE — PROGRESS NOTES
Post-Operative Day Number 2 Progress Note    Patient doing well post-op day 2 from  delivery without significant complaints. Pain controlled on current medication. Voiding without difficulty, normal lochia. Vitals:  Blood pressure 102/61, pulse 70, temperature 98.1 °F (36.7 °C), resp. rate 17, height 5' 5\" (1.651 m), weight 242 lb (109.8 kg), last menstrual period 2019, SpO2 95 %, unknown if currently breastfeeding. Vital signs stable, afebrile. Exam:  Patient without distress. Abdomen soft, fundus firm at level of umbilicus, nontender. Incision dry and   clean without erythema. Lower extremities are negative for swelling, cords or tenderness. Labs: No lab exists for component: HBG    Assessment and Plan:  Patient appears to be having uncomplicated post- course. Continue routine post-op care and maternal education.   Probably d/c in am, baby in NICU

## 2020-01-25 NOTE — LACTATION NOTE
This note was copied from a baby's chart. In to see mom and infant for follow up, now that infant out of SCN. Mom feeding baby when came in. Saw part of 20 minutes feed w/ infant on mom's right breast in football hold. Baby fed fair- well overall. Several audible swallows heard throughout. Mom's milk coming in. Mom getting engorged and uncomfortable. She plans to pump after this to help \"reset and sofen\" breasts so baby can latch easier in future feedings. After baby fed on right side, lactation showed mom how to finger feed back w/ curve tip syringe, her prior expressed breast milk from earlier so would not go to waste. Baby tolerated it well and took 8mls. Discussed w/ mom her doing \"some\" insurance pumping behind some daytime feeds just for next 1-2 days until can see if baby's lingual frenulum is impacting feeding at all and due to mom's hx of augmentation. Discussed delicate balance between stimulating too much as don't want to create oversupply, but also being careful of supply w/ some potential riskfactors. Mom getting start of plugged duct to right axilla region.  Encouraged heat and massage there while she pumps and tries to empty breast. Lactation to follow up in am.

## 2020-01-26 VITALS
HEIGHT: 65 IN | DIASTOLIC BLOOD PRESSURE: 79 MMHG | TEMPERATURE: 98.4 F | OXYGEN SATURATION: 95 % | WEIGHT: 242 LBS | BODY MASS INDEX: 40.32 KG/M2 | SYSTOLIC BLOOD PRESSURE: 112 MMHG | HEART RATE: 67 BPM | RESPIRATION RATE: 18 BRPM

## 2020-01-26 PROCEDURE — 74011250637 HC RX REV CODE- 250/637: Performed by: OBSTETRICS & GYNECOLOGY

## 2020-01-26 RX ORDER — IBUPROFEN 800 MG/1
800 TABLET ORAL
Qty: 40 TAB | Refills: 1 | Status: SHIPPED | OUTPATIENT
Start: 2020-01-26 | End: 2020-09-15

## 2020-01-26 RX ORDER — OXYCODONE AND ACETAMINOPHEN 7.5; 325 MG/1; MG/1
1 TABLET ORAL
Qty: 20 TAB | Refills: 0 | Status: SHIPPED | OUTPATIENT
Start: 2020-01-26 | End: 2020-01-31

## 2020-01-26 RX ADMIN — OXYCODONE HYDROCHLORIDE AND ACETAMINOPHEN 2 TABLET: 7.5; 325 TABLET ORAL at 11:34

## 2020-01-26 RX ADMIN — DOCUSATE SODIUM 100 MG: 100 CAPSULE, LIQUID FILLED ORAL at 11:34

## 2020-01-26 RX ADMIN — IBUPROFEN 800 MG: 800 TABLET, FILM COATED ORAL at 11:34

## 2020-01-26 RX ADMIN — IBUPROFEN 800 MG: 800 TABLET, FILM COATED ORAL at 04:28

## 2020-01-26 RX ADMIN — SIMETHICONE CHEW TAB 80 MG 80 MG: 80 TABLET ORAL at 11:30

## 2020-01-26 RX ADMIN — OXYCODONE HYDROCHLORIDE AND ACETAMINOPHEN 1 TABLET: 7.5; 325 TABLET ORAL at 04:28

## 2020-01-26 NOTE — DISCHARGE SUMMARY
[address] OB Discharge Summary     Patient ID:  Kathy Owusu  [id number]  29 y.o.  1985    Admit date: 2020    Discharge date and time: No discharge date for patient encounter. Admitting Physician: Estela Guzman MD     Discharge Physician: Josselin Blevins M.D. Admission Diagnoses: PROM (premature rupture of membranes) [O42.90]    Problem List: Hospital - Principal Problem:    PROM (premature rupture of membranes) (2020)    Active Problems:    History of  section complicating pregnancy (8638)      Overview: Secondary to twin pregnancy. Patient would like to discuss if she is a       candidate for . TOLAC consent and risk assessments completed and scanned into chart       19            See High Risk Pregnancy Overview      History of postpartum depression, currently pregnant (2019)      Overview: 2019 at [facility]:  Patient with history of PP Depression after last       pregnancy. See High Risk Pregnancy Overview     ; Other -   Patient Active Problem List   Diagnosis Code    History of  delivery, currently pregnant O1.18    History of loop electrosurgical excision procedure (LEEP) of cervix affecting pregnancy in third trimester O34.43, Z98.890    History of  section complicating pregnancy H77.461    History of postpartum depression, currently pregnant O99.89, Z86.59    Genital herpes affecting pregnancy O98.319, A60.09    Hypothyroidism affecting pregnancy in third trimester O99.283, E03.9    High-risk pregnancy in third trimester O09.93    Obesity complicating pregnancy in third trimester O99.213    Encounter for immunization Z23    False labor after 37 weeks of gestation without delivery O47.1    PROM (premature rupture of membranes) O42.90        Discharge Diagnoses: PROM (premature rupture of membranes) [O42.90]    Hospital Course: Jacqueline Mars had unremarkeable progressive recovery.   and Eating, ambulating, and voiding in a routine manner. Significant Diagnostic Studies: No results found for this or any previous visit (from the past 24 hour(s)). Procedures: repeat  section, low transverse incision    Discharge Exam:  Visit Vitals  /79 (BP 1 Location: Left arm, BP Patient Position: At rest)   Pulse 67   Temp 98.4 °F (36.9 °C)   Resp 18   Ht 5' 5\" (1.651 m)   Wt 242 lb (109.8 kg)   LMP 2019   SpO2 95%   Breastfeeding Unknown   BMI 40.27 kg/m²        Heart: regular rate and rhythm, S1, S2 normal, no murmur, click, rub or gallop  Lungs:clear to auscultation bilaterally  Extremities: normal, atraumatic, no cyanosis or edema. No DVT  Incision/episiotomy: no significant drainage, no dehiscence, no significant erythema    Patient Instructions:   Current Discharge Medication List      START taking these medications    Details   ibuprofen (MOTRIN) 800 mg tablet Take 1 Tab by mouth every eight (8) hours as needed for Pain. Indications: pain  Qty: 40 Tab, Refills: 1      oxyCODONE-acetaminophen (PERCOCET 7.5) 7.5-325 mg per tablet Take 1 Tab by mouth every six (6) hours as needed for Pain for up to 5 days. Max Daily Amount: 4 Tabs. Indications: pain  Qty: 20 Tab, Refills: 0    Associated Diagnoses: History of  section complicating pregnancy         CONTINUE these medications which have NOT CHANGED    Details   valACYclovir (VALTREX) 500 mg tablet Take 1 Tab by mouth two (2) times a day. Qty: 60 Tab, Refills: 3    Associated Diagnoses: Genital herpes affecting pregnancy in third trimester      levothyroxine (SYNTHROID) 175 mcg tablet 150 mcg. ascorbic acid, vitamin C, (VITAMIN C) 500 mg tablet Take  by mouth. folic acid 245 mcg tablet Take 800 mcg by mouth daily. cholecalciferol (VITAMIN D3) 1,000 unit cap Take  by mouth daily. PNV COMBO#47/IRON/FA #1/DHA (PNV-DHA PO) Take  by mouth. VITAMIN B COMPLEX NO.12-NIACIN PO Take  by mouth.       CALCIUM PO Take  by mouth.      FERROUS SULFATE (IRON PO) Take  by mouth. Activity: physical activity is restricted per discharge instructions  Diet: resume normal diet  Wound Care: Keep wound clean and dry, as directed    Follow-up with Natalie in 2 weeks.     Signed:  Kenia Crespo MD  1/26/2020  10:13 AM

## 2020-01-26 NOTE — LACTATION NOTE
This note was copied from a baby's chart. Individualized Feeding Plan for Breastfeeding   Lactation Services [phone]      As much as possible, hold your baby on your chest so babys bare skin is against your bare skin with a blanket covering babys back, especially 30 minutes before it is time for baby to eat. Watch for early feeding cues such as, licking lips, sucking motions, rooting, hands to mouth. Crying is a late feeding cue. Feed your baby at least 8 times in 24 hours, or more if your baby is showing feeding cues. If baby is sleepy put baby skin to skin and watch for hunger cues. To rouse baby: unwrap, undress, massage hands, feet, & back, change diaper, gently change babys position from lying to sitting. 15-20 minutes on the first breast of active breastfeeding is considered a good feeding. Good, active breastfeeding is when baby is alert, tugging the nipple, their ear may move, and you can hear swallows. Allow baby to finish the first side before changing sides. Sleeping at the breast or only brief, light sucks should not be considered a good, full breastfeed. At each feeding:  __x__1. Do Suck Practice on finger before each feeding until sucking pattern is smooth. Try using index finger. Nail down towards tongue. __x__2. Hand Express for a few minutes prior to latching to help start milk flow. __x__3. Baby needs to NURSE WELL x 15-20 minutes on at least first breast, burp and offer 2nd breast at every feeding. If no sustained latch only attempt at breast for 10 minutes. If baby does not latch on and feed well on at least one side, you should:   __x__4. Double pump for 15 minutes with breast massage and compression. Hand express for an additional 2-3 minutes per side. Pump after each feeding attempt or poor feeding, up to 8 times per day. If you are not putting baby to the breast you need to pump 8 times a day. Pump every 3 hours. __x__5.  Give baby all of the breast milk you obtain using a straight syringe or  curved syringe. If baby does NOT have enough wet and dirty diapers per day, is jaundiced/lethargic, or has significant weight loss AND you do NOT pump enough milk for each feeding (per volume listed below), formula supplementation may need to be used. Call lactation department /pediatrician if you have concerns. AVERAGE INTAKES OF COLOSTRUM BY HEALTHY  INFANTS:  Time  Day Intake (ml/feed)  Based on 8 feedings per day. 1st 24 hrs  1 2-10 ml  24-48 hrs  2 5-15 ml  48-72 hrs  3 15-30 ml (0.5-1 oz)  72-96 hrs  4 30-45 ml (1-1.5oz)                          5-6      45-60 ml (1.5-2oz)                           7        60-75 ml (2-2.5oz)    By day 7, baby will need 70 ml or 2.25 oz at each feeding based on 8 feedings per day & babys weight. (1oz = 30ml). Total milk volume needed in 24 hours by Day 7 is 18 oz per day based on baby's birthweight of 6lb 10oz. The more often baby eats, the less volume they need per feeding. If baby is eating more often than the minimum of 8 times per day, they may take less per feeding. Comments:     Use feeding plan until follow up with pediatrician. Continue to attempt at the breast for most feeds. Pump every 3 hours if no latch. Give all pumped colostrum/breastmilk at each feeding. OUTPATIENT APPOINTMENT SCHEDULED FOR :     Outpatient services are located on the 4th floor at [place]. Check in at the 4th floor registration desk (the same one you used when you came to have your baby).   Call for questions [phone]

## 2020-01-26 NOTE — PROGRESS NOTES
Discharge teaching complete. Mother verbalized understanding, questions encouraged. [place] sheet signed.

## 2020-01-26 NOTE — DISCHARGE INSTRUCTIONS

## 2020-01-26 NOTE — LACTATION NOTE
In to see mom and infant for discharge. Mom states baby cluster fed at breast last night, she pumped a few times as well. Baby this am took 1 1/2 oz of pumped breast milk. Asleep next to mom at this time. Reviewed how to be extra careful of period of engorgement w/ breast augmentation. Mom has personal pump at home to use. Reviewed feeding plan for guidance at home as needed and encouraged outpatient follow up for any future latching/ feeding concerns as baby has a lingual frenulum we encouraged her to talk to Pediatrician about. Referral list given if needs revision. Mom does not want to schedule appt w/ our outpatient lactation at this time, wants to go home first and see how baby does. Reviewed discharge instructions. Encouraged 8-12 full feeds per day, closely monitor wet/dirty diapers.

## 2020-02-05 PROBLEM — E66.01 SEVERE OBESITY (HCC): Status: ACTIVE | Noted: 2020-02-05

## 2020-03-02 PROBLEM — O09.899 HISTORY OF PRETERM DELIVERY, CURRENTLY PREGNANT: Status: RESOLVED | Noted: 2019-06-26 | Resolved: 2020-03-02

## 2020-03-02 PROBLEM — E03.9 HYPOTHYROIDISM AFFECTING PREGNANCY IN THIRD TRIMESTER: Status: RESOLVED | Noted: 2019-06-26 | Resolved: 2020-03-02

## 2020-03-02 PROBLEM — O42.90 PROM (PREMATURE RUPTURE OF MEMBRANES): Status: RESOLVED | Noted: 2020-01-23 | Resolved: 2020-03-02

## 2020-03-02 PROBLEM — O99.213 OBESITY COMPLICATING PREGNANCY IN THIRD TRIMESTER: Status: RESOLVED | Noted: 2019-11-13 | Resolved: 2020-03-02

## 2020-03-02 PROBLEM — O47.1 FALSE LABOR AFTER 37 WEEKS OF GESTATION WITHOUT DELIVERY: Status: RESOLVED | Noted: 2020-01-22 | Resolved: 2020-03-02

## 2020-03-02 PROBLEM — Z98.890 HISTORY OF LOOP ELECTROSURGICAL EXCISION PROCEDURE (LEEP) OF CERVIX AFFECTING PREGNANCY IN THIRD TRIMESTER: Status: RESOLVED | Noted: 2019-06-26 | Resolved: 2020-03-02

## 2020-03-02 PROBLEM — A60.09 GENITAL HERPES AFFECTING PREGNANCY: Status: RESOLVED | Noted: 2019-06-26 | Resolved: 2020-03-02

## 2020-03-02 PROBLEM — O34.43 HISTORY OF LOOP ELECTROSURGICAL EXCISION PROCEDURE (LEEP) OF CERVIX AFFECTING PREGNANCY IN THIRD TRIMESTER: Status: RESOLVED | Noted: 2019-06-26 | Resolved: 2020-03-02

## 2020-03-02 PROBLEM — O99.283 HYPOTHYROIDISM AFFECTING PREGNANCY IN THIRD TRIMESTER: Status: RESOLVED | Noted: 2019-06-26 | Resolved: 2020-03-02

## 2020-03-02 PROBLEM — O34.219 HISTORY OF CESAREAN SECTION COMPLICATING PREGNANCY: Status: RESOLVED | Noted: 2019-06-26 | Resolved: 2020-03-02

## 2020-03-02 PROBLEM — O09.93 HIGH-RISK PREGNANCY IN THIRD TRIMESTER: Status: RESOLVED | Noted: 2019-07-25 | Resolved: 2020-03-02

## 2020-03-02 PROBLEM — O98.319 GENITAL HERPES AFFECTING PREGNANCY: Status: RESOLVED | Noted: 2019-06-26 | Resolved: 2020-03-02

## 2020-03-02 PROBLEM — O99.891 HISTORY OF POSTPARTUM DEPRESSION, CURRENTLY PREGNANT: Status: RESOLVED | Noted: 2019-06-26 | Resolved: 2020-03-02

## 2020-03-02 PROBLEM — Z86.59 HISTORY OF POSTPARTUM DEPRESSION, CURRENTLY PREGNANT: Status: RESOLVED | Noted: 2019-06-26 | Resolved: 2020-03-02

## 2021-11-24 NOTE — H&P
History and Physical      Vinny Mars:   Physician:  Kristopher Crook. Alt, DO    This 39 y.o. female presents today for pre-operative evaluation. History: This 39 y.o. Rigoberto Syed  patient has history of  Menorrhagia, aub, pelvic pain and endometriosis. Previous treatment includes: hormone therapy and laparoscopy. OB History        6    Para   4    Term   1       3    AB   2    Living   5       SAB   1    IAB   0    Ectopic   0    Molar   0    Multiple   1    Live Births   5                Past Medical History:   Diagnosis Date    Abnormal Pap smear 2011 & 2015    HPV +    Abnormal Papanicolaou smear of cervix  &      2004    Asthma     childhood    Celiac disease     Depression     no meds    Endometriosis     Female dyspareunia     Genital herpes     Herpes simplex without mention of complication     Type 2    High-risk pregnancy in second trimester 2019    Flu vaccine given 10/16/19  2019 at [facility]:  Normal NT, NB present. Genetic counseling done by physician. Pt declines testing. May lift up to 45# at work at this time; limit if concerns develop 2019 at [facility]:  Normal anatomy/fetal Echo. Normal CL at 4.3 cm. Hx of PTL and delivery at 35 weeks in 2 pregnancies prior to last Twin Pregnancy. 10/3/2019 at [facility]: Normal CL 3.7 cm, no funneling    History of hyperthyroidism 3/2014    treated w/radiation    Hypothyroidism     managed w/med    Infertility, female     Miscarriage     Pelvic pain in female 10/8/2015    Postpartum depression     with last pregnancy-twins     labor in third trimester without delivery 10/16/2017        has a past surgical history that includes hx breast augmentation (2008); hx leep procedure (); hx wisdom teeth extraction (2007); hx colposcopy; hx other surgical; hx pelvic laparoscopy (, , ); hx other surgical (2015); and hx  section (10/16/2017).     Current Outpatient Medications Medication Sig Dispense    phentermine (Adipex-P) 37.5 mg capsule Take 37.5 mg by mouth every morning.  levothyroxine (SYNTHROID) 175 mcg tablet Take 137 mcg by mouth daily.  valACYclovir (VALTREX) 500 mg tablet Take 1 Tablet by mouth two (2) times a day. (Patient not taking: Reported on 11/19/2021) 60 Tablet    ascorbic acid, vitamin C, (VITAMIN C) 500 mg tablet Take  by mouth. (Patient not taking: Reported on 93/12/9944)     folic acid 621 mcg tablet Take 800 mcg by mouth daily. (Patient not taking: Reported on 11/19/2021)     cholecalciferol (VITAMIN D3) 1,000 unit cap Take  by mouth daily. (Patient not taking: Reported on 11/19/2021)     VITAMIN B COMPLEX NO.12-NIACIN PO Take  by mouth. (Patient not taking: Reported on 11/19/2021)     CALCIUM PO Take  by mouth. (Patient not taking: Reported on 11/19/2021)     FERROUS SULFATE (IRON PO) Take  by mouth. No current facility-administered medications for this visit. Not on File. reports that she has never smoked. She has never used smokeless tobacco. She reports that she does not drink alcohol and does not use drugs. family history includes Alzheimer's Disease in her paternal grandmother; Breast Cancer in her mother, paternal aunt, and paternal grandmother; Cancer in her maternal grandfather; Depression in her mother; Heart Disease in her maternal uncle and paternal grandfather; Heart defect in her sister; Other in her father; Ovarian Cancer in her paternal grandmother; Prostate Cancer in her maternal uncle; Stroke in her maternal grandfather, maternal grandmother, and maternal uncle; Thyroid Disease in her maternal uncle and other family members. Physical Exam:    Vitals:    11/19/21 0833   BP: 118/74   Weight: 226 lb (102.5 kg)   Height: 5' 5\" (1.651 m)       Patient without distress.   Heart: Regular rate and rhythm   Lung: clear to auscultation throughout lung fields, no wheezes, no rales, no rhonchi and normal respiratory effort  Abdomen: soft, nontender  Lower Extremities:  - Edema No    Findings/Diagnosis:   Pre-Op Evaluation done today. abnormal uterine bleeding, chronic pelvic pain and endometriosis     Surgery to be Performed:  TL w/ BS  Surgery Date:  12-7-21  [facility]  Surgery Duration:  1.5 hour  Surgery Type:  Assistant Surgeon: The risks of surgery were discussed in detail, including anesthesia, hemorrhage, blood clots, infection, damage to other organs such as bowel, bladder, ureters. Also the possible need for catheter use at home after surgery. Time away from work and physical restrictions discussed. Also the possible need for catheter use at home after surgery. Pt understands that complications aren't anticipated but that if they should occur than corrective procedures would be performed as indicated including, but not limited to, need for: transfusion, antibiotics, and additional surgical procedures including modification/extension of incision (possible vertical incision), colostomy, reimplantation of ureters, need for prolonged catheter drainage should urologic injury occur & other procedures as indicated. Unanticipated reactions to anesthesia as well as the small possibility of death were all discussed. All of the patient's questions/concerns were answered. She was encouraged to call me if she has additional questions/concerns prior to surgery. Pt understands & states she wants to proceed w/surgery.

## 2021-11-30 ENCOUNTER — HOSPITAL ENCOUNTER (OUTPATIENT)
Dept: SURGERY | Age: 36
Discharge: HOME | End: 2021-11-30

## 2021-12-02 VITALS — BODY MASS INDEX: 37.56 KG/M2 | HEIGHT: 64 IN | WEIGHT: 220 LBS

## 2021-12-02 RX ORDER — LEVOTHYROXINE SODIUM 150 MCG
150 TABLET ORAL
COMMUNITY

## 2021-12-02 NOTE — PERIOP NOTES
Enhanced Recovery After GYN Surgery: non-diabetic patients    It is highly recommended you purchase and drink Ensure Enlive or Ensure Complete - one bottle twice daily for five days starting on 12/1/21 through 12/5/21. Ensure Enlive or Complete is the preferred formula over other Ensure formulas as it is the only one that contains CaHMB which helps maintain and rebuild muscle health. It is recommended that you continue drinking this for one month after surgery. The night before surgery on 12/6/21, drink 2 bottles of the Ensure Pre-Surgery drink. The morning of surgery on 12/7/21, drink one bottle of the Ensure Pre-Surgery drink while on your way to the hospital. Drink this over 5-10 minutes. Drink nothing else after drinking the pre-surgical drink the morning of surgery. Things to remember if admitted to the hospital overnight:    1. You will be given clear liquids to drink, advancing diet as tolerated    2. You will be up and moving around with assistance 2-4 hours after surgery. 3. You will be given regularly scheduled pain medications (NSAIDS, Tylenol, Gabapentin) with narcotics as needed. 4. You may be able to go home that night if the surgeon okays and you are up and eating and drinking. Otherwise, your discharge will be the following morning around lunch time. 5. Continue drinking Ensure Enlive or Complete for 5 days after surgery.

## 2021-12-02 NOTE — PERIOP NOTES
PLEASE CONTINUE TAKING ALL PRESCRIPTION MEDICATIONS UP TO THE DAY OF SURGERY UNLESS OTHERWISE DIRECTED BELOW. DISCONTINUE all vitamins, herbals, and supplements 7 days prior to surgery. DISCONTINUE Non-Steriodal Anti-Inflammatory (NSAIDS) such as Advil, Ibuprofen, Motrin, Aspirin, Naproxen, and Aleve FIVE days prior to surgery. Home Medications to take  the day of surgery (12/7/21)      Synthroid      Valtrex if needed            Home Medications   to Hold     Adipex=stop 5 days prior         Comments    Covid test 12/3/21 @ 2 2 [facility], [facility]    On the day before surgery (Monday-12/6/21)  please take Acetaminophen 1000mg in the morning and then again before bed. You may substitute for Tylenol 650 mg. Please do not bring home medications with you on the day of surgery unless otherwise directed by your nurse. If you are instructed to bring home medications, please give them to your nurse as they will be administered by the nursing staff. If you have any questions, please call Carl [phone]. A copy of this note was provided to the patient for reference.

## 2021-12-02 NOTE — PERIOP NOTES
Patient verified name and . Order for consent found in EHR and does NOT match case posting; patient verifies procedure. Notified Sydney in scheduling. Sydney to call surgeons office to clarify. Type 2 surgery, phone assessment complete. Orders received. Labs per surgeon: none needed. Labs per anesthesia protocol: HGB-coming to the outpatient lab. Instructed on where and when to come. T&S to be collected dos. EKG: not needed per anesthesia protocols. Stress test (21) and Echo (21) in EHR for reference if needed. Patient COVID test date 12/3/21; Patient aware of the appointment. [facility] [address] [place]. If appointment is needed-patient provided telephone number of [phone]. Patient answered medical/surgical history questions at their best of ability. All prior to admission medications documented in [facility]. Patient instructed to take the following medications the day of surgery according to anesthesia guidelines with a small sip of water: synthroid, valtrex if needed. On the day before surgery please take Acetaminophen 1000mg in the morning and then again before bed. You may substitute for Tylenol 650 mg. Hold all vitamins 7 days prior to surgery and NSAIDS 5 days prior to surgery. Prescription meds to hold: adipex.        Patient instructed on the following:    > Arrive at A Entrance, time of arrival to be called the day before by 1700  > NPO after midnight including gum, mints, and ice chips  > Responsible adult must drive patient to the hospital, stay during surgery, and patient will need supervision 24 hours after anesthesia  > Use Hibiclens soap in shower the night before surgery and on the morning of surgery  > All piercings must be removed prior to arrival.    > Leave all valuables (money and jewelry) at home but bring insurance card and ID on DOS.   > You may be required to pay a deductible or co-pay on the day of your procedure. You can pre-pay by calling [phone] if your surgery is at the [facility] or [phone] if your surgery is at the [facility]. > Do not wear make-up, nail polish, lotions, cologne, perfumes, powders, or oil on skin. Artificial nails are not permitted.

## 2021-12-03 ENCOUNTER — HOSPITAL ENCOUNTER (OUTPATIENT)
Dept: LAB | Age: 36
Discharge: HOME | End: 2021-12-03
Attending: OBSTETRICS & GYNECOLOGY
Payer: COMMERCIAL

## 2021-12-03 LAB — HGB BLD-MCNC: 14.4 G/DL (ref 11.7–15.4)

## 2021-12-03 PROCEDURE — 85018 HEMOGLOBIN: CPT

## 2021-12-03 PROCEDURE — 36415 COLL VENOUS BLD VENIPUNCTURE: CPT

## 2021-12-06 ENCOUNTER — ANESTHESIA EVENT (OUTPATIENT)
Dept: SURGERY | Age: 36
End: 2021-12-06
Payer: COMMERCIAL

## 2021-12-07 ENCOUNTER — ANESTHESIA (OUTPATIENT)
Dept: SURGERY | Age: 36
End: 2021-12-07
Payer: COMMERCIAL

## 2021-12-07 ENCOUNTER — HOSPITAL ENCOUNTER (OUTPATIENT)
Age: 36
Setting detail: OUTPATIENT SURGERY
Discharge: HOME | End: 2021-12-07
Attending: OBSTETRICS & GYNECOLOGY | Admitting: OBSTETRICS & GYNECOLOGY
Payer: COMMERCIAL

## 2021-12-07 VITALS
TEMPERATURE: 98.9 F | HEIGHT: 64 IN | BODY MASS INDEX: 37.73 KG/M2 | RESPIRATION RATE: 16 BRPM | WEIGHT: 221 LBS | OXYGEN SATURATION: 94 % | DIASTOLIC BLOOD PRESSURE: 68 MMHG | HEART RATE: 62 BPM | SYSTOLIC BLOOD PRESSURE: 112 MMHG

## 2021-12-07 DIAGNOSIS — N92.0 MENORRHAGIA WITH REGULAR CYCLE: ICD-10-CM

## 2021-12-07 LAB
ABO + RH BLD: NORMAL
BLOOD GROUP ANTIBODIES SERPL: NORMAL
HCG UR QL: NEGATIVE
SPECIMEN EXP DATE BLD: NORMAL

## 2021-12-07 PROCEDURE — 77030035236 HC SUT PDS STRATFX BARB J&J -B: Performed by: OBSTETRICS & GYNECOLOGY

## 2021-12-07 PROCEDURE — 77030040361 HC SLV COMPR DVT MDII -B: Performed by: OBSTETRICS & GYNECOLOGY

## 2021-12-07 PROCEDURE — 74011250637 HC RX REV CODE- 250/637: Performed by: STUDENT IN AN ORGANIZED HEALTH CARE EDUCATION/TRAINING PROGRAM

## 2021-12-07 PROCEDURE — 77030018778 HC MANIP UTER VCAR CNMD -B: Performed by: OBSTETRICS & GYNECOLOGY

## 2021-12-07 PROCEDURE — 58571 TLH W/T/O 250 G OR LESS: CPT | Performed by: OBSTETRICS & GYNECOLOGY

## 2021-12-07 PROCEDURE — 74011250636 HC RX REV CODE- 250/636: Performed by: STUDENT IN AN ORGANIZED HEALTH CARE EDUCATION/TRAINING PROGRAM

## 2021-12-07 PROCEDURE — 77030010509 HC AIRWY LMA MSK TELE -A: Performed by: STUDENT IN AN ORGANIZED HEALTH CARE EDUCATION/TRAINING PROGRAM

## 2021-12-07 PROCEDURE — 77030040922 HC BLNKT HYPOTHRM STRY -A: Performed by: STUDENT IN AN ORGANIZED HEALTH CARE EDUCATION/TRAINING PROGRAM

## 2021-12-07 PROCEDURE — 74011250636 HC RX REV CODE- 250/636: Performed by: NURSE ANESTHETIST, CERTIFIED REGISTERED

## 2021-12-07 PROCEDURE — 88307 TISSUE EXAM BY PATHOLOGIST: CPT

## 2021-12-07 PROCEDURE — 77030008606 HC TRCR ENDOSC KII AMR -B: Performed by: OBSTETRICS & GYNECOLOGY

## 2021-12-07 PROCEDURE — 74011000250 HC RX REV CODE- 250: Performed by: NURSE ANESTHETIST, CERTIFIED REGISTERED

## 2021-12-07 PROCEDURE — 76210000000 HC OR PH I REC 2 TO 2.5 HR: Performed by: OBSTETRICS & GYNECOLOGY

## 2021-12-07 PROCEDURE — 77030037088 HC TUBE ENDOTRACH ORAL NSL COVD-A: Performed by: STUDENT IN AN ORGANIZED HEALTH CARE EDUCATION/TRAINING PROGRAM

## 2021-12-07 PROCEDURE — 81025 URINE PREGNANCY TEST: CPT

## 2021-12-07 PROCEDURE — 76010000162 HC OR TIME 1.5 TO 2 HR INTENSV-TIER 1: Performed by: OBSTETRICS & GYNECOLOGY

## 2021-12-07 PROCEDURE — 74011250636 HC RX REV CODE- 250/636: Performed by: OBSTETRICS & GYNECOLOGY

## 2021-12-07 PROCEDURE — 2709999900 HC NON-CHARGEABLE SUPPLY: Performed by: OBSTETRICS & GYNECOLOGY

## 2021-12-07 PROCEDURE — 77030040830 HC CATH URETH FOL MDII -A: Performed by: OBSTETRICS & GYNECOLOGY

## 2021-12-07 PROCEDURE — 76210000020 HC REC RM PH II FIRST 0.5 HR: Performed by: OBSTETRICS & GYNECOLOGY

## 2021-12-07 PROCEDURE — 77030012770 HC TRCR OPT FX AMR -B: Performed by: OBSTETRICS & GYNECOLOGY

## 2021-12-07 PROCEDURE — 74011000250 HC RX REV CODE- 250: Performed by: STUDENT IN AN ORGANIZED HEALTH CARE EDUCATION/TRAINING PROGRAM

## 2021-12-07 PROCEDURE — 77030031139 HC SUT VCRL2 J&J -A: Performed by: OBSTETRICS & GYNECOLOGY

## 2021-12-07 PROCEDURE — 77030003578 HC NDL INSUF VERES AMR -B: Performed by: OBSTETRICS & GYNECOLOGY

## 2021-12-07 PROCEDURE — 77030031492 HC PRT ACC BLNT AIRSEAL CNMD -B: Performed by: OBSTETRICS & GYNECOLOGY

## 2021-12-07 PROCEDURE — 77030018836 HC SOL IRR NACL ICUM -A: Performed by: OBSTETRICS & GYNECOLOGY

## 2021-12-07 PROCEDURE — 86901 BLOOD TYPING SEROLOGIC RH(D): CPT

## 2021-12-07 PROCEDURE — 77030010507 HC ADH SKN DERMBND J&J -B: Performed by: OBSTETRICS & GYNECOLOGY

## 2021-12-07 PROCEDURE — 77030033188 HC TBNG FLTRD BIIFUR DISP CNMD -C: Performed by: OBSTETRICS & GYNECOLOGY

## 2021-12-07 PROCEDURE — 76060000034 HC ANESTHESIA 1.5 TO 2 HR: Performed by: OBSTETRICS & GYNECOLOGY

## 2021-12-07 RX ORDER — KETAMINE HYDROCHLORIDE 50 MG/ML
INJECTION, SOLUTION INTRAMUSCULAR; INTRAVENOUS AS NEEDED
Status: DISCONTINUED | OUTPATIENT
Start: 2021-12-07 | End: 2021-12-07 | Stop reason: HOSPADM

## 2021-12-07 RX ORDER — LIDOCAINE HYDROCHLORIDE 20 MG/ML
INJECTION, SOLUTION EPIDURAL; INFILTRATION; INTRACAUDAL; PERINEURAL AS NEEDED
Status: DISCONTINUED | OUTPATIENT
Start: 2021-12-07 | End: 2021-12-07 | Stop reason: HOSPADM

## 2021-12-07 RX ORDER — ONDANSETRON 2 MG/ML
INJECTION INTRAMUSCULAR; INTRAVENOUS AS NEEDED
Status: DISCONTINUED | OUTPATIENT
Start: 2021-12-07 | End: 2021-12-07 | Stop reason: HOSPADM

## 2021-12-07 RX ORDER — ACETAMINOPHEN 500 MG
1000 TABLET ORAL ONCE
Status: COMPLETED | OUTPATIENT
Start: 2021-12-07 | End: 2021-12-07

## 2021-12-07 RX ORDER — NALOXONE HYDROCHLORIDE 0.4 MG/ML
0.1 INJECTION, SOLUTION INTRAMUSCULAR; INTRAVENOUS; SUBCUTANEOUS AS NEEDED
Status: DISCONTINUED | OUTPATIENT
Start: 2021-12-07 | End: 2021-12-07 | Stop reason: HOSPADM

## 2021-12-07 RX ORDER — SODIUM CHLORIDE 0.9 % (FLUSH) 0.9 %
5-40 SYRINGE (ML) INJECTION AS NEEDED
Status: DISCONTINUED | OUTPATIENT
Start: 2021-12-07 | End: 2021-12-07 | Stop reason: HOSPADM

## 2021-12-07 RX ORDER — OXYCODONE HYDROCHLORIDE 5 MG/1
5-10 TABLET ORAL
Qty: 25 TABLET | Refills: 0 | Status: SHIPPED | OUTPATIENT
Start: 2021-12-07 | End: 2021-12-10

## 2021-12-07 RX ORDER — CELECOXIB 100 MG/1
100 CAPSULE ORAL 2 TIMES DAILY
Status: CANCELLED | OUTPATIENT
Start: 2021-12-08

## 2021-12-07 RX ORDER — FENTANYL CITRATE 50 UG/ML
INJECTION, SOLUTION INTRAMUSCULAR; INTRAVENOUS AS NEEDED
Status: DISCONTINUED | OUTPATIENT
Start: 2021-12-07 | End: 2021-12-07 | Stop reason: HOSPADM

## 2021-12-07 RX ORDER — SODIUM CHLORIDE 0.9 % (FLUSH) 0.9 %
5-40 SYRINGE (ML) INJECTION EVERY 8 HOURS
Status: DISCONTINUED | OUTPATIENT
Start: 2021-12-07 | End: 2021-12-07 | Stop reason: HOSPADM

## 2021-12-07 RX ORDER — OXYCODONE HYDROCHLORIDE 5 MG/1
5-10 TABLET ORAL
Status: CANCELLED | OUTPATIENT
Start: 2021-12-07

## 2021-12-07 RX ORDER — GABAPENTIN 300 MG/1
300 CAPSULE ORAL 3 TIMES DAILY
Status: CANCELLED | OUTPATIENT
Start: 2021-12-07

## 2021-12-07 RX ORDER — FLUMAZENIL 0.1 MG/ML
0.2 INJECTION INTRAVENOUS
Status: DISCONTINUED | OUTPATIENT
Start: 2021-12-07 | End: 2021-12-07 | Stop reason: HOSPADM

## 2021-12-07 RX ORDER — CEFAZOLIN SODIUM/WATER 2 G/20 ML
2 SYRINGE (ML) INTRAVENOUS ONCE
Status: COMPLETED | OUTPATIENT
Start: 2021-12-07 | End: 2021-12-07

## 2021-12-07 RX ORDER — IBUPROFEN 800 MG/1
800 TABLET ORAL
Qty: 30 TABLET | Refills: 0 | Status: SHIPPED | OUTPATIENT
Start: 2021-12-07 | End: 2022-04-25 | Stop reason: ALTCHOICE

## 2021-12-07 RX ORDER — PROPOFOL 10 MG/ML
INJECTION, EMULSION INTRAVENOUS AS NEEDED
Status: DISCONTINUED | OUTPATIENT
Start: 2021-12-07 | End: 2021-12-07 | Stop reason: HOSPADM

## 2021-12-07 RX ORDER — ACETAMINOPHEN 500 MG
1000 TABLET ORAL EVERY 6 HOURS
Status: CANCELLED | OUTPATIENT
Start: 2021-12-07

## 2021-12-07 RX ORDER — ACETAMINOPHEN 500 MG
1000 TABLET ORAL
Qty: 30 TABLET | Refills: 0 | Status: SHIPPED | OUTPATIENT
Start: 2021-12-07 | End: 2022-04-25 | Stop reason: ALTCHOICE

## 2021-12-07 RX ORDER — LIDOCAINE HYDROCHLORIDE 10 MG/ML
0.1 INJECTION INFILTRATION; PERINEURAL AS NEEDED
Status: DISCONTINUED | OUTPATIENT
Start: 2021-12-07 | End: 2021-12-07 | Stop reason: HOSPADM

## 2021-12-07 RX ORDER — NEOSTIGMINE METHYLSULFATE 1 MG/ML
INJECTION, SOLUTION INTRAVENOUS AS NEEDED
Status: DISCONTINUED | OUTPATIENT
Start: 2021-12-07 | End: 2021-12-07 | Stop reason: HOSPADM

## 2021-12-07 RX ORDER — APREPITANT 40 MG/1
40 CAPSULE ORAL ONCE
Status: COMPLETED | OUTPATIENT
Start: 2021-12-07 | End: 2021-12-07

## 2021-12-07 RX ORDER — DIPHENHYDRAMINE HYDROCHLORIDE 50 MG/ML
12.5 INJECTION, SOLUTION INTRAMUSCULAR; INTRAVENOUS
Status: DISCONTINUED | OUTPATIENT
Start: 2021-12-07 | End: 2021-12-07 | Stop reason: HOSPADM

## 2021-12-07 RX ORDER — HYDROMORPHONE HYDROCHLORIDE 2 MG/ML
0.5 INJECTION, SOLUTION INTRAMUSCULAR; INTRAVENOUS; SUBCUTANEOUS
Status: DISCONTINUED | OUTPATIENT
Start: 2021-12-07 | End: 2021-12-07 | Stop reason: HOSPADM

## 2021-12-07 RX ORDER — DEXAMETHASONE SODIUM PHOSPHATE 4 MG/ML
INJECTION, SOLUTION INTRA-ARTICULAR; INTRALESIONAL; INTRAMUSCULAR; INTRAVENOUS; SOFT TISSUE AS NEEDED
Status: DISCONTINUED | OUTPATIENT
Start: 2021-12-07 | End: 2021-12-07 | Stop reason: HOSPADM

## 2021-12-07 RX ORDER — GLYCOPYRROLATE 0.2 MG/ML
INJECTION INTRAMUSCULAR; INTRAVENOUS AS NEEDED
Status: DISCONTINUED | OUTPATIENT
Start: 2021-12-07 | End: 2021-12-07 | Stop reason: HOSPADM

## 2021-12-07 RX ORDER — MIDAZOLAM HYDROCHLORIDE 1 MG/ML
2 INJECTION, SOLUTION INTRAMUSCULAR; INTRAVENOUS
Status: COMPLETED | OUTPATIENT
Start: 2021-12-07 | End: 2021-12-07

## 2021-12-07 RX ORDER — KETOROLAC TROMETHAMINE 30 MG/ML
30 INJECTION, SOLUTION INTRAMUSCULAR; INTRAVENOUS EVERY 6 HOURS
Status: CANCELLED | OUTPATIENT
Start: 2021-12-07 | End: 2021-12-08

## 2021-12-07 RX ORDER — KETOROLAC TROMETHAMINE 30 MG/ML
INJECTION, SOLUTION INTRAMUSCULAR; INTRAVENOUS AS NEEDED
Status: DISCONTINUED | OUTPATIENT
Start: 2021-12-07 | End: 2021-12-07 | Stop reason: HOSPADM

## 2021-12-07 RX ORDER — ONDANSETRON 4 MG/1
4 TABLET, ORALLY DISINTEGRATING ORAL
Status: CANCELLED | OUTPATIENT
Start: 2021-12-07

## 2021-12-07 RX ORDER — SODIUM CHLORIDE, SODIUM LACTATE, POTASSIUM CHLORIDE, CALCIUM CHLORIDE 600; 310; 30; 20 MG/100ML; MG/100ML; MG/100ML; MG/100ML
150 INJECTION, SOLUTION INTRAVENOUS CONTINUOUS
Status: CANCELLED | OUTPATIENT
Start: 2021-12-07 | End: 2021-12-08

## 2021-12-07 RX ORDER — NALOXONE HYDROCHLORIDE 0.4 MG/ML
0.4 INJECTION, SOLUTION INTRAMUSCULAR; INTRAVENOUS; SUBCUTANEOUS AS NEEDED
Status: CANCELLED | OUTPATIENT
Start: 2021-12-07

## 2021-12-07 RX ORDER — SODIUM CHLORIDE, SODIUM LACTATE, POTASSIUM CHLORIDE, CALCIUM CHLORIDE 600; 310; 30; 20 MG/100ML; MG/100ML; MG/100ML; MG/100ML
100 INJECTION, SOLUTION INTRAVENOUS CONTINUOUS
Status: DISCONTINUED | OUTPATIENT
Start: 2021-12-07 | End: 2021-12-07 | Stop reason: HOSPADM

## 2021-12-07 RX ORDER — ROCURONIUM BROMIDE 10 MG/ML
INJECTION, SOLUTION INTRAVENOUS AS NEEDED
Status: DISCONTINUED | OUTPATIENT
Start: 2021-12-07 | End: 2021-12-07 | Stop reason: HOSPADM

## 2021-12-07 RX ORDER — OXYCODONE HYDROCHLORIDE 5 MG/1
5 TABLET ORAL
Status: DISCONTINUED | OUTPATIENT
Start: 2021-12-07 | End: 2021-12-07 | Stop reason: HOSPADM

## 2021-12-07 RX ADMIN — KETOROLAC TROMETHAMINE 30 MG: 30 INJECTION, SOLUTION INTRAMUSCULAR; INTRAVENOUS at 08:54

## 2021-12-07 RX ADMIN — ONDANSETRON 4 MG: 2 INJECTION INTRAMUSCULAR; INTRAVENOUS at 08:54

## 2021-12-07 RX ADMIN — KETAMINE HYDROCHLORIDE 15 MG: 50 INJECTION, SOLUTION INTRAMUSCULAR; INTRAVENOUS at 08:28

## 2021-12-07 RX ADMIN — GLYCOPYRROLATE 0.4 MG: 0.2 INJECTION, SOLUTION INTRAMUSCULAR; INTRAVENOUS at 08:59

## 2021-12-07 RX ADMIN — DEXAMETHASONE SODIUM PHOSPHATE 10 MG: 4 INJECTION, SOLUTION INTRAMUSCULAR; INTRAVENOUS at 08:54

## 2021-12-07 RX ADMIN — ROCURONIUM BROMIDE 10 MG: 10 INJECTION, SOLUTION INTRAVENOUS at 08:00

## 2021-12-07 RX ADMIN — HYDROMORPHONE HYDROCHLORIDE 0.5 MG: 2 INJECTION, SOLUTION INTRAMUSCULAR; INTRAVENOUS; SUBCUTANEOUS at 09:50

## 2021-12-07 RX ADMIN — ROCURONIUM BROMIDE 10 MG: 10 INJECTION, SOLUTION INTRAVENOUS at 08:28

## 2021-12-07 RX ADMIN — Medication 3 MG: at 08:59

## 2021-12-07 RX ADMIN — HYDROMORPHONE HYDROCHLORIDE 0.5 MG: 2 INJECTION, SOLUTION INTRAMUSCULAR; INTRAVENOUS; SUBCUTANEOUS at 09:29

## 2021-12-07 RX ADMIN — SODIUM CHLORIDE, SODIUM LACTATE, POTASSIUM CHLORIDE, AND CALCIUM CHLORIDE: 600; 310; 30; 20 INJECTION, SOLUTION INTRAVENOUS at 08:40

## 2021-12-07 RX ADMIN — HYDROMORPHONE HYDROCHLORIDE 0.5 MG: 2 INJECTION, SOLUTION INTRAMUSCULAR; INTRAVENOUS; SUBCUTANEOUS at 09:44

## 2021-12-07 RX ADMIN — CEFAZOLIN 2 G: 1 INJECTION, POWDER, FOR SOLUTION INTRAVENOUS at 07:20

## 2021-12-07 RX ADMIN — SODIUM CHLORIDE, SODIUM LACTATE, POTASSIUM CHLORIDE, AND CALCIUM CHLORIDE 100 ML/HR: 600; 310; 30; 20 INJECTION, SOLUTION INTRAVENOUS at 06:48

## 2021-12-07 RX ADMIN — KETAMINE HYDROCHLORIDE 30 MG: 50 INJECTION, SOLUTION INTRAMUSCULAR; INTRAVENOUS at 07:30

## 2021-12-07 RX ADMIN — FENTANYL CITRATE 100 MCG: 50 INJECTION INTRAMUSCULAR; INTRAVENOUS at 07:30

## 2021-12-07 RX ADMIN — ROCURONIUM BROMIDE 50 MG: 10 INJECTION, SOLUTION INTRAVENOUS at 07:30

## 2021-12-07 RX ADMIN — APREPITANT 40 MG: 40 CAPSULE ORAL at 06:39

## 2021-12-07 RX ADMIN — ACETAMINOPHEN 1000 MG: 500 TABLET, FILM COATED ORAL at 06:38

## 2021-12-07 RX ADMIN — LIDOCAINE HYDROCHLORIDE 100 MG: 20 INJECTION, SOLUTION EPIDURAL; INFILTRATION; INTRACAUDAL; PERINEURAL at 07:30

## 2021-12-07 RX ADMIN — MIDAZOLAM 2 MG: 1 INJECTION INTRAMUSCULAR; INTRAVENOUS at 06:47

## 2021-12-07 RX ADMIN — PROMETHAZINE HYDROCHLORIDE 6.25 MG: 25 INJECTION INTRAMUSCULAR; INTRAVENOUS at 10:57

## 2021-12-07 RX ADMIN — PROPOFOL 200 MG: 10 INJECTION, EMULSION INTRAVENOUS at 07:30

## 2021-12-07 RX ADMIN — HYDROMORPHONE HYDROCHLORIDE 0.5 MG: 2 INJECTION, SOLUTION INTRAMUSCULAR; INTRAVENOUS; SUBCUTANEOUS at 09:39

## 2021-12-07 NOTE — DISCHARGE INSTRUCTIONS
Patient Education        Laparoscopically Assisted Hysterectomy: What to Expect at Home  Your Recovery     Laparoscopically assisted hysterectomy (LAVH) removes the uterus through the vagina. Your doctor used a lighted tube and surgical tools inserted through small cuts (incisions) in the belly. Then the doctor made a small cut in the vagina. Your uterus was taken out through this cut. You can expect to feel better and stronger each day. But you might need pain medicine for a week or two. You may get tired easily or have less energy than usual. This may last for several weeks after surgery. You will probably notice that your belly is swollen and puffy. This is common. The swelling will take several weeks to go down. It may take about 4 to 6 weeks to fully recover. The recovery time may be shorter for some people. You may have some light vaginal bleeding. Don't have sex until the doctor says it's okay. Don't douche or put anything into your vagina, such as a tampon, until your doctor says it's okay. It's important to avoid lifting while you are recovering so that you can heal.  This care sheet gives you a general idea about how long it will take for you to recover. But each person recovers at a different pace. Follow the steps below to get better as quickly as possible. How can you care for yourself at home? Activity    · Rest when you feel tired. Getting enough sleep will help you recover.     · Try to walk each day. Start by walking a little more than you did the day before. Bit by bit, increase the amount you walk. Walking boosts blood flow and helps prevent pneumonia and constipation.     · Avoid lifting anything that would make you strain.  This may include heavy grocery bags and milk containers, a heavy briefcase or backpack, cat litter or dog food bags, a vacuum , or a child.     · Avoid strenuous activities, such as biking, jogging, weight lifting, or aerobic exercise, until your doctor says it is okay.     · You may shower. Pat the cut (incision) dry. Do not take a bath for the first 2 weeks, or until your doctor tells you it is okay.     · Ask your doctor when you can drive again.     · You will probably need to take about 2 weeks off from work. It depends on the type of work you do and how you feel.     · Your doctor will tell you when you can have sex again. Diet    · You can eat your normal diet. If your stomach is upset, try bland, low-fat foods like plain rice, broiled chicken, toast, and yogurt.     · Drink plenty of fluids (unless your doctor tells you not to).     · You may notice that your bowel movements are not regular right after your surgery. This is common. Try to avoid constipation and straining with bowel movements. You may want to take a fiber supplement every day. If you have not had a bowel movement after a couple of days, ask your doctor about taking a mild laxative. Medicines    · Your doctor will tell you if and when you can restart your medicines. He or she will also give you instructions about taking any new medicines.     · If you take aspirin or some other blood thinner, ask your doctor if and when to start taking it again. Make sure that you understand exactly what your doctor wants you to do.     · Be safe with medicines. Take pain medicines exactly as directed. ? If the doctor gave you a prescription medicine for pain, take it as prescribed. ? If you are not taking a prescription pain medicine, ask your doctor if you can take an over-the-counter medicine.     · If you think your pain medicine is making you sick to your stomach:  ? Take your medicine after meals (unless your doctor has told you not to). ? Ask your doctor for a different pain medicine.     · If your doctor prescribed antibiotics, take them as directed. Do not stop taking them just because you feel better. You need to take the full course of antibiotics.    Incision care    · You may have stitches over the cuts (incisions) the doctor made in your belly. If you have strips of tape on the incisions the doctor made, leave the tape on for a week or until it falls off. Or follow your doctor's instructions for removing the tape.     · Wash the area daily with warm, soapy water, and pat it dry. Don't use hydrogen peroxide or alcohol, which can slow healing. You may cover the area with a gauze bandage if it weeps or rubs against clothing. Change the bandage every day.     · Keep the area clean and dry. Other instructions    · You may have some light vaginal bleeding. Wear sanitary pads if needed. Do not douche or use tampons. Follow-up care is a key part of your treatment and safety. Be sure to make and go to all appointments, and call your doctor if you are having problems. It's also a good idea to know your test results and keep a list of the medicines you take. When should you call for help? Call 911 anytime you think you may need emergency care. For example, call if:    · You passed out (lost consciousness).     · You have chest pain, are short of breath, or cough up blood. Call your doctor now or seek immediate medical care if:    · You have pain that does not get better after you take pain medicine.     · You cannot pass stools or gas.     · You have vaginal discharge that has increased in amount or smells bad.     · You are sick to your stomach or cannot drink fluids.     · You have loose stitches, or your incision comes open.     · Bright red blood has soaked through the bandage over your incision.     · You have signs of infection, such as:  ? Increased pain, swelling, warmth, or redness. ? Red streaks leading from the incision. ? Pus draining from the incision. ?  A fever.     · You have bright red vaginal bleeding that soaks one or more pads in an hour, or you have large clots.     · You have signs of a blood clot in your leg (called a deep vein thrombosis), such as:  ? Pain in your calf, back of the knee, thigh, or groin. ? Redness and swelling in your leg. Watch closely for changes in your health, and be sure to contact your doctor if you have any problems. Where can you learn more? Go to http://www.gray.com/  Enter Y838 in the search box to learn more about \"Laparoscopically Assisted Vaginal Hysterectomy: What to Expect at Home. \"  Current as of: February 11, 2021               Content Version: 13.0  © 2091-2639 [employer]. Care instructions adapted under license by [employer] (which disclaims liability or warranty for this information). If you have questions about a medical condition or this instruction, always ask your healthcare professional. [address] any warranty or liability for your use of this information. MEDICATION INTERACTION:  During your procedure you potentially received a medication or medications which may reduce the effectiveness of oral contraceptives. Please consider other forms of contraception for 1 month following your procedure if you are currently using oral contraceptives as your primary form of birth control. In addition to this, we recommend continuing your oral contraceptive as prescribed, unless otherwise instructed by your physician, during this time    After general anesthesia or intravenous sedation, for 24 hours or while taking prescription Narcotics:  · Limit your activities  · A responsible adult needs to be with you for the next 24 hours  · Do not drive and operate hazardous machinery  · Do not make important personal or business decisions  · Do not drink alcoholic beverages  · If you have not urinated within 8 hours after discharge, please contact your surgeon on call. · If you have sleep apnea and have a CPAP machine, please use it for all naps and sleeping. · Please use caution when taking narcotics and any of your home medications that may cause drowsiness.     *  Please give a list of your current medications to your Primary Care Provider. *  Please update this list whenever your medications are discontinued, doses are      changed, or new medications (including over-the-counter products) are added. *  Please carry medication information at all times in case of emergency situations. These are general instructions for a healthy lifestyle:  No smoking/ No tobacco products/ Avoid exposure to second hand smoke  Surgeon General's Warning:  Quitting smoking now greatly reduces serious risk to your health. Obesity, smoking, and sedentary lifestyle greatly increases your risk for illness  A healthy diet, regular physical exercise & weight monitoring are important for maintaining a healthy lifestyle    You may be retaining fluid if you have a history of heart failure or if you experience any of the following symptoms:  Weight gain of 3 pounds or more overnight or 5 pounds in a week, increased swelling in our hands or feet or shortness of breath while lying flat in bed. Please call your doctor as soon as you notice any of these symptoms; do not wait until your next office visit.

## 2021-12-07 NOTE — OP NOTES
Laparoscopic Hysterectomy Operative Report    Patient: Reinaldo Chi MRN: [id number]  SSN: [id number]    YOB: 1985  Age: 39 y.o. Sex: female      Date of Surgery: 12/7/2021     Preoperative Diagnosis: Menorrhagia with regular cycle [N92.0]  Pelvic pain [R10.2]     Postoperative Diagnosis: Menorrhagia with regular cycle [N92.0]  Pelvic pain [R10.2]     Surgeon(s) and Role:     * Jessica Caruso DO - Primary     * Lizette Poon DO - Assisting     Anesthesia: General     Procedure:  Procedure(s): HYSTERECTOMY TOTAL LAPAROSCOPIC WITH BILATERAL SALPINGECTOMY; CYSTOSCOPY     Findings: uterus sounded to 10cm. Grossly normal ovaries, tubes, liver edge and appendix. During cystoscopy, both ureteral jets effluxed and no evidence of suture. Estimated Blood Loss:    100cc    Drains: ibarra     Fluids:  1250cc    Urine Output:  1000cc        Specimens:    ID Type Source Tests Collected by Time Destination   1 : UTERUS, BILATERAL TUBES Fresh   Madie Caruso DO 12/7/2021 0805 Pathology         DVT Prophylaxis: scds     Antibiotic Prophylaxis: ancef     Procedure in Detail:  Patient was taken to the operating room where she was administered geta. Once her anesthesia was found to be adequate, and appropiate time out occurred, she was prepped and draped in normal sterile fashion with arms tucked. Next v-care uterine manipulator was placed around external cervical os and balloon was inflated. Next attention was turned to the umbilicus. A 5mm skin incision was made and Veress needle was inserted with clear aspiration of fluid. Next Co2 gas was connected with opening pressure noted to be 5 mmHg. Co2 gas was instilled until abdominal was insufflated. Patient was placed in steep trendelenburg. Next a 10mm right port site was placed under direct visualization. Next 5mm port was placed in left lower quadrant under direct visualization.   Survey was taken of the abdomen and pelvis with findings as noted above. Next attention was turned to left fallopian tube which was elevated and the tube was removed using the harmonic scalpel. Next, the round ligament and  uteroovarian were taken down with the harmonic scalpel. The harmonic was used to take down the cardinals and the uterine artery pedicle with excellent hemostasis. Next bladder flap was extremely adherent and this was  Dissected moving ibarra tip to ensure out of way. Next the right adnexa was taken down in similar fashion to left. At this time, the harmonic scalpel was used to dissect the vaginal cuff at the apex of the v-care cup. Next the uterus was pulled into the vaginal vault and the cuff was sutured using 0-vicryl in figure of 8 on angles and stratafix was used to close the central defect with excellent hemostasis. Next the pelvic was irrigated and excellent hemostasis was insured. Pressure was reduced and attention was turned to the bladder for cystoscopy with findings as above. Next attention was redirected to the pelvis with good hemostasis. All ports were removed under direct visualization. The skin site over both lower quadrant sites  was sutured with 4-0 vicryl. An interrupted suture was placed in umbilicus. Dermabond was placed over all sites. Pt tolerated the procedure well and was taken to the PACU in stable fashion. An assistant helped operate the camera, retract, perform their side of the case and dissect very adherent bladder flap.       Signed By: Jose Villa DO     December 7, 2021

## 2021-12-07 NOTE — ANESTHESIA POSTPROCEDURE EVALUATION
Procedure(s): HYSTERECTOMY TOTAL LAPAROSCOPIC WITH BILATERAL SALPINGECTOMY; CYSTOSCOPY. general    Anesthesia Post Evaluation      Multimodal analgesia: multimodal analgesia used between 6 hours prior to anesthesia start to PACU discharge  Patient location during evaluation: PACU  Patient participation: complete - patient participated  Level of consciousness: awake  Pain management: adequate  Airway patency: patent  Anesthetic complications: no  Cardiovascular status: acceptable  Respiratory status: acceptable  Hydration status: acceptable  Post anesthesia nausea and vomiting:  none  Final Post Anesthesia Temperature Assessment:  Normothermia (36.0-37.5 degrees C)      INITIAL Post-op Vital signs:   Vitals Value Taken Time   /68 12/07/21 1105   Temp 37.2 °C (98.9 °F) 12/07/21 0918   Pulse 72 12/07/21 1108   Resp 16 12/07/21 0930   SpO2 97 % 12/07/21 1108   Vitals shown include unvalidated device data.

## 2021-12-07 NOTE — ANESTHESIA PREPROCEDURE EVALUATION
Anesthetic History   No history of anesthetic complications            Review of Systems / Medical History  Patient summary reviewed and pertinent labs reviewed    Pulmonary  Within defined limits                 Neuro/Psych   Within defined limits           Cardiovascular  Within defined limits                Exercise tolerance: >4 METS     GI/Hepatic/Renal  Within defined limits              Endo/Other      Hypothyroidism: well controlled  Obesity     Other Findings              Physical Exam    Airway  Mallampati: II  TM Distance: 4 - 6 cm  Neck ROM: normal range of motion   Mouth opening: Diminished (comment)     Cardiovascular  Regular rate and rhythm,  S1 and S2 normal,  no murmur, click, rub, or gallop  Rhythm: regular  Rate: normal         Dental  No notable dental hx       Pulmonary  Breath sounds clear to auscultation               Abdominal  GI exam deferred       Other Findings            Anesthetic Plan    ASA: 2  Anesthesia type: general          Induction: Intravenous  Anesthetic plan and risks discussed with: Patient and Family

## 2021-12-15 ENCOUNTER — HOSPITAL ENCOUNTER (EMERGENCY)
Age: 36
Discharge: HOME | End: 2021-12-16
Attending: STUDENT IN AN ORGANIZED HEALTH CARE EDUCATION/TRAINING PROGRAM
Payer: COMMERCIAL

## 2021-12-15 DIAGNOSIS — K35.80 ACUTE APPENDICITIS, UNSPECIFIED ACUTE APPENDICITIS TYPE: Primary | ICD-10-CM

## 2021-12-15 LAB
ANION GAP SERPL CALC-SCNC: 5 MMOL/L (ref 7–16)
BASOPHILS # BLD: 0.1 K/UL (ref 0–0.2)
BASOPHILS NFR BLD: 0 % (ref 0–2)
BUN SERPL-MCNC: 14 MG/DL (ref 6–23)
CALCIUM SERPL-MCNC: 9.2 MG/DL (ref 8.3–10.4)
CHLORIDE SERPL-SCNC: 103 MMOL/L (ref 98–107)
CO2 SERPL-SCNC: 30 MMOL/L (ref 21–32)
CREAT SERPL-MCNC: 0.7 MG/DL (ref 0.6–1)
DIFFERENTIAL METHOD BLD: ABNORMAL
EOSINOPHIL # BLD: 0.4 K/UL (ref 0–0.8)
EOSINOPHIL NFR BLD: 3 % (ref 0.5–7.8)
ERYTHROCYTE [DISTWIDTH] IN BLOOD BY AUTOMATED COUNT: 12.1 % (ref 11.9–14.6)
GLUCOSE SERPL-MCNC: 105 MG/DL (ref 65–100)
HCT VFR BLD AUTO: 43.5 % (ref 35.8–46.3)
HGB BLD-MCNC: 14.7 G/DL (ref 11.7–15.4)
IMM GRANULOCYTES # BLD AUTO: 0 K/UL (ref 0–0.5)
IMM GRANULOCYTES NFR BLD AUTO: 0 % (ref 0–5)
LYMPHOCYTES # BLD: 1.5 K/UL (ref 0.5–4.6)
LYMPHOCYTES NFR BLD: 10 % (ref 13–44)
MCH RBC QN AUTO: 30.6 PG (ref 26.1–32.9)
MCHC RBC AUTO-ENTMCNC: 33.8 G/DL (ref 31.4–35)
MCV RBC AUTO: 90.4 FL (ref 79.6–97.8)
MONOCYTES # BLD: 0.9 K/UL (ref 0.1–1.3)
MONOCYTES NFR BLD: 6 % (ref 4–12)
NEUTS SEG # BLD: 11.2 K/UL (ref 1.7–8.2)
NEUTS SEG NFR BLD: 81 % (ref 43–78)
NRBC # BLD: 0 K/UL (ref 0–0.2)
PLATELET # BLD AUTO: 341 K/UL (ref 150–450)
PMV BLD AUTO: 10.8 FL (ref 9.4–12.3)
POTASSIUM SERPL-SCNC: 3.9 MMOL/L (ref 3.5–5.1)
RBC # BLD AUTO: 4.81 M/UL (ref 4.05–5.2)
SODIUM SERPL-SCNC: 138 MMOL/L (ref 136–145)
WBC # BLD AUTO: 14.1 K/UL (ref 4.3–11.1)

## 2021-12-15 PROCEDURE — 80048 BASIC METABOLIC PNL TOTAL CA: CPT

## 2021-12-15 PROCEDURE — 87210 SMEAR WET MOUNT SALINE/INK: CPT

## 2021-12-15 PROCEDURE — 85025 COMPLETE CBC W/AUTO DIFF WBC: CPT

## 2021-12-15 PROCEDURE — 99284 EMERGENCY DEPT VISIT MOD MDM: CPT

## 2021-12-15 PROCEDURE — 87491 CHLMYD TRACH DNA AMP PROBE: CPT

## 2021-12-16 ENCOUNTER — ANESTHESIA (OUTPATIENT)
Dept: SURGERY | Age: 36
End: 2021-12-16
Payer: COMMERCIAL

## 2021-12-16 ENCOUNTER — APPOINTMENT (OUTPATIENT)
Dept: CT IMAGING | Age: 36
End: 2021-12-16
Attending: PHYSICIAN ASSISTANT
Payer: COMMERCIAL

## 2021-12-16 ENCOUNTER — ANESTHESIA EVENT (OUTPATIENT)
Dept: SURGERY | Age: 36
End: 2021-12-16
Payer: COMMERCIAL

## 2021-12-16 VITALS
HEART RATE: 83 BPM | WEIGHT: 220.9 LBS | HEIGHT: 64 IN | RESPIRATION RATE: 16 BRPM | BODY MASS INDEX: 37.71 KG/M2 | DIASTOLIC BLOOD PRESSURE: 66 MMHG | SYSTOLIC BLOOD PRESSURE: 117 MMHG | TEMPERATURE: 97.9 F | OXYGEN SATURATION: 93 %

## 2021-12-16 PROBLEM — K35.80 ACUTE APPENDICITIS: Status: ACTIVE | Noted: 2021-12-16

## 2021-12-16 LAB
COVID-19 RAPID TEST, COVR: NOT DETECTED
SERVICE CMNT-IMP: NORMAL
SOURCE, COVRS: NORMAL
WET PREP GENITAL: NORMAL

## 2021-12-16 PROCEDURE — 74011250636 HC RX REV CODE- 250/636: Performed by: ANESTHESIOLOGY

## 2021-12-16 PROCEDURE — 74011000258 HC RX REV CODE- 258: Performed by: SURGERY

## 2021-12-16 PROCEDURE — 77030021158 HC TRCR BLN GELPRT AMR -B: Performed by: SURGERY

## 2021-12-16 PROCEDURE — 77030040922 HC BLNKT HYPOTHRM STRY -A: Performed by: ANESTHESIOLOGY

## 2021-12-16 PROCEDURE — 74011000636 HC RX REV CODE- 636: Performed by: EMERGENCY MEDICINE

## 2021-12-16 PROCEDURE — 76060000032 HC ANESTHESIA 0.5 TO 1 HR: Performed by: SURGERY

## 2021-12-16 PROCEDURE — 77030008522 HC TBNG INSUF LAPRO STRY -B: Performed by: SURGERY

## 2021-12-16 PROCEDURE — 74011000250 HC RX REV CODE- 250: Performed by: SURGERY

## 2021-12-16 PROCEDURE — 77030016151 HC PROTCTR LNS DFOG COVD -B: Performed by: SURGERY

## 2021-12-16 PROCEDURE — 77030008756 HC TU IRR SUC STRY -B: Performed by: SURGERY

## 2021-12-16 PROCEDURE — 77030008606 HC TRCR ENDOSC KII AMR -B: Performed by: SURGERY

## 2021-12-16 PROCEDURE — 77030007955 HC PCH ENDOSC SPEC J&J -B: Performed by: SURGERY

## 2021-12-16 PROCEDURE — 74011250636 HC RX REV CODE- 250/636: Performed by: SURGERY

## 2021-12-16 PROCEDURE — 77030037088 HC TUBE ENDOTRACH ORAL NSL COVD-A: Performed by: ANESTHESIOLOGY

## 2021-12-16 PROCEDURE — 77030034154 HC SHR COAG HARM ACE J&J -F: Performed by: SURGERY

## 2021-12-16 PROCEDURE — 77030034849: Performed by: SURGERY

## 2021-12-16 PROCEDURE — 74011250636 HC RX REV CODE- 250/636: Performed by: NURSE ANESTHETIST, CERTIFIED REGISTERED

## 2021-12-16 PROCEDURE — 77030020829: Performed by: SURGERY

## 2021-12-16 PROCEDURE — 74177 CT ABD & PELVIS W/CONTRAST: CPT

## 2021-12-16 PROCEDURE — 77030009967 HC RELD STPLR ENDOSC J&J -C: Performed by: SURGERY

## 2021-12-16 PROCEDURE — 77030018836 HC SOL IRR NACL ICUM -A: Performed by: SURGERY

## 2021-12-16 PROCEDURE — 88304 TISSUE EXAM BY PATHOLOGIST: CPT

## 2021-12-16 PROCEDURE — 77030039425 HC BLD LARYNG TRULITE DISP TELE -A: Performed by: ANESTHESIOLOGY

## 2021-12-16 PROCEDURE — 74011250637 HC RX REV CODE- 250/637: Performed by: ANESTHESIOLOGY

## 2021-12-16 PROCEDURE — 76010000160 HC OR TIME 0.5 TO 1 HR INTENSV-TIER 1: Performed by: SURGERY

## 2021-12-16 PROCEDURE — 76210000016 HC OR PH I REC 1 TO 1.5 HR: Performed by: SURGERY

## 2021-12-16 PROCEDURE — 74011000250 HC RX REV CODE- 250: Performed by: NURSE ANESTHETIST, CERTIFIED REGISTERED

## 2021-12-16 PROCEDURE — 44970 LAPAROSCOPY APPENDECTOMY: CPT | Performed by: SURGERY

## 2021-12-16 PROCEDURE — 76210000020 HC REC RM PH II FIRST 0.5 HR: Performed by: SURGERY

## 2021-12-16 PROCEDURE — 87635 SARS-COV-2 COVID-19 AMP PRB: CPT

## 2021-12-16 PROCEDURE — 74011000258 HC RX REV CODE- 258: Performed by: EMERGENCY MEDICINE

## 2021-12-16 PROCEDURE — 77030011810 HC STPLR ENDOSC J&J -G: Performed by: SURGERY

## 2021-12-16 PROCEDURE — 77030031139 HC SUT VCRL2 J&J -A: Performed by: SURGERY

## 2021-12-16 PROCEDURE — 2709999900 HC NON-CHARGEABLE SUPPLY: Performed by: SURGERY

## 2021-12-16 RX ORDER — SODIUM CHLORIDE 0.9 % (FLUSH) 0.9 %
10 SYRINGE (ML) INJECTION
Status: COMPLETED | OUTPATIENT
Start: 2021-12-16 | End: 2021-12-16

## 2021-12-16 RX ORDER — ONDANSETRON 2 MG/ML
INJECTION INTRAMUSCULAR; INTRAVENOUS AS NEEDED
Status: DISCONTINUED | OUTPATIENT
Start: 2021-12-16 | End: 2021-12-16 | Stop reason: HOSPADM

## 2021-12-16 RX ORDER — CEFOXITIN 2 G/1
2 INJECTION, POWDER, FOR SOLUTION INTRAVENOUS ONCE
Status: DISCONTINUED | OUTPATIENT
Start: 2021-12-16 | End: 2021-12-16 | Stop reason: SDUPTHER

## 2021-12-16 RX ORDER — PROPOFOL 10 MG/ML
INJECTION, EMULSION INTRAVENOUS AS NEEDED
Status: DISCONTINUED | OUTPATIENT
Start: 2021-12-16 | End: 2021-12-16 | Stop reason: HOSPADM

## 2021-12-16 RX ORDER — SODIUM CHLORIDE 0.9 % (FLUSH) 0.9 %
5-40 SYRINGE (ML) INJECTION AS NEEDED
Status: DISCONTINUED | OUTPATIENT
Start: 2021-12-16 | End: 2021-12-16 | Stop reason: HOSPADM

## 2021-12-16 RX ORDER — GLYCOPYRROLATE 0.2 MG/ML
INJECTION INTRAMUSCULAR; INTRAVENOUS AS NEEDED
Status: DISCONTINUED | OUTPATIENT
Start: 2021-12-16 | End: 2021-12-16 | Stop reason: HOSPADM

## 2021-12-16 RX ORDER — HYDROMORPHONE HYDROCHLORIDE 2 MG/ML
0.5 INJECTION, SOLUTION INTRAMUSCULAR; INTRAVENOUS; SUBCUTANEOUS
Status: DISCONTINUED | OUTPATIENT
Start: 2021-12-16 | End: 2021-12-16 | Stop reason: HOSPADM

## 2021-12-16 RX ORDER — DEXAMETHASONE SODIUM PHOSPHATE 4 MG/ML
INJECTION, SOLUTION INTRA-ARTICULAR; INTRALESIONAL; INTRAMUSCULAR; INTRAVENOUS; SOFT TISSUE AS NEEDED
Status: DISCONTINUED | OUTPATIENT
Start: 2021-12-16 | End: 2021-12-16 | Stop reason: HOSPADM

## 2021-12-16 RX ORDER — ACETAMINOPHEN 500 MG
1000 TABLET ORAL ONCE
Status: COMPLETED | OUTPATIENT
Start: 2021-12-16 | End: 2021-12-16

## 2021-12-16 RX ORDER — FENTANYL CITRATE 50 UG/ML
100 INJECTION, SOLUTION INTRAMUSCULAR; INTRAVENOUS ONCE
Status: DISCONTINUED | OUTPATIENT
Start: 2021-12-16 | End: 2021-12-16 | Stop reason: HOSPADM

## 2021-12-16 RX ORDER — EPHEDRINE SULFATE/0.9% NACL/PF 50 MG/5 ML
SYRINGE (ML) INTRAVENOUS AS NEEDED
Status: DISCONTINUED | OUTPATIENT
Start: 2021-12-16 | End: 2021-12-16 | Stop reason: HOSPADM

## 2021-12-16 RX ORDER — HYDROCODONE BITARTRATE AND ACETAMINOPHEN 5; 325 MG/1; MG/1
1 TABLET ORAL AS NEEDED
Status: DISCONTINUED | OUTPATIENT
Start: 2021-12-16 | End: 2021-12-16 | Stop reason: HOSPADM

## 2021-12-16 RX ORDER — KETOROLAC TROMETHAMINE 30 MG/ML
INJECTION, SOLUTION INTRAMUSCULAR; INTRAVENOUS AS NEEDED
Status: DISCONTINUED | OUTPATIENT
Start: 2021-12-16 | End: 2021-12-16 | Stop reason: HOSPADM

## 2021-12-16 RX ORDER — SODIUM CHLORIDE, SODIUM LACTATE, POTASSIUM CHLORIDE, CALCIUM CHLORIDE 600; 310; 30; 20 MG/100ML; MG/100ML; MG/100ML; MG/100ML
150 INJECTION, SOLUTION INTRAVENOUS CONTINUOUS
Status: DISCONTINUED | OUTPATIENT
Start: 2021-12-16 | End: 2021-12-16 | Stop reason: HOSPADM

## 2021-12-16 RX ORDER — ROCURONIUM BROMIDE 10 MG/ML
INJECTION, SOLUTION INTRAVENOUS AS NEEDED
Status: DISCONTINUED | OUTPATIENT
Start: 2021-12-16 | End: 2021-12-16 | Stop reason: HOSPADM

## 2021-12-16 RX ORDER — BUPIVACAINE HYDROCHLORIDE 2.5 MG/ML
INJECTION, SOLUTION EPIDURAL; INFILTRATION; INTRACAUDAL AS NEEDED
Status: DISCONTINUED | OUTPATIENT
Start: 2021-12-16 | End: 2021-12-16 | Stop reason: HOSPADM

## 2021-12-16 RX ORDER — FAMOTIDINE 20 MG/1
20 TABLET, FILM COATED ORAL ONCE
Status: COMPLETED | OUTPATIENT
Start: 2021-12-16 | End: 2021-12-16

## 2021-12-16 RX ORDER — SODIUM CHLORIDE 9 MG/ML
50 INJECTION, SOLUTION INTRAVENOUS CONTINUOUS
Status: DISCONTINUED | OUTPATIENT
Start: 2021-12-16 | End: 2021-12-16 | Stop reason: HOSPADM

## 2021-12-16 RX ORDER — SODIUM CHLORIDE 0.9 % (FLUSH) 0.9 %
5-40 SYRINGE (ML) INJECTION EVERY 8 HOURS
Status: DISCONTINUED | OUTPATIENT
Start: 2021-12-16 | End: 2021-12-16 | Stop reason: HOSPADM

## 2021-12-16 RX ORDER — MIDAZOLAM HYDROCHLORIDE 1 MG/ML
2 INJECTION, SOLUTION INTRAMUSCULAR; INTRAVENOUS
Status: COMPLETED | OUTPATIENT
Start: 2021-12-16 | End: 2021-12-16

## 2021-12-16 RX ORDER — LIDOCAINE HYDROCHLORIDE 20 MG/ML
INJECTION, SOLUTION EPIDURAL; INFILTRATION; INTRACAUDAL; PERINEURAL AS NEEDED
Status: DISCONTINUED | OUTPATIENT
Start: 2021-12-16 | End: 2021-12-16 | Stop reason: HOSPADM

## 2021-12-16 RX ORDER — LIDOCAINE HYDROCHLORIDE 10 MG/ML
0.1 INJECTION INFILTRATION; PERINEURAL AS NEEDED
Status: DISCONTINUED | OUTPATIENT
Start: 2021-12-16 | End: 2021-12-16 | Stop reason: HOSPADM

## 2021-12-16 RX ORDER — ACETAMINOPHEN 500 MG
1000 TABLET ORAL
Status: DISCONTINUED | OUTPATIENT
Start: 2021-12-16 | End: 2021-12-16 | Stop reason: HOSPADM

## 2021-12-16 RX ORDER — NEOSTIGMINE METHYLSULFATE 1 MG/ML
INJECTION, SOLUTION INTRAVENOUS AS NEEDED
Status: DISCONTINUED | OUTPATIENT
Start: 2021-12-16 | End: 2021-12-16 | Stop reason: HOSPADM

## 2021-12-16 RX ORDER — OXYCODONE AND ACETAMINOPHEN 5; 325 MG/1; MG/1
1 TABLET ORAL
Qty: 20 TABLET | Refills: 0 | Status: SHIPPED | OUTPATIENT
Start: 2021-12-16 | End: 2021-12-21

## 2021-12-16 RX ORDER — FENTANYL CITRATE 50 UG/ML
INJECTION, SOLUTION INTRAMUSCULAR; INTRAVENOUS AS NEEDED
Status: DISCONTINUED | OUTPATIENT
Start: 2021-12-16 | End: 2021-12-16 | Stop reason: HOSPADM

## 2021-12-16 RX ADMIN — SODIUM CHLORIDE 100 ML: 9 INJECTION, SOLUTION INTRAVENOUS at 00:08

## 2021-12-16 RX ADMIN — DEXAMETHASONE SODIUM PHOSPHATE 10 MG: 4 INJECTION, SOLUTION INTRAMUSCULAR; INTRAVENOUS at 12:42

## 2021-12-16 RX ADMIN — GLYCOPYRROLATE 0.4 MG: 0.2 INJECTION, SOLUTION INTRAMUSCULAR; INTRAVENOUS at 13:09

## 2021-12-16 RX ADMIN — Medication 3 MG: at 13:09

## 2021-12-16 RX ADMIN — IOPAMIDOL 100 ML: 755 INJECTION, SOLUTION INTRAVENOUS at 00:08

## 2021-12-16 RX ADMIN — LIDOCAINE HYDROCHLORIDE 100 MG: 20 INJECTION, SOLUTION EPIDURAL; INFILTRATION; INTRACAUDAL; PERINEURAL at 12:36

## 2021-12-16 RX ADMIN — CEFOXITIN 2 G: 2 INJECTION, POWDER, FOR SOLUTION INTRAVENOUS at 12:42

## 2021-12-16 RX ADMIN — SODIUM CHLORIDE, SODIUM LACTATE, POTASSIUM CHLORIDE, AND CALCIUM CHLORIDE: 600; 310; 30; 20 INJECTION, SOLUTION INTRAVENOUS at 12:32

## 2021-12-16 RX ADMIN — ROCURONIUM BROMIDE 35 MG: 50 INJECTION, SOLUTION INTRAVENOUS at 12:36

## 2021-12-16 RX ADMIN — FAMOTIDINE 20 MG: 20 TABLET, FILM COATED ORAL at 12:21

## 2021-12-16 RX ADMIN — KETOROLAC TROMETHAMINE 30 MG: 30 INJECTION, SOLUTION INTRAMUSCULAR; INTRAVENOUS at 13:09

## 2021-12-16 RX ADMIN — PROPOFOL 150 MG: 10 INJECTION, EMULSION INTRAVENOUS at 12:36

## 2021-12-16 RX ADMIN — ACETAMINOPHEN 1000 MG: 500 TABLET, FILM COATED ORAL at 12:21

## 2021-12-16 RX ADMIN — FENTANYL CITRATE 100 MCG: 50 INJECTION INTRAMUSCULAR; INTRAVENOUS at 12:37

## 2021-12-16 RX ADMIN — HYDROCODONE BITARTRATE AND ACETAMINOPHEN 1 TABLET: 5; 325 TABLET ORAL at 14:07

## 2021-12-16 RX ADMIN — Medication 10 MG: at 12:49

## 2021-12-16 RX ADMIN — ONDANSETRON 4 MG: 2 INJECTION INTRAMUSCULAR; INTRAVENOUS at 12:52

## 2021-12-16 RX ADMIN — SODIUM CHLORIDE, SODIUM LACTATE, POTASSIUM CHLORIDE, AND CALCIUM CHLORIDE: 600; 310; 30; 20 INJECTION, SOLUTION INTRAVENOUS at 13:07

## 2021-12-16 RX ADMIN — HYDROMORPHONE HYDROCHLORIDE 0.5 MG: 2 INJECTION, SOLUTION INTRAMUSCULAR; INTRAVENOUS; SUBCUTANEOUS at 13:52

## 2021-12-16 RX ADMIN — FENTANYL CITRATE 50 MCG: 50 INJECTION INTRAMUSCULAR; INTRAVENOUS at 13:26

## 2021-12-16 RX ADMIN — MIDAZOLAM 2 MG: 1 INJECTION INTRAMUSCULAR; INTRAVENOUS at 12:22

## 2021-12-16 RX ADMIN — FENTANYL CITRATE 50 MCG: 50 INJECTION INTRAMUSCULAR; INTRAVENOUS at 12:59

## 2021-12-16 RX ADMIN — Medication 10 ML: at 00:08

## 2021-12-16 NOTE — DISCHARGE INSTRUCTIONS
Post op instructions:  1. May shower only, no tub bathing, no hot tub, no swimming. 2. Keep incision clean with regular soap and water. 3. No lifting over 10 lbs. 4. Regular diet as tolerated. 5. May remove topical dressing on Day #2. Leave steri strips until seen in Dr. Maddox's office at follow up appointment. 6. No driving on pain medicines.   7. Resume home medicines as usual.     Roseline Pucketts Varsha, DO

## 2021-12-16 NOTE — ED TRIAGE NOTES
Pt states that she had a partial hysterectomy last week. Went shopping today for approx 5 hrs. States that she had lower abd and vaginal bleeding. Continues to have lower abd pain. Did take motrin 800 mg with some relief.   Masked on arrival

## 2021-12-16 NOTE — H&P
[address]  [address] [facility] , [facility], [address]       History and Physical/Surgical Consult   Tolu Seo    Admit date: 12/15/2021    MRN: [id number]     : 1985     Age: 39 y.o.          2021 9:57 AM    Subjective/HPI:   This patient is a 39 y.o. seen and evaluated at the request of ED. This 38 y/o presents with diffuse lower abdominal pain since yesterday. She had LAVH on 21 and was recovering without pain or problems. Yesterday she decided to try to [place] [employer] and developed lower abdominal pain sudden and severe. She went home and could not get comfortable. She noticed some bleeding when using the bathroom and called her physician. She was told to go to the ED. CT shows enlarged appendix with appendicolith and minor post surgical changes. Surgery is asked to evaluate the patient for decision for surgery. EXAM: CT ABD PELV W CONT     HISTORY: Abdominal pain, bloating, vaginal bleeding, leukocytosis, hot and cold,  partial hysterectomy 8 days ago, assess for infection.      TECHNIQUE: Axial images of the abdomen and pelvis were performed following the  administration of intravenous contrast. Images were obtained in the axial plane  and coronal reformatted images were created and reviewed.      Dose reduction technique used: Automated exposure control/Adjustment of the mA  and/or kV according to patient size/Use of iterative reconstruction technique.     COMPARISON: None.     FINDINGS:   The visualized lung bases and mediastinum are unremarkable.     The liver, spleen, pancreas, adrenal glands, gallbladder, and kidneys are within  normal limits. The bladder appears grossly normal.     Distal esophagus and stomach are unremarkable. Small and large bowel are without  evidence of obstruction. The appendix is visualized and contains a small  appendicolith proximally. The appendix contains fluid.  The appendix contains  fluid and measures 8.7 mm. The distal tip extends into the mid pelvis. There is  subtle fatty stranding adjacent to the tip.     No evidence of free air. No pathologic adenopathy. No abdominal aortic aneurysm. The ovaries are visualized. The uterus is absent. There is a small amount of  fluid in the surgical bed that extends to the adnexa.     Osseous structures are without evidence of acute fracture or suspicious lesion.      IMPRESSION  Status post post hysterectomy. There is a small amount of fluid in the surgical  bed. This fluid extends into the adnexa bilaterally. There are no focal fluid  collections and there is no gas to suggest an abscess.     The appendix measures up to 8.7 mm and contains an appendicolith proximally. The  appendix contains fluid. The distal tip of the appendix is located in the mid  pelvis and there is subtle adjacent fatty stranding; however, this fatty  stranding appears to be associated with the adnexal fluid. Please correlate  clinically to exclude acute appendicitis. Review of Systems  A comprehensive review of systems was negative except for that written in the HPI. Past Medical History:   Diagnosis Date    Abnormal Pap smear 2011 & 2015    HPV +    Abnormal Papanicolaou smear of cervix  &      2004    Anemia     Iron infusions in 2016; takes Iron daily     Anxiety     Asthma     childhood    BMI 37.0-37.9, adult     Celiac disease     Depression     no meds    Endometriosis     Female dyspareunia     Genital herpes     Herpes simplex without mention of complication     Type 2    High-risk pregnancy in second trimester 2019    Flu vaccine given 10/16/19  2019 at [facility]:  Normal NT, NB present. Genetic counseling done by physician. Pt declines testing. May lift up to 45# at work at this time; limit if concerns develop 2019 at [facility]:  Normal anatomy/fetal Echo. Normal CL at 4.3 cm.  Hx of PTL and delivery at 35 weeks in 2 pregnancies prior to last Twin Pregnancy. [facility]/3/2019 at [facility]: Normal CL 3.7 cm, no funneling    History of hyperthyroidism 3/2014    treated w/radiation    Hypothyroidism     managed w/med    Infertility, female     Miscarriage     Pelvic pain in female 10/8/2015    Postpartum depression     with last pregnancy-twins     labor in third trimester without delivery 10/16/2017      Past Surgical History:   Procedure Laterality Date    HX BREAST AUGMENTATION  2008    HX  SECTION  10/16/2017,     Twins- Male    HX COLPOSCOPY      HX LEEP PROCEDURE      HX OTHER SURGICAL      Fulguration of Endometriosis    HX OTHER SURGICAL  2015    Bx to intestines (celiac dz)    HX PELVIC LAPAROSCOPY  , ,     HX WISDOM TEETH EXTRACTION  2007      No Known Allergies   Social History     Tobacco Use    Smoking status: Never Smoker    Smokeless tobacco: Never Used   Substance Use Topics    Alcohol use: Yes     Comment: very rare       Social History     Social History Narrative    Not on file     Family History   Problem Relation Age of Onset    Stroke Maternal Uncle     Heart Disease Maternal Uncle     Thyroid Disease Maternal Uncle     Prostate Cancer Maternal Uncle     Stroke Maternal Grandfather     Cancer Maternal Grandfather         brain cancer    Ovarian Cancer Paternal Grandmother     Breast Cancer Paternal Grandmother     Alzheimer's Disease Paternal Grandmother     Breast Cancer Paternal Aunt         Twice    Depression Mother     Breast Cancer Mother     Stroke Maternal Grandmother     Heart Disease Paternal Grandfather     Thyroid Disease Other         hyperthyroidism    Thyroid Disease Other         hyperthyroidism    Heart defect Sister         Hole in Heart (closed)    Other Father         Cleft Palate      Prior to Admission Medications   Prescriptions Last Dose Informant Patient Reported? Taking? CALCIUM PO   Yes No   Sig: Take  by mouth daily.    FERROUS SULFATE (IRON PO)   Yes No   Sig: Take 1 Tablet by mouth daily. VITAMIN B COMPLEX NO.12-NIACIN PO   Yes No   Sig: Take  by mouth. acetaminophen (Tylenol Extra Strength) 500 mg tablet   No No   Sig: Take 2 Tablets by mouth every six (6) hours as needed for Pain. ascorbic acid, vitamin C, (VITAMIN C) 500 mg tablet   Yes No   Sig: Take 500 mg by mouth daily. cholecalciferol (VITAMIN D3) 1,000 unit cap   Yes No   Sig: Take  by mouth daily. folic acid 494 mcg tablet   Yes No   Sig: Take 800 mcg by mouth daily. ibuprofen (MOTRIN) 800 mg tablet   No No   Sig: Take 1 Tablet by mouth every eight (8) hours as needed for Pain.   levothyroxine (SYNTHROID) 175 mcg tablet   Yes No   Sig: Take 175 mcg by mouth. Saturday and Sunday   levothyroxine (Synthroid) 150 mcg tablet   Yes No   Sig: Take 150 mcg by mouth Daily (before breakfast). Monday-Friday   Indications: a condition with low thyroid hormone levels   phentermine (Adipex-P) 37.5 mg capsule   Yes No   Sig: Take 37.5 mg by mouth every morning. valACYclovir (VALTREX) 500 mg tablet   No No   Sig: Take 1 Tablet by mouth two (2) times a day. Facility-Administered Medications: None     No current facility-administered medications for this encounter. Current Outpatient Medications   Medication Sig    ibuprofen (MOTRIN) 800 mg tablet Take 1 Tablet by mouth every eight (8) hours as needed for Pain.  acetaminophen (Tylenol Extra Strength) 500 mg tablet Take 2 Tablets by mouth every six (6) hours as needed for Pain.  levothyroxine (Synthroid) 150 mcg tablet Take 150 mcg by mouth Daily (before breakfast). Monday-Friday   Indications: a condition with low thyroid hormone levels    valACYclovir (VALTREX) 500 mg tablet Take 1 Tablet by mouth two (2) times a day.  phentermine (Adipex-P) 37.5 mg capsule Take 37.5 mg by mouth every morning.  levothyroxine (SYNTHROID) 175 mcg tablet Take 175 mcg by mouth.  Saturday and Sunday    ascorbic acid, vitamin C, (VITAMIN C) 500 mg tablet Take 500 mg by mouth daily.  folic acid 108 mcg tablet Take 800 mcg by mouth daily.  cholecalciferol (VITAMIN D3) 1,000 unit cap Take  by mouth daily.  VITAMIN B COMPLEX NO.12-NIACIN PO Take  by mouth.  CALCIUM PO Take  by mouth daily.  FERROUS SULFATE (IRON PO) Take 1 Tablet by mouth daily. Objective:     Vitals:    12/15/21 2103 12/16/21 0145 12/16/21 0604   BP: 100/74 122/65 (!) 116/59   Pulse: 84     Resp: 20 16 20   Temp: 98.3 °F (36.8 °C)     SpO2: 97% 100% 98%   Weight: 220 lb 14.4 oz (100.2 kg)     Height: 5' 4\" (1.626 m)       No intake/output data recorded. 12/14 1901 - 12/16 0700  In: 100 [I.V.:100]  Out: -   Physical Exam:   Gen- the patient is well developed and in no acute distress  HEENT- PERRL, EOMI, no scleral icterus       nose without alar flaring or epistaxis                  oral muscosa moist without cyanosis  Neck- no JVD or retractions  Lungs- resp even/unlab   Heart- RRR   Abd- soft, diffuse TTP lower abdomen. Positive guarding and rebound. Incisions clean and dry. Ext- warm without cyanosis. There is no lower leg edema. Skin- no jaundice or rashes  Neuro- alert and oriented x 3. No gross sensorimotor deficits are present. Data Review   Recent Labs     12/15/21  2205   WBC 14.1*   HGB 14.7   HCT 43.5        Recent Labs     12/15/21  2205      K 3.9      CO2 30   *   BUN 14   CREA 0.70       Assessment:     Hospital Problems  Date Reviewed: 12/7/2021          Codes Class Noted POA    * (Principal) Acute appendicitis ICD-10-CM: K35.80  ICD-9-CM: 540.9  12/16/2021 Unknown            Plan:     --  Decision for surgery for laparoscopic appendectomy possible open procedure  Mefoxin IV  NPO  Consent for lap appy possible open procedure.     Emily Maddox,

## 2021-12-16 NOTE — ANESTHESIA POSTPROCEDURE EVALUATION
Procedure(s):  APPENDECTOMY LAPAROSCOPIC. general    Anesthesia Post Evaluation      Multimodal analgesia: multimodal analgesia used between 6 hours prior to anesthesia start to PACU discharge  Patient location during evaluation: bedside  Patient participation: complete - patient participated  Level of consciousness: awake  Pain management: adequate  Airway patency: patent  Anesthetic complications: no  Cardiovascular status: acceptable and stable  Respiratory status: acceptable and room air  Hydration status: acceptable  Post anesthesia nausea and vomiting:  none  Final Post Anesthesia Temperature Assessment:  Normothermia (36.0-37.5 degrees C)      INITIAL Post-op Vital signs:   Vitals Value Taken Time   /70 12/16/21 1405   Temp 36.6 °C (97.9 °F) 12/16/21 1329   Pulse 71 12/16/21 1408   Resp 16 12/16/21 1405   SpO2 94 % 12/16/21 1408   Vitals shown include unvalidated device data.

## 2021-12-16 NOTE — BRIEF OP NOTE
Brief Postoperative Note    Patient: Rene Veronica  YOB: 1985  MRN: [id number]    Date of Procedure: 12/16/2021     Pre-Op Diagnosis: Acute abdominal pain    Post-Op Diagnosis: Same as preoperative diagnosis. Procedure(s):  APPENDECTOMY LAPAROSCOPIC CPT 62572    Surgeon(s):  Kodi Thompson DO    Surgical Assistant: None    Anesthesia: General     Estimated Blood Loss (mL): Minimal    Complications: None    Specimens:   ID Type Source Tests Collected by Time Destination   1 : appendix Preservative Appendix  Kodi Thompson DO 12/16/2021 1310 Pathology        Implants: * No implants in log *    Drains: * No LDAs found *    Findings: Laparoscopic appendectomy with acute focal appendicitis. No adhesions or abscess. No evidence of bowel injury no evidence of complications from hysterectomy 9 days ago.     Electronically Signed by Lizbet Chow DO on 12/16/2021 at 1:18 PM  [id number]

## 2021-12-16 NOTE — ED NOTES
Called  Dr. Christos Mitchell, please call [phone] for 2nd consult. Agree with resident note  pt to follow up in clinic next week  expectant management as this is a desired pregnancy

## 2021-12-16 NOTE — ANESTHESIA PREPROCEDURE EVALUATION
Anesthetic History   No history of anesthetic complications            Review of Systems / Medical History  Patient summary reviewed and pertinent labs reviewed    Pulmonary  Within defined limits                 Neuro/Psych   Within defined limits           Cardiovascular  Within defined limits                Exercise tolerance: >4 METS     GI/Hepatic/Renal  Within defined limits             Comments: abd pain , appendicitis Endo/Other      Hypothyroidism: well controlled  Obesity     Other Findings              Physical Exam    Airway  Mallampati: II  TM Distance: 4 - 6 cm  Neck ROM: normal range of motion   Mouth opening: Diminished (comment)     Cardiovascular  Regular rate and rhythm,  S1 and S2 normal,  no murmur, click, rub, or gallop  Rhythm: regular  Rate: normal         Dental  No notable dental hx       Pulmonary  Breath sounds clear to auscultation               Abdominal  GI exam deferred       Other Findings            Anesthetic Plan    ASA: 2  Anesthesia type: general          Induction: Intravenous  Anesthetic plan and risks discussed with: Patient and Mother

## 2021-12-16 NOTE — ED PROVIDER NOTES
Patient had a partial hysterectomy on  by Dr. Charles Pearson here at this hospital.  She states that she was doing fine until today when she had gone shopping for about 5 hours and when she sat down she felt some cramping in her abdomen and bloating. She started having nausea, feeling warm and cold and thought she was getting the stomach bug that her kids have had. She went home and laid down and noticed some vaginal bleeding. She states she called the office and they said if the bleeding got worse to come to the ED and it did become bright red so she came in. Her mother is here with her. She took some ibuprofen which seemed to help. She did ambulate to the room without difficulty and is well-hydrated. The history is provided by the patient. Vaginal Bleeding  This is a new problem. The current episode started 3 to 5 hours ago. The problem occurs constantly. The problem has not changed since onset. Pertinent negatives include no chest pain, no abdominal pain, no headaches and no shortness of breath. Nothing aggravates the symptoms. Nothing relieves the symptoms. She has tried nothing for the symptoms. Pelvic Pain  This is a new problem. The current episode started 3 to 5 hours ago. The problem occurs constantly. The problem has been gradually worsening. Pertinent negatives include no chest pain, no abdominal pain, no headaches and no shortness of breath. Nothing aggravates the symptoms. Nothing relieves the symptoms. She has tried nothing for the symptoms.         Past Medical History:   Diagnosis Date    Abnormal Pap smear 2011 & 2015    HPV +    Abnormal Papanicolaou smear of cervix  &      2004    Anemia     Iron infusions in 2016; takes Iron daily     Anxiety     Asthma     childhood    BMI 37.0-37.9, adult     Celiac disease     Depression     no meds    Endometriosis     Female dyspareunia     Genital herpes     Herpes simplex without mention of complication Type 2    High-risk pregnancy in second trimester 2019    Flu vaccine given 10/16/19  2019 at [facility]:  Normal NT, NB present. Genetic counseling done by physician. Pt declines testing. May lift up to 45# at work at this time; limit if concerns develop 2019 at [facility]:  Normal anatomy/fetal Echo. Normal CL at 4.3 cm. Hx of PTL and delivery at 35 weeks in 2 pregnancies prior to last Twin Pregnancy.  10/3/2019 at [facility]: Normal CL 3.7 cm, no funneling    History of hyperthyroidism 3/2014    treated w/radiation    Hypothyroidism     managed w/med    Infertility, female     Miscarriage     Pelvic pain in female 10/8/2015    Postpartum depression     with last pregnancy-twins     labor in third trimester without delivery 10/16/2017       Past Surgical History:   Procedure Laterality Date    HX BREAST AUGMENTATION  2008    HX  SECTION  10/16/2017,     Twins- Male    HX COLPOSCOPY      HX LEEP PROCEDURE      HX OTHER SURGICAL      Fulguration of Endometriosis    HX OTHER SURGICAL  2015    Bx to intestines (celiac dz)    HX PELVIC LAPAROSCOPY  , ,     HX WISDOM TEETH EXTRACTION  2007         Family History:   Problem Relation Age of Onset    Stroke Maternal Uncle     Heart Disease Maternal Uncle     Thyroid Disease Maternal Uncle     Prostate Cancer Maternal Uncle     Stroke Maternal Grandfather     Cancer Maternal Grandfather         brain cancer    Ovarian Cancer Paternal Grandmother     Breast Cancer Paternal Grandmother     Alzheimer's Disease Paternal Grandmother     Breast Cancer Paternal Aunt         Twice    Depression Mother     Breast Cancer Mother     Stroke Maternal Grandmother     Heart Disease Paternal Grandfather     Thyroid Disease Other         hyperthyroidism    Thyroid Disease Other         hyperthyroidism    Heart defect Sister         Hole in Heart (closed)    Other Father         Cleft Palate       Social History Socioeconomic History    Marital status: SINGLE     Spouse name: Not on file    Number of children: Not on file    Years of education: Not on file    Highest education level: Not on file   Occupational History    Not on file   Tobacco Use    Smoking status: Never Smoker    Smokeless tobacco: Never Used   Vaping Use    Vaping Use: Never used   Substance and Sexual Activity    Alcohol use: Yes     Comment: very rare     Drug use: No    Sexual activity: Yes     Partners: Male     Birth control/protection: None   Other Topics Concern    Not on file   Social History Narrative    Not on file     Social Determinants of Health     Financial Resource Strain:     Difficulty of Paying Living Expenses: Not on file   Food Insecurity:     Worried About Running Out of Food in the Last Year: Not on file    Elza of Food in the Last Year: Not on file   Transportation Needs:     Lack of Transportation (Medical): Not on file    Lack of Transportation (Non-Medical): Not on file   Physical Activity:     Days of Exercise per Week: Not on file    Minutes of Exercise per Session: Not on file   Stress:     Feeling of Stress : Not on file   Social Connections:     Frequency of Communication with Friends and Family: Not on file    Frequency of Social Gatherings with Friends and Family: Not on file    Attends [employer] Services: Not on file    Active Member of [address] [place] [address] [employer] or Organizations: Not on file    Attends Club or Organization Meetings: Not on file    Marital Status: Not on file   Intimate Partner Violence:     Fear of Current or Ex-Partner: Not on file    Emotionally Abused: Not on file    Physically Abused: Not on file    Sexually Abused: Not on file   Housing Stability:     Unable to Pay for Housing in the Last Year: Not on file    Number of [place] in the Last Year: Not on file    Unstable Housing in the Last Year: Not on file         ALLERGIES: Patient has no known allergies.     Review of Systems   Constitutional: Positive for chills. HENT: Negative. Eyes: Negative. Respiratory: Negative. Negative for shortness of breath. Cardiovascular: Negative. Negative for chest pain. Gastrointestinal: Negative. Negative for abdominal pain. Genitourinary: Positive for pelvic pain and vaginal bleeding. Musculoskeletal: Negative. Skin: Negative. Neurological: Negative. Negative for headaches. Psychiatric/Behavioral: Negative. All other systems reviewed and are negative. Vitals:    12/15/21 2103   BP: 100/74   Pulse: 84   Resp: 20   Temp: 98.3 °F (36.8 °C)   SpO2: 97%   Weight: 100.2 kg (220 lb 14.4 oz)   Height: 5' 4\" (1.626 m)            Physical Exam  Vitals and nursing note reviewed. Exam conducted with a chaperone present Zia Wiley RN into assist with pelvic exam.). Constitutional:       Appearance: She is well-developed. HENT:      Head: Normocephalic and atraumatic. Right Ear: External ear normal.      Left Ear: External ear normal.      Nose: Nose normal.   Eyes:      Conjunctiva/sclera: Conjunctivae normal.      Pupils: Pupils are equal, round, and reactive to light. Cardiovascular:      Rate and Rhythm: Normal rate and regular rhythm. Heart sounds: Normal heart sounds. Pulmonary:      Effort: Pulmonary effort is normal.      Breath sounds: Normal breath sounds. Abdominal:      General: Bowel sounds are normal.      Palpations: Abdomen is soft. Tenderness: There is abdominal tenderness in the suprapubic area. Genitourinary:     Vagina: Bleeding (Very small amount of brown blood.) present. Adnexa:         Right: Tenderness present. Left: Tenderness present. Musculoskeletal:         General: Normal range of motion. Cervical back: Normal range of motion and neck supple. Skin:     General: Skin is warm and dry. Capillary Refill: Capillary refill takes less than 2 seconds.    Neurological:      General: No focal deficit present. Mental Status: She is alert and oriented to person, place, and time. Deep Tendon Reflexes: Reflexes are normal and symmetric. Psychiatric:         Mood and Affect: Mood normal.         Behavior: Behavior normal.         Thought Content: Thought content normal.         Judgment: Judgment normal.          MDM  Number of Diagnoses or Management Options     Amount and/or Complexity of Data Reviewed  Clinical lab tests: reviewed    Risk of Complications, Morbidity, and/or Mortality  Presenting problems: moderate  Diagnostic procedures: moderate  Management options: moderate    Patient Progress  Patient progress: stable         Procedures    Care transferred to Dr. Sameera Kessler at shift change. CT pending.

## 2021-12-16 NOTE — ED NOTES
Per pt, they are having procedure at 1200 today. This is based on the conversation with gen.  surgery

## 2021-12-17 NOTE — OP NOTES
[address] [address]  OPERATIVE REPORT    Name:  Georgina Velasco  MR#:  [id number]  :  1985  ACCOUNT #:  [de-identified]  DATE OF SERVICE:  2021    PREOPERATIVE DIAGNOSIS:  Acute appendicitis. POSTOPERATIVE DIAGNOSIS:  Acute appendicitis. PROCEDURE PERFORMED:  Laparoscopic appendectomy, CPT code 83358. SURGEON:  Antonina Cornejo DO    ASSISTANT:  None. ANESTHESIA:  General endotracheal.    COMPLICATIONS:  None. SPECIMENS REMOVED:  Appendix. IMPLANTS:  None. ESTIMATED BLOOD LOSS:  Minimal.    DISPOSITION:  Stable. COUNTS:  Sponge count, needle count, instrument count all correct x3. DESCRIPTION OF PROCEDURE:  This is a 60-year-old female who is status post laparoscopic-assisted vaginal hysterectomy, postop day #9, with acute onset of lower abdominal pain. She has CT findings of acute appendicitis. She is prepared for laparoscopic appendectomy, possible open procedure. Consent was obtained by describing the procedure to the patient including potential complications to include infection, bleeding, possible open procedure. Consent was obtained and placed in the final chart. She was administered Ancef 2 g IV preoperatively, taken to the operative suite, placed in the supine position and general anesthesia was initiated without complications. She was prepped and draped in the usual sterile fashion. Time-out was taken to confirm the patient name and proper procedure. Following this, a supraumbilical incision was planned. A 0.25% Marcaine with epinephrine was used to anesthetize the skin and subcutaneous tissue, a #11 scalpel blade was used to make a skin incision. Bovie cauterization was used to dissect down the rectus fascia. The fascia was incised and 0 Vicryl sutures were placed with anchoring stitches. The peritoneum was elevated between the tonsil snips, entered with the Metzenbaum scissors.   A Lyric trocar was placed in the peritoneum, secured into place and then CO2 gas was used to create a pneumoperitoneum of 15 mmHg. A laparoscope was passed into the abdomen and a brief survey revealed a benign abdomen except for an inflamed and distended appendix in the right lower quadrant. There were no postsurgical changes or adhesions from recent hysterectomy. We decided to proceed placing 5 mm trocars in the left lower quadrant and in the suprapubic region under direct visualization, no evidence of injury. The appendix was then able to be elevated, photograph was obtained. The mesoappendix was then taken down using the harmonic scalpel and the base of the appendix was divided using a blue Endo HOLLIE stapler. The appendix was retrieved, sent to Pathology for analysis. At this point, we copiously irrigated with saline until clear, ensured hemostasis using spot cauterization. Once hemostasis was confirmed, we surveyed the abdomen, there was no evidence of bowel injury, enteric contents or additional abscess, there was no fluid except for scant fluid in the pelvis which was evacuated using the suction . At this point, we concluded the case, removed all trocars in the usual fashion, there was no evidence of bleeding. The periumbilical port site was closed with 0 Vicryl in figure-of-eight fashion, irrigated once again, closed the skin edge with 3-0 Vicryl in simple running fashion. Mastisol and Steri-Strips placed on top of the incision. Sterile dressing applied. The patient will be extubated, transferred to Recovery stable. FINDINGS:  A 14-year-old female with acute appendicitis. Laparoscopic findings revealed acute focal appendicitis. There were no adhesions or abscess, no evidence of bowel injury and no evidence of complications from hysterectomy 9 days ago. She tolerated the procedure well.         [address],       [id number]  D:  12/16/2021 13:23  T:  12/16/2021 23:33  JOB #:  [id number]

## 2021-12-19 LAB
C TRACH RRNA SPEC QL NAA+PROBE: NEGATIVE
N GONORRHOEA RRNA SPEC QL NAA+PROBE: NEGATIVE
SPECIMEN SOURCE: NORMAL

## 2022-03-19 PROBLEM — E66.01 SEVERE OBESITY (HCC): Status: ACTIVE | Noted: 2020-02-05

## 2022-03-19 PROBLEM — K35.80 ACUTE APPENDICITIS: Status: ACTIVE | Noted: 2021-12-16

## 2022-03-19 PROBLEM — Z23 ENCOUNTER FOR IMMUNIZATION: Status: ACTIVE | Noted: 2020-01-08

## 2022-05-25 ENCOUNTER — OFFICE VISIT (OUTPATIENT)
Dept: OBGYN CLINIC | Age: 37
End: 2022-05-25
Payer: COMMERCIAL

## 2022-05-25 VITALS
DIASTOLIC BLOOD PRESSURE: 76 MMHG | SYSTOLIC BLOOD PRESSURE: 112 MMHG | HEIGHT: 64 IN | WEIGHT: 225.4 LBS | BODY MASS INDEX: 38.48 KG/M2

## 2022-05-25 DIAGNOSIS — F41.9 ANXIETY: Primary | ICD-10-CM

## 2022-05-25 PROCEDURE — 99213 OFFICE O/P EST LOW 20 MIN: CPT | Performed by: OBSTETRICS & GYNECOLOGY

## 2022-05-25 NOTE — PROGRESS NOTES
Cristela Roman  is a 40 y.o. female,No LMP recorded. Patient has had a hysterectomy. , who is being seen for a follow up on her Zoloft 50mg prescribed on 22 for anxiety. Pt states medication makes her sleepy but has been helping with her anxiety. She thinks it is helping with day to day irritability. She is going to counseling / journaling. Overall she is feeling better. HISTORY:    OB History        6    Para        Term   1       3    AB   2    Living   5       SAB        IAB        Ectopic        Molar        Multiple        Live Births                  GYN History         Sexual History:   Social History     Substance and Sexual Activity   Sexual Activity Not on file          has a past medical history of Abnormal Pap smear, Abnormal Papanicolaou smear of cervix, , Anemia, Anxiety, Asthma, BMI 37.0-37.9, adult, Celiac disease, Depression, Endometriosis, Female dyspareunia, Genital herpes, Herpes simplex without mention of complication, High-risk pregnancy in second trimester, History of hyperthyroidism, Hypothyroidism, Infertility, female, Miscarriage, Pelvic pain in female, Postpartum depression, and  labor in third trimester without delivery.  .    Past Surgical History:   Procedure Laterality Date    APPENDECTOMY  2021    BREAST SURGERY  2008     SECTION  10/16/2017,     Twins- Male    COLPOSCOPY      LEEP      OTHER SURGICAL HISTORY      Fulguration of Endometriosis    OTHER SURGICAL HISTORY  2015    Bx to intestines (celiac dz)    PELVIC LAPAROSCOPY  , ,     WISDOM TOOTH EXTRACTION  2007       Her current meds are:    Current Outpatient Medications:     sertraline (ZOLOFT) 50 MG tablet, Take 1 tablet by mouth daily, Disp: 30 tablet, Rfl: 12    CALCIUM PO, Take by mouth daily, Disp: , Rfl:     ascorbic acid (VITAMIN C) 500 MG tablet, Take 500 mg by mouth daily, Disp: , Rfl:     vitamin D 25 MCG (1000 UT) CAPS, Take by mouth daily, Disp: , Rfl:     folic acid (FOLVITE) 509 MCG tablet, Take 800 mcg by mouth daily, Disp: , Rfl:     levothyroxine (SYNTHROID) 137 MCG tablet, Take by mouth every morning (before breakfast), Disp: , Rfl:     levothyroxine (SYNTHROID) 150 MCG tablet, Take 150 mcg by mouth every morning (before breakfast), Disp: , Rfl:     valACYclovir (VALTREX) 500 MG tablet, Take 500 mg by mouth 2 times daily, Disp: , Rfl:        PHYSICAL EXAM:  /76   Ht 5' 4\" (1.626 m)   Wt 225 lb 6.4 oz (102.2 kg)   BMI 38.69 kg/m²   Physical Exam   General: well nourished well developed female in nad     ASSESSMENT / PLAN:  Diagnoses and all orders for this visit:    Anxiety    Other orders  -     sertraline (ZOLOFT) 50 MG tablet; Take 1 tablet by mouth daily    continue zoloft.   rtc prn and for annual exam.         Tanvi Rose, DO

## 2022-06-03 NOTE — PERIOP NOTE
Patient verified name and . Order for consent IS NOT found in EHR; patient verifies procedure. Type 1b surgery, phone assessment complete. Orders not received. Labs per surgeon: none  Labs per anesthesia protocol: none    Patient answered medical/surgical history questions at their best of ability. All prior to admission medications documented in [facility]. Patient instructed to take the following medications the day of surgery according to anesthesia guidelines with a small sip of water: Synthroid   On the day before surgery please take Acetaminophen 1000mg in the morning and then again before bed. You may substitute for Tylenol 650 mg. Hold all vitamins 7 days prior to surgery and NSAIDS 5 days prior to surgery. Prescription meds to hold: none  Patient instructed on the following:    > Arrive at [email or web address], time of arrival to be called the day before by 1700  > NPO after midnight including gum, mints, and ice chips  > Responsible adult must drive patient to the hospital, stay during surgery, and patient will need supervision 24 hours after anesthesia  > Use antibacterial soap in shower the night before surgery and on the morning of surgery  > All piercings must be removed prior to arrival.    > Leave all valuables (money and jewelry) at home but bring insurance card and ID on DOS.   > You may be required to pay a deductible or co-pay on the day of your procedure. You can pre-pay by calling [phone] if your surgery is at the [facility] or [phone] if your surgery is at the [facility]. > Do not wear make-up, nail polish, lotions, cologne, perfumes, powders, or oil on skin. Artificial nails are not permitted.

## 2022-06-08 NOTE — H&P
Date: 22      Name: Isreal Paredes      MRN: [id number]       : 1985       Age: 40 y.o. Sex: female        [de-identified], Not On File, MD       CC:  No chief complaint on file. HPI:     Isreal Paredes is a 40 y.o. female who presents for evaluation of a ventral hernia as a referral from [facility]. The hernia has been present for several weeks. She had a hysterectomy, then one week later Dr. Rachel Flores performed a laparoscopic appendectomy through the same supraumbilical incision site. She has a protrusion at the site. She has occasional pain when lifting. Some nausea, but no vomiting or change in bowel habits. PMH:    Past Medical History:   Diagnosis Date    Abnormal Pap smear 2011 & 2015    HPV +    Abnormal Papanicolaou smear of cervix  &      2004    Anemia     Iron infusions in 2016; takes Iron daily     Anxiety     Asthma     childhood    BMI 37.0-37.9, adult     Celiac disease     Depression     no meds    Endometriosis     Female dyspareunia     Genital herpes     Herpes simplex without mention of complication     Type 2    High-risk pregnancy in second trimester 2019    Flu vaccine given 10/16/19  2019 at [facility]:  Normal NT, NB present. Genetic counseling done by physician. Pt declines testing. May lift up to 45# at work at this time; limit if concerns develop 2019 at [facility]:  Normal anatomy/fetal Echo. Normal CL at 4.3 cm. Hx of PTL and delivery at 35 weeks in 2 pregnancies prior to last Twin Pregnancy.  10/3/2019 at [facility]: Normal CL 3.7 cm, no funneling    History of hyperthyroidism 3/2014    treated w/radiation    Hypothyroidism     managed w/med    Infertility, female     Miscarriage     Pelvic pain in female 10/8/2015    Postpartum depression     with last pregnancy-twins     labor in third trimester without delivery 10/16/2017       PSH:    Past Surgical History:   Procedure Laterality Date    HX APPENDECTOMY 2021    HX BREAST AUGMENTATION  2008    HX  SECTION  10/16/2017,     Twins- Male    HX COLPOSCOPY      HX LEEP PROCEDURE      HX OTHER SURGICAL      Fulguration of Endometriosis    HX OTHER SURGICAL  2015    Bx to intestines (celiac dz)    HX PELVIC LAPAROSCOPY  , ,     HX WISDOM TEETH EXTRACTION  2007       MEDS:    Current Outpatient Medications   Medication Sig    levothyroxine (Synthroid) 137 mcg tablet Take  by mouth Daily (before breakfast). Friday-     sertraline (ZOLOFT) 50 mg tablet Take 1 Tablet by mouth daily.  levothyroxine (Synthroid) 150 mcg tablet Take 150 mcg by mouth Daily (before breakfast). Monday-Thursday  Indications: a condition with low thyroid hormone levels    valACYclovir (VALTREX) 500 mg tablet Take 1 Tablet by mouth two (2) times a day.  ascorbic acid, vitamin C, (VITAMIN C) 500 mg tablet Take 500 mg by mouth daily.  folic acid 147 mcg tablet Take 800 mcg by mouth daily.  cholecalciferol (VITAMIN D3) 1,000 unit cap Take  by mouth daily.  VITAMIN B COMPLEX NO.12-NIACIN PO Take  by mouth.  CALCIUM PO Take  by mouth daily.  FERROUS SULFATE (IRON PO) Take 1 Tablet by mouth daily. No current facility-administered medications for this visit.        ALLERGIES:      No Known Allergies    SH:    Social History     Tobacco Use    Smoking status: Never Smoker    Smokeless tobacco: Never Used   Vaping Use    Vaping Use: Never used   Substance Use Topics    Alcohol use: Yes     Comment: very rare     Drug use: No       FH:    Family History   Problem Relation Age of Onset    Stroke Maternal Uncle     Heart Disease Maternal Uncle     Thyroid Disease Maternal Uncle     Prostate Cancer Maternal Uncle     Stroke Maternal Grandfather     Cancer Maternal Grandfather         brain cancer    Ovarian Cancer Paternal Grandmother     Breast Cancer Paternal Grandmother     Alzheimer's Disease Paternal Grandmother     Breast Cancer Paternal Aunt         Twice    Depression Mother     Breast Cancer Mother     Stroke Maternal Grandmother     Heart Disease Paternal Grandfather     Thyroid Disease Other         hyperthyroidism    Thyroid Disease Other         hyperthyroidism    Heart defect Sister         Hole in Heart (closed)    Other Father         Cleft Palate       ROS: The patient has no difficulty with chest pain or shortness of breath. No fever or chills. Comprehensive 13 point review of systems was otherwise unremarkable except as noted above. Physical Exam:     Visit Vitals  LMP 11/01/2021       General: Alert, oriented, cooperative white female in no acute distress. Eyes: Sclera are clear. Conjunctiva and lids within normal limits. No icterus. Ears and Nose: no gross deformities to visual inspection, gross hearing intact  Neck: Supple, trachea midline, no appreciable thyromegaly  Resp: Breathing is  non-labored. Lungs clear to auscultation without wheezing or rhonchi   CV: RRR. No murmurs, rubs or gallops appreciated. Abd: soft, reducible ventral hernia to the left of the supraumbilical incision site as well as a small umbilical hernia that was reducible. Psych:  Mood and affect appropriate. Short-term memory and understanding intact      Assessment/Plan:  Ana Rosa Ryan is a 40 y.o. female who has signs and symptoms consistent with reducible ventral hernia. 1. Open ventral hernia repair with mesh. I went over the risks of bleeding, infection, anesthesia, injury to the small bowel, large bowel, bladder, actually any of the intraabdominal organs as well as the potential need to convert to an open procedure. Another potential problem mentioned was the risk of recurrence of the hernia. I went over the use of mesh. I discussed the importance of following the post-op instructions, particularly the lifting restrictions. The patient understood the risks and wished to proceed.     Mimi Arthur, MD    FACS   6/8/22

## 2022-06-09 ENCOUNTER — ANESTHESIA EVENT (OUTPATIENT)
Dept: SURGERY | Age: 37
End: 2022-06-09
Payer: COMMERCIAL

## 2022-06-10 ENCOUNTER — ANESTHESIA (OUTPATIENT)
Dept: SURGERY | Age: 37
End: 2022-06-10
Payer: COMMERCIAL

## 2022-06-10 ENCOUNTER — HOSPITAL ENCOUNTER (OUTPATIENT)
Age: 37
Setting detail: OUTPATIENT SURGERY
Discharge: HOME | End: 2022-06-10
Attending: SURGERY | Admitting: SURGERY
Payer: COMMERCIAL

## 2022-06-10 VITALS
DIASTOLIC BLOOD PRESSURE: 69 MMHG | HEIGHT: 64 IN | RESPIRATION RATE: 16 BRPM | OXYGEN SATURATION: 100 % | BODY MASS INDEX: 38.36 KG/M2 | SYSTOLIC BLOOD PRESSURE: 110 MMHG | HEART RATE: 66 BPM | WEIGHT: 224.7 LBS | TEMPERATURE: 98.3 F

## 2022-06-10 DIAGNOSIS — K43.9 VENTRAL HERNIA WITHOUT OBSTRUCTION OR GANGRENE: Primary | ICD-10-CM

## 2022-06-10 PROCEDURE — 2580000003 HC RX 258: Performed by: ANESTHESIOLOGY

## 2022-06-10 PROCEDURE — 88302 TISSUE EXAM BY PATHOLOGIST: CPT

## 2022-06-10 PROCEDURE — 7100000010 HC PHASE II RECOVERY - FIRST 15 MIN: Performed by: SURGERY

## 2022-06-10 PROCEDURE — 2709999900 HC NON-CHARGEABLE SUPPLY: Performed by: SURGERY

## 2022-06-10 PROCEDURE — 3700000000 HC ANESTHESIA ATTENDED CARE: Performed by: SURGERY

## 2022-06-10 PROCEDURE — 3600000003 HC SURGERY LEVEL 3 BASE: Performed by: SURGERY

## 2022-06-10 PROCEDURE — 7100000000 HC PACU RECOVERY - FIRST 15 MIN: Performed by: SURGERY

## 2022-06-10 PROCEDURE — 6360000002 HC RX W HCPCS: Performed by: ANESTHESIOLOGY

## 2022-06-10 PROCEDURE — 2500000003 HC RX 250 WO HCPCS: Performed by: NURSE ANESTHETIST, CERTIFIED REGISTERED

## 2022-06-10 PROCEDURE — 6360000002 HC RX W HCPCS: Performed by: SURGERY

## 2022-06-10 PROCEDURE — 7100000011 HC PHASE II RECOVERY - ADDTL 15 MIN: Performed by: SURGERY

## 2022-06-10 PROCEDURE — 6370000000 HC RX 637 (ALT 250 FOR IP): Performed by: ANESTHESIOLOGY

## 2022-06-10 PROCEDURE — 49560 PR REPAIR INCISIONAL HERNIA,REDUCIBLE: CPT | Performed by: SURGERY

## 2022-06-10 PROCEDURE — 3700000001 HC ADD 15 MINUTES (ANESTHESIA): Performed by: SURGERY

## 2022-06-10 PROCEDURE — 2500000003 HC RX 250 WO HCPCS: Performed by: SURGERY

## 2022-06-10 PROCEDURE — 6360000002 HC RX W HCPCS: Performed by: NURSE ANESTHETIST, CERTIFIED REGISTERED

## 2022-06-10 PROCEDURE — C1781 MESH (IMPLANTABLE): HCPCS | Performed by: SURGERY

## 2022-06-10 PROCEDURE — 3600000013 HC SURGERY LEVEL 3 ADDTL 15MIN: Performed by: SURGERY

## 2022-06-10 PROCEDURE — 7100000001 HC PACU RECOVERY - ADDTL 15 MIN: Performed by: SURGERY

## 2022-06-10 DEVICE — MESH HERN L DIA8CM VENTRAL POLYPR EPTFE CIR SELF EXP PTCH: Type: IMPLANTABLE DEVICE | Site: ABDOMEN | Status: FUNCTIONAL

## 2022-06-10 RX ORDER — SODIUM CHLORIDE 0.9 % (FLUSH) 0.9 %
5-40 SYRINGE (ML) INJECTION PRN
Status: DISCONTINUED | OUTPATIENT
Start: 2022-06-10 | End: 2022-06-10 | Stop reason: HOSPADM

## 2022-06-10 RX ORDER — FENTANYL CITRATE 50 UG/ML
INJECTION, SOLUTION INTRAMUSCULAR; INTRAVENOUS PRN
Status: DISCONTINUED | OUTPATIENT
Start: 2022-06-10 | End: 2022-06-10 | Stop reason: SDUPTHER

## 2022-06-10 RX ORDER — ROCURONIUM BROMIDE 10 MG/ML
INJECTION, SOLUTION INTRAVENOUS PRN
Status: DISCONTINUED | OUTPATIENT
Start: 2022-06-10 | End: 2022-06-10 | Stop reason: SDUPTHER

## 2022-06-10 RX ORDER — BUPIVACAINE HYDROCHLORIDE 5 MG/ML
INJECTION, SOLUTION EPIDURAL; INTRACAUDAL PRN
Status: DISCONTINUED | OUTPATIENT
Start: 2022-06-10 | End: 2022-06-10 | Stop reason: ALTCHOICE

## 2022-06-10 RX ORDER — DIPHENHYDRAMINE HYDROCHLORIDE 50 MG/ML
12.5 INJECTION INTRAMUSCULAR; INTRAVENOUS
Status: DISCONTINUED | OUTPATIENT
Start: 2022-06-10 | End: 2022-06-10 | Stop reason: HOSPADM

## 2022-06-10 RX ORDER — ACETAMINOPHEN 500 MG
1000 TABLET ORAL ONCE
Status: COMPLETED | OUTPATIENT
Start: 2022-06-10 | End: 2022-06-10

## 2022-06-10 RX ORDER — SODIUM CHLORIDE 9 MG/ML
INJECTION, SOLUTION INTRAVENOUS PRN
Status: DISCONTINUED | OUTPATIENT
Start: 2022-06-10 | End: 2022-06-10 | Stop reason: HOSPADM

## 2022-06-10 RX ORDER — PROCHLORPERAZINE EDISYLATE 5 MG/ML
5 INJECTION INTRAMUSCULAR; INTRAVENOUS
Status: DISCONTINUED | OUTPATIENT
Start: 2022-06-10 | End: 2022-06-10 | Stop reason: HOSPADM

## 2022-06-10 RX ORDER — ONDANSETRON 2 MG/ML
4 INJECTION INTRAMUSCULAR; INTRAVENOUS
Status: DISCONTINUED | OUTPATIENT
Start: 2022-06-10 | End: 2022-06-10 | Stop reason: HOSPADM

## 2022-06-10 RX ORDER — OXYCODONE HYDROCHLORIDE AND ACETAMINOPHEN 5; 325 MG/1; MG/1
1 TABLET ORAL EVERY 6 HOURS PRN
Qty: 20 TABLET | Refills: 0 | Status: SHIPPED | OUTPATIENT
Start: 2022-06-10 | End: 2022-06-15

## 2022-06-10 RX ORDER — ONDANSETRON 2 MG/ML
INJECTION INTRAMUSCULAR; INTRAVENOUS PRN
Status: DISCONTINUED | OUTPATIENT
Start: 2022-06-10 | End: 2022-06-10 | Stop reason: SDUPTHER

## 2022-06-10 RX ORDER — HYDROMORPHONE HYDROCHLORIDE 1 MG/ML
0.5 INJECTION, SOLUTION INTRAMUSCULAR; INTRAVENOUS; SUBCUTANEOUS EVERY 5 MIN PRN
Status: DISCONTINUED | OUTPATIENT
Start: 2022-06-10 | End: 2022-06-10 | Stop reason: HOSPADM

## 2022-06-10 RX ORDER — HYDROMORPHONE HYDROCHLORIDE 1 MG/ML
0.25 INJECTION, SOLUTION INTRAMUSCULAR; INTRAVENOUS; SUBCUTANEOUS EVERY 5 MIN PRN
Status: DISCONTINUED | OUTPATIENT
Start: 2022-06-10 | End: 2022-06-10 | Stop reason: HOSPADM

## 2022-06-10 RX ORDER — EPHEDRINE SULFATE/0.9% NACL/PF 50 MG/5 ML
SYRINGE (ML) INTRAVENOUS PRN
Status: DISCONTINUED | OUTPATIENT
Start: 2022-06-10 | End: 2022-06-10 | Stop reason: SDUPTHER

## 2022-06-10 RX ORDER — KETOROLAC TROMETHAMINE 30 MG/ML
INJECTION, SOLUTION INTRAMUSCULAR; INTRAVENOUS PRN
Status: DISCONTINUED | OUTPATIENT
Start: 2022-06-10 | End: 2022-06-10 | Stop reason: SDUPTHER

## 2022-06-10 RX ORDER — LIDOCAINE HYDROCHLORIDE 20 MG/ML
INJECTION, SOLUTION EPIDURAL; INFILTRATION; INTRACAUDAL; PERINEURAL PRN
Status: DISCONTINUED | OUTPATIENT
Start: 2022-06-10 | End: 2022-06-10 | Stop reason: SDUPTHER

## 2022-06-10 RX ORDER — DEXAMETHASONE SODIUM PHOSPHATE 10 MG/ML
INJECTION INTRAMUSCULAR; INTRAVENOUS PRN
Status: DISCONTINUED | OUTPATIENT
Start: 2022-06-10 | End: 2022-06-10 | Stop reason: SDUPTHER

## 2022-06-10 RX ORDER — SODIUM CHLORIDE, SODIUM LACTATE, POTASSIUM CHLORIDE, CALCIUM CHLORIDE 600; 310; 30; 20 MG/100ML; MG/100ML; MG/100ML; MG/100ML
INJECTION, SOLUTION INTRAVENOUS CONTINUOUS
Status: DISCONTINUED | OUTPATIENT
Start: 2022-06-10 | End: 2022-06-10 | Stop reason: HOSPADM

## 2022-06-10 RX ORDER — SODIUM CHLORIDE 0.9 % (FLUSH) 0.9 %
5-40 SYRINGE (ML) INJECTION EVERY 12 HOURS SCHEDULED
Status: DISCONTINUED | OUTPATIENT
Start: 2022-06-10 | End: 2022-06-10 | Stop reason: HOSPADM

## 2022-06-10 RX ORDER — SODIUM CHLORIDE, SODIUM LACTATE, POTASSIUM CHLORIDE, CALCIUM CHLORIDE 600; 310; 30; 20 MG/100ML; MG/100ML; MG/100ML; MG/100ML
125 INJECTION, SOLUTION INTRAVENOUS CONTINUOUS
Status: DISCONTINUED | OUTPATIENT
Start: 2022-06-10 | End: 2022-06-10 | Stop reason: HOSPADM

## 2022-06-10 RX ORDER — OXYCODONE HYDROCHLORIDE 5 MG/1
10 TABLET ORAL PRN
Status: COMPLETED | OUTPATIENT
Start: 2022-06-10 | End: 2022-06-10

## 2022-06-10 RX ORDER — OXYCODONE HYDROCHLORIDE 5 MG/1
5 TABLET ORAL PRN
Status: COMPLETED | OUTPATIENT
Start: 2022-06-10 | End: 2022-06-10

## 2022-06-10 RX ORDER — NEOSTIGMINE METHYLSULFATE 1 MG/ML
INJECTION, SOLUTION INTRAVENOUS PRN
Status: DISCONTINUED | OUTPATIENT
Start: 2022-06-10 | End: 2022-06-10 | Stop reason: SDUPTHER

## 2022-06-10 RX ORDER — LIDOCAINE HYDROCHLORIDE 10 MG/ML
1 INJECTION, SOLUTION EPIDURAL; INFILTRATION; INTRACAUDAL; PERINEURAL
Status: DISCONTINUED | OUTPATIENT
Start: 2022-06-10 | End: 2022-06-10 | Stop reason: HOSPADM

## 2022-06-10 RX ORDER — PROPOFOL 10 MG/ML
INJECTION, EMULSION INTRAVENOUS PRN
Status: DISCONTINUED | OUTPATIENT
Start: 2022-06-10 | End: 2022-06-10 | Stop reason: SDUPTHER

## 2022-06-10 RX ORDER — GLYCOPYRROLATE 0.2 MG/ML
INJECTION INTRAMUSCULAR; INTRAVENOUS PRN
Status: DISCONTINUED | OUTPATIENT
Start: 2022-06-10 | End: 2022-06-10 | Stop reason: SDUPTHER

## 2022-06-10 RX ORDER — MIDAZOLAM HYDROCHLORIDE 2 MG/2ML
2 INJECTION, SOLUTION INTRAMUSCULAR; INTRAVENOUS
Status: COMPLETED | OUTPATIENT
Start: 2022-06-10 | End: 2022-06-10

## 2022-06-10 RX ADMIN — HYDROMORPHONE HYDROCHLORIDE 0.5 MG: 1 INJECTION, SOLUTION INTRAMUSCULAR; INTRAVENOUS; SUBCUTANEOUS at 10:00

## 2022-06-10 RX ADMIN — ONDANSETRON 4 MG: 2 INJECTION INTRAMUSCULAR; INTRAVENOUS at 09:32

## 2022-06-10 RX ADMIN — KETOROLAC TROMETHAMINE 30 MG: 30 INJECTION, SOLUTION INTRAMUSCULAR; INTRAVENOUS at 09:50

## 2022-06-10 RX ADMIN — MIDAZOLAM HYDROCHLORIDE 2 MG: 2 INJECTION, SOLUTION INTRAMUSCULAR; INTRAVENOUS at 08:57

## 2022-06-10 RX ADMIN — LIDOCAINE HYDROCHLORIDE 100 MG: 20 INJECTION, SOLUTION EPIDURAL; INFILTRATION; INTRACAUDAL; PERINEURAL at 09:09

## 2022-06-10 RX ADMIN — GLYCOPYRROLATE 0.7 MG: 0.2 INJECTION, SOLUTION INTRAMUSCULAR; INTRAVENOUS at 09:49

## 2022-06-10 RX ADMIN — PROPOFOL 200 MG: 10 INJECTION, EMULSION INTRAVENOUS at 09:10

## 2022-06-10 RX ADMIN — Medication 2000 MG: at 09:17

## 2022-06-10 RX ADMIN — SODIUM CHLORIDE, POTASSIUM CHLORIDE, SODIUM LACTATE AND CALCIUM CHLORIDE: 600; 310; 30; 20 INJECTION, SOLUTION INTRAVENOUS at 07:55

## 2022-06-10 RX ADMIN — FENTANYL CITRATE 100 MCG: 50 INJECTION INTRAMUSCULAR; INTRAVENOUS at 09:06

## 2022-06-10 RX ADMIN — ROCURONIUM BROMIDE 40 MG: 50 INJECTION, SOLUTION INTRAVENOUS at 09:10

## 2022-06-10 RX ADMIN — OXYCODONE 10 MG: 5 TABLET ORAL at 10:03

## 2022-06-10 RX ADMIN — ACETAMINOPHEN 1000 MG: 500 TABLET, FILM COATED ORAL at 07:50

## 2022-06-10 RX ADMIN — Medication 10 MG: at 09:37

## 2022-06-10 RX ADMIN — Medication 5 MG: at 09:49

## 2022-06-10 RX ADMIN — DEXAMETHASONE SODIUM PHOSPHATE 6 MG: 10 INJECTION INTRAMUSCULAR; INTRAVENOUS at 09:32

## 2022-06-10 ASSESSMENT — PAIN SCALES - GENERAL
PAINLEVEL_OUTOF10: 8
PAINLEVEL_OUTOF10: 8

## 2022-06-10 ASSESSMENT — PAIN DESCRIPTION - LOCATION
LOCATION: ABDOMEN
LOCATION: ABDOMEN

## 2022-06-10 ASSESSMENT — PAIN DESCRIPTION - DESCRIPTORS
DESCRIPTORS: SORE
DESCRIPTORS: SORE

## 2022-06-10 ASSESSMENT — PAIN DESCRIPTION - ORIENTATION
ORIENTATION: LOWER
ORIENTATION: LOWER

## 2022-06-10 ASSESSMENT — PAIN - FUNCTIONAL ASSESSMENT: PAIN_FUNCTIONAL_ASSESSMENT: 0-10

## 2022-06-10 NOTE — OP NOTE
[place]  OPERATIVE REPORT    Name:  Osmani Carrasco  MR#:  [id number]  :  1985  ACCOUNT #:  [de-identified]  DATE OF SERVICE:  06/10/2022    PREOPERATIVE DIAGNOSIS:  Ventral hernia. POSTOPERATIVE DIAGNOSIS:  Ventral and umbilical hernia. PROCEDURE PERFORMED:  Open ventral hernia repair with mesh using a large Ventralex mesh. SURGEON:  Fracisco Mak. Meaghan Beckman MD    ASSISTANT:  None. ANESTHESIA:  General endotracheal anesthesia plus 30 mL of 0.5% Marcaine used as local at the end of the procedure. COMPLICATIONS:  None. SPECIMENS REMOVED:  Ventral hernia sac sent to Pathology. IMPLANTS:  None. ESTIMATED BLOOD LOSS:  25 mL. DRAINS:  None. HISTORY:  This is a 40-year-old female referred by [facility] for a ventral hernia. The patient had a laparoscopic appendectomy done through a supraumbilical site on , with Dr. Monica Bermudez.  The patient has been having pain at the site for the last 3-4 months. She went to [facility], was diagnosed with a hernia and referred back to [address] at the [place]. Dr. Monica Bermudez is no longer with our practice, so I saw her in his absence. The patient was found to have an umbilical hernia as well as a ventral hernia where Dr. Monica Bermudez had made a supraumbilical incision to get access to her abdominal cavity to perform the appendectomy. Both the hernias were reducible. She was scheduled for a repair today. I went through the use of mesh, the potential injury to the small bowel, large bowel, recurrence of the hernia as well as the generalized risks of bleeding, infection, anesthesia, DVT, pulmonary emboli, wound infection, wound dehiscence. I did highlight that we would use piece of mesh and that recurrence rates for these procedures was anywhere from 5-10%. She was agreeable, signed a consent form, was scheduled for today.     PROCEDURE:  The patient was seen in the preop area and then transported to room #6 [facility].  She was placed on the operating room table in room 6 in the supine position. General endotracheal anesthesia was administered without complications. Sequential compression devices were placed and used throughout the procedure. She received 600 mg of Cleocin as prophylactic antibiotic coverage. The abdomen was prepped and draped in the usual sterile manner. I did a time-out identifying the patient, surgeon, procedure, and her birth date of 1985. Once everyone agreed with the time-out, I began the procedure. She had a supraumbilical incision and a palpable hernia below this, so I extended this down through the upper portion of the umbilicus with a 15 scalpel blade. Soft tissue was divided with electrocautery. I found she had an umbilical hernia as well as a fairly large ventral hernia in the supraumbilical region. I got into the abdominal cavity by dividing the midline fascia. I took down adhesions between the ventral hernia sac and the omentum as well as between the omentum and the umbilical hernia sac. I excised the ventral hernia sac and sent it to Pathology for evaluation. I excised the umbilical hernia sac, connected these two hernias. Once all the adhesions had been taken down, I raised flaps circumferentially on the fascia between the fascia and the anterior abdominal wall fat. This would allow me to place sutures to close this. The defect was fairly large. I took a large Ventralex piece of mesh and opened it up on the back table, then placed it into the wound. I sutured the fascia shut tacking the mesh to it using a combination of horizontal mattress sutures of 0 Prolene and simple sutures of 0 Prolene. The mesh was lying in good position. The PTFE side was down towards the omentum, the polypropylene side was up against the anterior abdominal wall to promote ingrowth into this portion of the mesh. The fascial defect was completely closed.   I irrigated the wound.  Hemostasis was achieved with electrocautery. Total blood loss for the procedure was 25 mL. Lap and instrument counts were done at this point and found to be correct x2. I brought the adipose tissue back down to the underlying fascia with some interrupted sutures of 0 Vicryl. The skin was closed with surgical clips. I injected 30 mL of 0.5% Marcaine around the wound site for local anesthetic effect. The patient tolerated the procedure well, was extubated, brought to recovery room in stable condition, should be able to go home later today.       Emre Moser MD      [id number]  D:  06/10/2022 10:21  T:  06/10/2022 14:20  JOB #:  [id number]

## 2022-06-10 NOTE — ANESTHESIA PRE PROCEDURE
Department of Anesthesiology  Preprocedure Note       Name:  Lior Paredes   Age:  40 y.o.  :  1985                                          MRN:  [id number]         Date:  6/10/2022      Surgeon: Demetra Payton):  Tabby Song MD    Procedure: Procedure(s): HERNIA VENTRAL REPAIR WIHT MESH    Medications prior to admission:   Prior to Admission medications    Medication Sig Start Date End Date Taking?  Authorizing Provider   sertraline (ZOLOFT) 50 MG tablet Take 1 tablet by mouth daily 22   Tanvi K Alt, DO   CALCIUM PO Take by mouth daily    Ar Automatic Reconciliation   ascorbic acid (VITAMIN C) 500 MG tablet Take 500 mg by mouth daily    Ar Automatic Reconciliation   vitamin D 25 MCG (1000 UT) CAPS Take by mouth daily    Ar Automatic Reconciliation   folic acid (FOLVITE) 018 MCG tablet Take 800 mcg by mouth daily    Ar Automatic Reconciliation   levothyroxine (SYNTHROID) 137 MCG tablet Take by mouth every morning (before breakfast)    Ar Automatic Reconciliation   levothyroxine (SYNTHROID) 150 MCG tablet Take 150 mcg by mouth every morning (before breakfast)    Ar Automatic Reconciliation   valACYclovir (VALTREX) 500 MG tablet Take 500 mg by mouth 2 times daily 21   Ar Automatic Reconciliation       Current medications:    Current Facility-Administered Medications   Medication Dose Route Frequency Provider Last Rate Last Admin    ceFAZolin (ANCEF) 2000 mg in sterile water 20 mL IV syringe  2,000 mg IntraVENous Once Tabby Song MD        lidocaine PF 1 % injection 1 mL  1 mL IntraDERmal Once PRN Yoly Blankenship IV, MD        lactated ringers infusion   IntraVENous Continuous Yoly Blankenship IV,  mL/hr at 06/10/22 0755 New Bag at 06/10/22 0755    sodium chloride flush 0.9 % injection 5-40 mL  5-40 mL IntraVENous 2 times per day Yoly Blankenship IV, MD        sodium chloride flush 0.9 % injection 5-40 mL  5-40 mL IntraVENous PRN Yoly Blankenship IV, MD        0.9 % sodium chloride infusion   IntraVENous PRN Jeremy Saavedra MD        midazolam PF (VERSED) injection 2 mg  2 mg IntraVENous Once PRN Monty Hernandez IV, MD           Allergies:  No Known Allergies    Problem List:    Patient Active Problem List   Diagnosis Code    Encounter for immunization Z23    Acute appendicitis K35.80    Postpartum care and examination Z39.2    Severe obesity ([facility] [address] E66.01       Past Medical History:        Diagnosis Date    Abnormal Pap smear 2011 & 2015    HPV +    Abnormal Papanicolaou smear of cervix  &      2004    Anemia     in the past    Anxiety     Asthma     childhood, exercise induced as an adult    BMI 37.0-37.9, adult     Celiac disease     Depression     no meds    Endometriosis     Female dyspareunia     Genital herpes     Herpes simplex without mention of complication     Type 2    High-risk pregnancy in second trimester 2019    Flu vaccine given 10/16/19  2019 at [facility]:  Normal NT, NB present. Genetic counseling done by physician. Pt declines testing. May lift up to 45# at work at this time; limit if concerns develop 2019 at [facility]:  Normal anatomy/fetal Echo. Normal CL at 4.3 cm. Hx of PTL and delivery at 35 weeks in 2 pregnancies prior to last Twin Pregnancy.  10/3/2019 at [facility]: Normal CL 3.7 cm, no funneling    History of hyperthyroidism 3/2014    treated w/radiation    Hypothyroidism     managed w/med    Infertility, female     Miscarriage     Pelvic pain in female 10/8/2015    Postpartum depression     with last pregnancy-twins     labor in third trimester without delivery 10/16/2017       Past Surgical History:        Procedure Laterality Date    APPENDECTOMY  2021    BREAST SURGERY  2008     SECTION  10/16/2017,     Twins- Male    COLPOSCOPY      HYSTERECTOMY  2021    LEECASSIE      OTHER SURGICAL HISTORY      Fulguration of Endometriosis    OTHER SURGICAL HISTORY  2015    Bx to intestines (celiac dz)    PELVIC LAPAROSCOPY  2006, 2011, 2015    WISDOM TOOTH EXTRACTION  4/2007       Social History:    Social History     Tobacco Use    Smoking status: Never Smoker    Smokeless tobacco: Never Used   Substance Use Topics    Alcohol use: Not Currently                                Counseling given: Not Answered      Vital Signs (Current):   Vitals:    06/03/22 0958 06/10/22 0739   BP:  110/76   Pulse:  65   Resp:  16   Temp:  97.3 °F (36.3 °C)   TempSrc:  Tympanic   Weight: 220 lb (99.8 kg) 224 lb 11.2 oz (101.9 kg)   Height: 5' 4\" (1.626 m)                                               BP Readings from Last 3 Encounters:   06/10/22 110/76   05/25/22 112/76   05/16/22 108/73       NPO Status: Time of last liquid consumption: 2100                        Time of last solid consumption: 2100                        Date of last liquid consumption: 06/09/22                        Date of last solid food consumption: 06/09/22    BMI:   Wt Readings from Last 3 Encounters:   06/10/22 224 lb 11.2 oz (101.9 kg)   05/25/22 225 lb 6.4 oz (102.2 kg)   05/16/22 221 lb 8 oz (100.5 kg)     Body mass index is 38.57 kg/m². CBC:   Lab Results   Component Value Date    WBC 14.1 12/15/2021    RBC 4.81 12/15/2021    HGB 14.7 12/15/2021    HCT 43.5 12/15/2021    MCV 90.4 12/15/2021    RDW 12.1 12/15/2021     12/15/2021       CMP:   Lab Results   Component Value Date     12/15/2021    K 3.9 12/15/2021     12/15/2021    CO2 30 12/15/2021    BUN 14 12/15/2021    CREATININE 0.70 12/15/2021    GFRAA >60 12/15/2021    GLUCOSE 105 12/15/2021    CALCIUM 9.2 12/15/2021       POC Tests: No results for input(s): POCGLU, POCNA, POCK, POCCL, POCBUN, POCHEMO, POCHCT in the last 72 hours.     Coags: No results found for: PROTIME, INR, APTT    HCG (If Applicable): No results found for: PREGTESTUR, PREGSERUM, HCG, HCGQUANT     ABGs:   Lab Results   Component Value Date    YIU3YJY 27.0 01/23/2020    BEART 0 01/23/2020        Type & Screen (If Applicable):  No results found for: LABABO, LABRH    Drug/Infectious Status (If Applicable):  No results found for: HIV, HEPCAB    COVID-19 Screening (If Applicable):   Lab Results   Component Value Date    COVID19 Not detected 12/16/2021    COVID19 Not Detected 12/03/2021    COVID19 Performed 12/03/2021           Anesthesia Evaluation  Patient summary reviewed no history of anesthetic complications:   Airway: Mallampati: I     Neck ROM: full  Mouth opening: > = 3 FB   Dental:          Pulmonary: breath sounds clear to auscultation  (+) asthma: exercise-induced asthma,                            Cardiovascular:Negative CV ROS  Exercise tolerance: good (>4 METS),           Rhythm: regular  Rate: normal                    Neuro/Psych:   Negative Neuro/Psych ROS  (+) depression/anxiety             GI/Hepatic/Renal: Neg GI/Hepatic/Renal ROS           ROS comment: Celiac disease. Endo/Other: Negative Endo/Other ROS   (+) hypothyroidism::., .                 Abdominal:             Vascular: negative vascular ROS. Other Findings:           Anesthesia Plan      general     ASA 2           MIPS: Postoperative opioids intended and Prophylactic antiemetics administered. Anesthetic plan and risks discussed with patient. Use of blood products discussed with whom consented to blood products.                      John Villagran MD   6/10/2022

## 2022-06-10 NOTE — PERIOP NOTE
Discharge instructions reviewd with patient and spouse.  Patient and family member state understanding

## 2022-06-10 NOTE — ANESTHESIA POSTPROCEDURE EVALUATION
Department of Anesthesiology  Postprocedure Note    Patient: Nadeem Domínguez  MRN: [id number]  YOB: 1985  Date of evaluation: 6/10/2022  Time:  1:07 PM     Procedure Summary     Date: 06/10/22 [place] [facility] [place] [facility] / [facility] [place]    Anesthesia Start: [address] Anesthesia Stop: 5452    Procedure: HERNIA VENTRAL REPAIR WIHT MESH (N/A Abdomen) Diagnosis:       Ventral hernia without obstruction or gangrene      (J707)    Surgeons: Jamie Nails MD Responsible Provider: Mira Cloud MD    Anesthesia Type: General ASA Status: 2          Anesthesia Type: General    Colby Phase I: Colby Score: 10    Colby Phase II: Colby Score: 10    Last vitals: Reviewed and per EMR flowsheets.        Anesthesia Post Evaluation    Patient location during evaluation: PACU  Patient participation: complete - patient participated  Level of consciousness: awake and awake and alert  Airway patency: patent  Nausea & Vomiting: no nausea  Complications: no  Cardiovascular status: hemodynamically stable  Hydration status: euvolemic

## 2022-06-10 NOTE — INTERVAL H&P NOTE
Update History & Physical    The patient's History and Physical of 6/8/22 was reviewed with the patient and I examined the patient. There was no change. The surgical site was confirmed by the patient and me. Plan: The risks, benefits, expected outcome, and alternative to the recommended procedure have been discussed with the patient. Patient understands and wants to proceed with the procedure.      Electronically signed by Johnny Santiago MD on 6/10/2022 at 7:26 AM

## 2022-06-10 NOTE — BRIEF OP NOTE
Brief Postoperative Note      Patient: Oswaldo Craig  YOB: 1985  MRN: [id number]    Date of Procedure: 6/10/2022    Pre-Op Diagnosis: VENTRAL HERNIA    Post-Op Diagnosis: SAME       Procedure: OPEN VENTRAL HERNIA REPAIR WITH MESH    Surgeon(s):  Marcela Castro MD    Assistant:  * No surgical staff found *    Anesthesia: General plus local    Estimated Blood Loss (mL): less than 50     Complications: None    Specimens:   ID Type Source Tests Collected by Time Destination   A : ventral hernia sac  Tissue Abdomen SURGICAL PATHOLOGY Marcela Castro MD 6/10/2022 9192        Implants:  Implant Name Type Inv. Item Serial No.  Lot No. LRB No. Used Action   MESH KAMARI L DIA8CM VENTRAL POLYPR EPTFE CIR SELF EXP PTCH - [id number]  MESH KAMARI L DIA8CM VENTRAL POLYPR EPTFE CIR SELF EXP PTCH [id number] BARD DAVOL-WD [id number] N/A 1 Implanted         Drains: * No LDAs found *    Findings: See dictated note.     Electronically signed by Tamara Thakkar MD on 6/10/2022 at 9:56 AM

## 2022-06-22 ENCOUNTER — OFFICE VISIT (OUTPATIENT)
Dept: SURGERY | Age: 37
End: 2022-06-22

## 2022-06-22 DIAGNOSIS — K43.9 VENTRAL HERNIA WITHOUT OBSTRUCTION OR GANGRENE: ICD-10-CM

## 2022-06-22 DIAGNOSIS — Z09 POSTOPERATIVE EXAMINATION: Primary | ICD-10-CM

## 2022-06-22 PROCEDURE — 99024 POSTOP FOLLOW-UP VISIT: CPT

## 2022-06-22 NOTE — PROGRESS NOTES
[address] [facility]  [phone]        poDate: 2022      Name: Isreal Paredes      MRN: [id number]       : 1985       Age: 40 y.o. Sex: female        PROVIDER UNKNOWN, MD       CC:    Chief Complaint   Patient presents with    Post-Op Check     GPO 6/10/22 Open VHR w/Mesh       HPI:  The patient presents for a post-op visit s/p Open ventral hernia repair with mesh using a large Ventralex mesh. Hitesh [facility], MD 6/10/22      Physical Exam:     There were no vitals taken for this visit. General: Alert, oriented, cooperative and in no acute distress. Neck: Supple, trachea midline, no appreciable thyromegaly  Resp: No JVD. Breathing is  non-labored. Lungs clear to auscultation without wheezing or rhonchi   CV: RRR. No murmurs, rubs or gallops appreciated. Abd: soft non-tender and non-distended without peritoneal signs. +bs    Skin/incision: midline abd staples removed and steri strips placed. No signs of infection Clean, dry and intact. No fluid collection palpated. Name:    SUYAPA PARADAZABETH LOUISMary Accession Number:   [id number]   MR #   [id number]   Date Obtained:   6/10/2022   DIAGNOSIS        \"VENTRAL HERNIA SAC\":             BENIGN FIBROADIPOSE TISSUE CONSISTENT WITH HERNIA SAC.           [facility] [place]   Electronically Signed Out         Sign Out Date: 2022 Dagmar Mitchell MD       Microscopic Description        Microscopic examination has been performed and the results are   incorporated in the above diagnosis.      Specimen(s) Received   A: Ventral Hernia Sac       Clinical History        Macroscopic Description        Larissa Parada, received in formalin labeled ventral hernia sac and   specimen consists of an unoriented fragment of fibroadipose tissue   measuring 3.9 x 3 x 2 cm.  Representative sections are submitted in one   cassette.     [facility] [place]/6/10/2022   Adriana Light   Assessment/Plan:  Isreal Paredes is a 40 y.o. female who is s/p Open ventral hernia repair with mesh using a large Ventralex mesh. Justina [facility], MD 6/10/22    1. Follow-up as needed    2. Lifting restriction of nothing heavier than 25 lbs for 4 weeks after surgery. Weeks 4-6 slowly begin to increase lifting weight. In 6 weeks, Return to full activity    3.  Regular diet    Signed: KAILA Webster NP    6/22/2022  8:27 AM

## 2022-06-27 ENCOUNTER — OFFICE VISIT (OUTPATIENT)
Dept: SURGERY | Age: 37
End: 2022-06-27

## 2022-06-27 DIAGNOSIS — T81.49XA WOUND INFECTION AFTER SURGERY: Primary | ICD-10-CM

## 2022-06-27 RX ORDER — SULFAMETHOXAZOLE AND TRIMETHOPRIM 800; 160 MG/1; MG/1
1 TABLET ORAL 2 TIMES DAILY
Qty: 14 TABLET | Refills: 0 | Status: SHIPPED | OUTPATIENT
Start: 2022-06-27 | End: 2022-07-04

## 2022-06-27 NOTE — PROGRESS NOTES
Patient presents today following an open VHR with mesh. There is one visible staple inside the umbilicus, it was removed. The patient tolerated well. The incision has opened. The patient was instructed to keep the area clean, dry and covered. Bactrim x 7 days was sent to the [facility] pharmacy. I covered the area with gauze. She denies pain, fevers, chills, or n/v. The patient was seen by Edilberto Guardado as a nurse visit. The patient did not see Dr. Prosper Cardozo.

## 2022-06-30 ENCOUNTER — OFFICE VISIT (OUTPATIENT)
Dept: SURGERY | Age: 37
End: 2022-06-30

## 2022-06-30 DIAGNOSIS — K43.9 VENTRAL HERNIA WITHOUT OBSTRUCTION OR GANGRENE: Primary | ICD-10-CM

## 2022-06-30 DIAGNOSIS — T81.49XA WOUND INFECTION AFTER SURGERY: ICD-10-CM

## 2022-06-30 PROCEDURE — 99024 POSTOP FOLLOW-UP VISIT: CPT | Performed by: SURGERY

## 2022-06-30 NOTE — PROGRESS NOTES
poDate: 2022      Name: Cecile Childers      MRN: [id number]       : 1985       Age: 40 y.o. Sex: female        PROVIDER UNKNOWN, MD       CC:    Chief Complaint   Patient presents with    Post-Op Check     s/p 6/10/2022 open VHR with mesh        HPI:  The patient presents for the second post-op visit s/p open ventral hernia repair done on 6/10/22. The patient was seen on  by Angie Hui NP who removed all of her staples. She developed redness around the wound for which I sent in Bactrim. She has a small opening of her wound with \"brown stuff\" showing. No fever or chills. Physical Exam:     There were no vitals taken for this visit. General: Alert, oriented, cooperative white female in no acute distress. Neck: Supple, trachea midline, no appreciable thyromegaly  Resp: Breathing is  non-labored. Lungs clear to auscultation without wheezing or rhonchi   CV: RRR. No murmurs, rubs or gallops appreciated. Abd: soft, small open area of midline wound above umbilicus. Fibrinous exudate present. No cellulitis. No drainage. Assessment/Plan:  Cecile Childers is a 40 y.o. female who is s/p open ventral hernia repair done on 6/10/22. 1. Silver nitrate sticks to open area    2. Finish course of Bactrim    3. Local wound care    4. Follow-up in one week.     Johnny Santiago MD  [facility]   2022  1:49 PM

## 2022-07-04 NOTE — PROGRESS NOTES
poDate: 2022      Name: Yue Longo      MRN: [id number]       : 1985       Age: 40 y.o. Sex: female        PROVIDER UNKNOWN, MD       CC:    No chief complaint on file. HPI:  The patient presents for the third post-op visit s/p open ventral hernia repair done on 6/10/22. The patient was seen on  by Francisco Lyons NP who removed all of her staples. She developed cellulitis and a small opening which I cauterized with silver nitrate sticks on 22. She was given a prescription for Bactrim. No new problems. Physical Exam:     There were no vitals taken for this visit. General: Alert, oriented, cooperative white female in no acute distress. Neck: Supple, trachea midline, no appreciable thyromegaly  Resp: Breathing is  non-labored. Lungs clear to auscultation without wheezing or rhonchi   CV: RRR. No murmurs, rubs or gallops appreciated. Abd: soft, small open area of midline wound above umbilicus. Cellulitis gone    Assessment/Plan:  Yue Longo is a 40 y.o. female who is s/p open ventral hernia repair done on 6/10/22. 1. Silver nitrate sticks to open area    2. Finish course of Bactrim    3. Local wound care    4. Follow-up in one week.     Baldo Almeida MD  [facility]   2022  9:07 AM

## 2022-07-07 ENCOUNTER — OFFICE VISIT (OUTPATIENT)
Dept: SURGERY | Age: 37
End: 2022-07-07

## 2022-07-07 DIAGNOSIS — T81.49XA WOUND INFECTION AFTER SURGERY: ICD-10-CM

## 2022-07-07 DIAGNOSIS — K43.9 VENTRAL HERNIA WITHOUT OBSTRUCTION OR GANGRENE: Primary | ICD-10-CM

## 2022-07-07 PROCEDURE — 99024 POSTOP FOLLOW-UP VISIT: CPT | Performed by: SURGERY

## 2022-07-19 ENCOUNTER — OFFICE VISIT (OUTPATIENT)
Dept: SURGERY | Age: 37
End: 2022-07-19

## 2022-07-19 DIAGNOSIS — K43.9 VENTRAL HERNIA WITHOUT OBSTRUCTION OR GANGRENE: Primary | ICD-10-CM

## 2022-07-19 DIAGNOSIS — T81.49XA WOUND INFECTION AFTER SURGERY: ICD-10-CM

## 2022-07-19 PROCEDURE — 99024 POSTOP FOLLOW-UP VISIT: CPT | Performed by: SURGERY

## 2022-07-19 NOTE — PROGRESS NOTES
poDate: 2022      Name: Carie Yeung      MRN: [id number]       : 1985       Age: 40 y.o. Sex: female        PROVIDER UNKNOWN, MD       CC:    No chief complaint on file. HPI:  The patient presents for the fourth post-op visit s/p open ventral hernia repair done on 6/10/22. The patient was seen on  by Tima Pham NP who removed all of her staples. She developed cellulitis and a small opening which I cauterized with silver nitrate sticks on 22. Wound is granulating. No cellulitis. Physical Exam:     There were no vitals taken for this visit. General: Alert, oriented, cooperative white female in no acute distress. Neck: Supple, trachea midline, no appreciable thyromegaly  Resp: Breathing is  non-labored. Lungs clear to auscultation without wheezing or rhonchi   CV: RRR. No murmurs, rubs or gallops appreciated. Abd: soft, small open area of midline wound above umbilicus. Cellulitis gone    Assessment/Plan:  Carie Yeung is a 40 y.o. female who is s/p open ventral hernia repair done on 6/10/22. 1. Silver nitrate sticks to open area    2. Local wound care    3. Follow-up one week.     Bryan Pantoja MD  [facility]   2022  7:41 AM

## 2022-07-20 ENCOUNTER — HOSPITAL ENCOUNTER (EMERGENCY)
Age: 37
Discharge: HOME | End: 2022-07-20
Attending: EMERGENCY MEDICINE | Admitting: EMERGENCY MEDICINE
Payer: COMMERCIAL

## 2022-07-20 ENCOUNTER — HOSPITAL ENCOUNTER (EMERGENCY)
Dept: GENERAL RADIOLOGY | Age: 37
Discharge: HOME | End: 2022-07-23
Payer: COMMERCIAL

## 2022-07-20 ENCOUNTER — HOSPITAL ENCOUNTER (EMERGENCY)
Dept: CT IMAGING | Age: 37
Discharge: HOME | End: 2022-07-23
Payer: COMMERCIAL

## 2022-07-20 VITALS
WEIGHT: 222 LBS | OXYGEN SATURATION: 98 % | DIASTOLIC BLOOD PRESSURE: 66 MMHG | HEART RATE: 70 BPM | SYSTOLIC BLOOD PRESSURE: 110 MMHG | TEMPERATURE: 97.8 F | HEIGHT: 64 IN | RESPIRATION RATE: 16 BRPM | BODY MASS INDEX: 37.9 KG/M2

## 2022-07-20 DIAGNOSIS — R10.9 ABDOMINAL PAIN, UNSPECIFIED ABDOMINAL LOCATION: ICD-10-CM

## 2022-07-20 DIAGNOSIS — R42 LIGHT HEADEDNESS: Primary | ICD-10-CM

## 2022-07-20 DIAGNOSIS — R00.2 PALPITATIONS: ICD-10-CM

## 2022-07-20 LAB
ALBUMIN SERPL-MCNC: 3.6 G/DL (ref 3.5–5)
ALBUMIN/GLOB SERPL: 0.9 {RATIO} (ref 1.2–3.5)
ALP SERPL-CCNC: 107 U/L (ref 50–136)
ALT SERPL-CCNC: 28 U/L (ref 12–65)
ANION GAP SERPL CALC-SCNC: 4 MMOL/L (ref 7–16)
AST SERPL-CCNC: 16 U/L (ref 15–37)
BILIRUB SERPL-MCNC: 0.4 MG/DL (ref 0.2–1.1)
BUN SERPL-MCNC: 9 MG/DL (ref 6–23)
CALCIUM SERPL-MCNC: 8.6 MG/DL (ref 8.3–10.4)
CHLORIDE SERPL-SCNC: 106 MMOL/L (ref 98–107)
CO2 SERPL-SCNC: 29 MMOL/L (ref 21–32)
CREAT SERPL-MCNC: 0.65 MG/DL (ref 0.6–1)
EKG ATRIAL RATE: 68 BPM
EKG DIAGNOSIS: NORMAL
EKG P AXIS: 71 DEGREES
EKG P-R INTERVAL: 124 MS
EKG Q-T INTERVAL: 386 MS
EKG QRS DURATION: 72 MS
EKG QTC CALCULATION (BAZETT): 407 MS
EKG R AXIS: 55 DEGREES
EKG T AXIS: 55 DEGREES
EKG VENTRICULAR RATE: 67 BPM
ERYTHROCYTE [DISTWIDTH] IN BLOOD BY AUTOMATED COUNT: 12.1 % (ref 11.9–14.6)
GLOBULIN SER CALC-MCNC: 4 G/DL (ref 2.3–3.5)
GLUCOSE SERPL-MCNC: 88 MG/DL (ref 65–100)
HCG UR QL: NEGATIVE
HCT VFR BLD AUTO: 42.7 % (ref 35.8–46.3)
HGB BLD-MCNC: 14.2 G/DL (ref 11.7–15.4)
MAGNESIUM SERPL-MCNC: 2 MG/DL (ref 1.8–2.4)
MCH RBC QN AUTO: 30.3 PG (ref 26.1–32.9)
MCHC RBC AUTO-ENTMCNC: 33.3 G/DL (ref 31.4–35)
MCV RBC AUTO: 91.2 FL (ref 79.6–97.8)
NRBC # BLD: 0 K/UL (ref 0–0.2)
PLATELET # BLD AUTO: 320 K/UL (ref 150–450)
PMV BLD AUTO: 10.4 FL (ref 9.4–12.3)
POTASSIUM SERPL-SCNC: 3.9 MMOL/L (ref 3.5–5.1)
PROT SERPL-MCNC: 7.6 G/DL (ref 6.3–8.2)
RBC # BLD AUTO: 4.68 M/UL (ref 4.05–5.2)
SODIUM SERPL-SCNC: 139 MMOL/L (ref 136–145)
TROPONIN I SERPL HS-MCNC: 8.2 PG/ML (ref 0–14)
TSH, 3RD GENERATION: 0.39 UIU/ML (ref 0.36–3.74)
WBC # BLD AUTO: 8.7 K/UL (ref 4.3–11.1)

## 2022-07-20 PROCEDURE — 2580000003 HC RX 258: Performed by: NURSE PRACTITIONER

## 2022-07-20 PROCEDURE — 84443 ASSAY THYROID STIM HORMONE: CPT

## 2022-07-20 PROCEDURE — 83735 ASSAY OF MAGNESIUM: CPT

## 2022-07-20 PROCEDURE — 93005 ELECTROCARDIOGRAM TRACING: CPT | Performed by: EMERGENCY MEDICINE

## 2022-07-20 PROCEDURE — 74177 CT ABD & PELVIS W/CONTRAST: CPT

## 2022-07-20 PROCEDURE — 80053 COMPREHEN METABOLIC PANEL: CPT

## 2022-07-20 PROCEDURE — 81025 URINE PREGNANCY TEST: CPT

## 2022-07-20 PROCEDURE — 85027 COMPLETE CBC AUTOMATED: CPT

## 2022-07-20 PROCEDURE — 6360000004 HC RX CONTRAST MEDICATION: Performed by: NURSE PRACTITIONER

## 2022-07-20 PROCEDURE — 71045 X-RAY EXAM CHEST 1 VIEW: CPT

## 2022-07-20 PROCEDURE — 84484 ASSAY OF TROPONIN QUANT: CPT

## 2022-07-20 PROCEDURE — 99285 EMERGENCY DEPT VISIT HI MDM: CPT

## 2022-07-20 RX ORDER — 0.9 % SODIUM CHLORIDE 0.9 %
100 INTRAVENOUS SOLUTION INTRAVENOUS
Status: COMPLETED | OUTPATIENT
Start: 2022-07-20 | End: 2022-07-20

## 2022-07-20 RX ORDER — 0.9 % SODIUM CHLORIDE 0.9 %
1000 INTRAVENOUS SOLUTION INTRAVENOUS
Status: COMPLETED | OUTPATIENT
Start: 2022-07-20 | End: 2022-07-20

## 2022-07-20 RX ORDER — SODIUM CHLORIDE 0.9 % (FLUSH) 0.9 %
10 SYRINGE (ML) INJECTION
Status: COMPLETED | OUTPATIENT
Start: 2022-07-20 | End: 2022-07-20

## 2022-07-20 RX ADMIN — SODIUM CHLORIDE 1000 ML: 9 INJECTION, SOLUTION INTRAVENOUS at 13:05

## 2022-07-20 RX ADMIN — SODIUM CHLORIDE, PRESERVATIVE FREE 10 ML: 5 INJECTION INTRAVENOUS at 13:40

## 2022-07-20 RX ADMIN — IOPAMIDOL 100 ML: 755 INJECTION, SOLUTION INTRAVENOUS at 13:32

## 2022-07-20 RX ADMIN — SODIUM CHLORIDE 100 ML: 9 INJECTION, SOLUTION INTRAVENOUS at 13:40

## 2022-07-20 ASSESSMENT — ENCOUNTER SYMPTOMS
ABDOMINAL PAIN: 1
SHORTNESS OF BREATH: 0
DIARRHEA: 0
NAUSEA: 0
VOMITING: 0

## 2022-07-20 ASSESSMENT — PAIN SCALES - GENERAL
PAINLEVEL_OUTOF10: 0
PAINLEVEL_OUTOF10: 0

## 2022-07-20 NOTE — ED NOTES
I have reviewed discharge instructions with the patient. The patient verbalized understanding. Patient left ED via Discharge Method: ambulatory to Home with self. Opportunity for questions and clarification provided. Patient given 0 scripts. To continue your aftercare when you leave the hospital, you may receive an automated call from our care team to check in on how you are doing. This is a free service and part of our promise to provide the best care and service to meet your aftercare needs.  If you have questions, or wish to unsubscribe from this service please call [phone]. Thank you for Choosing our [facility] Emergency Department.         MORALES Culp  07/20/22 8341

## 2022-07-20 NOTE — ED PROVIDER NOTES
[employer] Emergency Department Provider Note                   PCP:                Jami Caceres MD               Age: 40 y.o. Sex: female       ICD-10-CM    1. Light headedness  R42       2. Palpitations  R00.2       3. Abdominal pain, unspecified abdominal location  R10.9           DISPOSITION         MDM  Number of Diagnoses or Management Options  Abdominal pain, unspecified abdominal location: new, needed workup  Light headedness: new, needed workup  Palpitations: new, needed workup  Diagnosis management comments: 40-year-old female who presents emergency department today with complaint of lightheadedness, palpitations, and low blood pressure. She also has some abdominal pain since having hernia repair surgery. Patient is overall well-appearing in the emergency department today. Vital signs are stable. No significant drop in blood pressure with position changes noted today. Labs are grossly normal.  Urine is negative. CT concerning that the hernia mesh cannot be properly positioned. Surgery states that Dr. Mari Katz reviewed the CT and the mesh she has appropriately positioned. Okay to discharge patient. She has an appointment with cardiology coming up. I have encouraged her to keep this appointment. I have discussed the results of all labs, procedures, radiographs, and/or treatments with the patient and available family members. Treatment plan is agreed upon by the patient and the patient is ready for discharge. Questions about treatment in the ED and differential diagnosis of presenting condition were answered. Patient was given verbal discharge instructions including, but not limited to, importance of returning to the emergency department for any concern of worsening or continued symptoms. Instructions were given to follow-up with a primary care provider or specialist within 1 to 2 days.          Amount and/or Complexity of Data Reviewed  Clinical lab tests: reviewed and ordered  Tests in the radiology section of CPT®: reviewed and ordered  Tests in the medicine section of CPT®: reviewed and ordered  Review and summarize past medical records: yes    Risk of Complications, Morbidity, and/or Mortality  Presenting problems: moderate  Diagnostic procedures: moderate  Management options: moderate    Patient Progress  Patient progress: improved       Orders Placed This Encounter   Procedures    XR CHEST PORTABLE    CT ABDOMEN PELVIS W IV CONTRAST Additional Contrast? None    CBC    Comprehensive Metabolic Panel    Magnesium    TSH    Cardiac Monitor    Pulse Oximetry    POCT Urine Dipstick    POC PREGNANCY UR-QUAL    Orthostatic blood pressure and pulse    POC Pregnancy Urine Qual    EKG 12 Lead    Saline lock IV        Ethelda Bence is a 40 y.o. female who presents to the Emergency Department with chief complaint of    Chief Complaint   Patient presents with    Palpitations      40-year-old female who presents emergency department today with complaint of lightheadedness, palpitations, and low blood pressure. Patient states that symptoms began about 6 weeks ago after hernia repair. She also states she has gained about 20 pounds since her hernia repair but has not had any significant changes in her diet. She denies any nausea, vomiting, diarrhea, chest pain, shortness of breath, headaches, or vision changes. She states that she saw her primary care provider yesterday and with palpation of her abdomen, she had significant pain. She describes the palpitations as a fluttering in her heart but states that she does not have any chest pain. The history is provided by the patient. All other systems reviewed and are negative. Review of Systems   Constitutional:  Negative for fever. Respiratory:  Negative for shortness of breath. Cardiovascular:  Positive for palpitations. Negative for chest pain and leg swelling. Gastrointestinal:  Positive for abdominal pain.  Negative for diarrhea, nausea and vomiting. All other systems reviewed and are negative. Past Medical History:   Diagnosis Date    Abnormal Pap smear 2011 & 2015    HPV +    Abnormal Papanicolaou smear of cervix  &      2004    Anemia     in the past    Anxiety     Asthma     childhood, exercise induced as an adult    BMI 37.0-37.9, adult     Celiac disease     Depression     no meds    Endometriosis     Female dyspareunia     Genital herpes     Herpes simplex without mention of complication     Type 2    High-risk pregnancy in second trimester 2019    Flu vaccine given 10/16/19  2019 at [facility]:  Normal NT, NB present. Genetic counseling done by physician. Pt declines testing. May lift up to 45# at work at this time; limit if concerns develop 2019 at [facility]:  Normal anatomy/fetal Echo. Normal CL at 4.3 cm. Hx of PTL and delivery at 35 weeks in 2 pregnancies prior to last Twin Pregnancy. 10/3/2019 at [facility]: Normal CL 3.7 cm, no funneling    History of hyperthyroidism 3/2014    treated w/radiation    Hypothyroidism     managed w/med    Infertility, female     Miscarriage     Pelvic pain in female 10/8/2015    Postpartum depression     with last pregnancy-twins     labor in third trimester without delivery 10/16/2017    Ventral hernia without obstruction or gangrene     Open ventral hernia repair with mesh using a large Ventralex mesh. Meghna Celestin  [facility], MD 6/10/22        Past Surgical History:   Procedure Laterality Date    APPENDECTOMY  2021    BREAST SURGERY  2008     SECTION  10/16/2017,     Twins- Male    COLPOSCOPY      HYSTERECTOMY (CERVIX STATUS UNKNOWN)  2021    LEEP      OTHER SURGICAL HISTORY      Fulguration of Endometriosis    OTHER SURGICAL HISTORY  2015    Bx to intestines (celiac dz)    PELVIC LAPAROSCOPY  , ,     VENTRAL HERNIA REPAIR N/A 6/10/2022    HERNIA VENTRAL REPAIR WIHT MESH performed by Edna Vega MD at [facility] WISDOM TOOTH EXTRACTION  4/2007        Family History   Problem Relation Age of Onset    Stroke Maternal Uncle     Other Father         Cleft Palate    Thyroid Disease Maternal Uncle     Prostate Cancer Maternal Uncle     Stroke Maternal Grandfather     Cancer Maternal Grandfather         brain cancer    Ovarian Cancer Paternal Grandmother     Breast Cancer Paternal Grandmother     Alzheimer's Disease Paternal Grandmother     Breast Cancer Paternal Aunt         Twice    Depression Mother     Breast Cancer Mother     Stroke Maternal Grandmother     Heart Disease Paternal Grandfather     Thyroid Disease Other         hyperthyroidism    Thyroid Disease Other         hyperthyroidism    Heart Defect Sister         Hole in Heart (closed)    Heart Disease Maternal Uncle         Social History     Socioeconomic History    Marital status: Single   Tobacco Use    Smoking status: Never    Smokeless tobacco: Never   Substance and Sexual Activity    Alcohol use: Not Currently    Drug use: No        Allergies: Patient has no known allergies. Previous Medications    ASCORBIC ACID (VITAMIN C) 500 MG TABLET    Take 500 mg by mouth daily    CALCIUM PO    Take by mouth daily    FOLIC ACID (FOLVITE) 174 MCG TABLET    Take 800 mcg by mouth daily    LEVOTHYROXINE (SYNTHROID) 137 MCG TABLET    Take by mouth every morning (before breakfast)    LEVOTHYROXINE (SYNTHROID) 150 MCG TABLET    Take 150 mcg by mouth every morning (before breakfast)    SERTRALINE (ZOLOFT) 50 MG TABLET    Take 1 tablet by mouth daily    VALACYCLOVIR (VALTREX) 500 MG TABLET    Take 500 mg by mouth 2 times daily    VITAMIN D 25 MCG (1000 UT) CAPS    Take by mouth daily        Vitals signs and nursing note reviewed.    Patient Vitals for the past 4 hrs:   Pulse Resp BP SpO2   07/20/22 1520 71 15 110/66 97 %   07/20/22 1441 68 17 113/67 98 %   07/20/22 1323 75 18 110/67 98 %   07/20/22 1313 65 18 118/80 97 %   07/20/22 1308 73 -- 104/70 --   07/20/22 1303 62 15 104/73 98 %   07/20/22 1243 71 21 100/69 98 %   07/20/22 1223 73 19 108/65 97 %   07/20/22 1208 65 15 -- 100 %   07/20/22 1203 62 10 123/61 97 %   07/20/22 1158 67 22 136/62 98 %          Physical Exam  Vitals and nursing note reviewed. Constitutional:       General: She is not in acute distress. Appearance: Normal appearance. She is well-developed. She is not ill-appearing, toxic-appearing or diaphoretic. HENT:      Head: Normocephalic and atraumatic. Mouth/Throat:      Mouth: Mucous membranes are moist.   Eyes:      General: No scleral icterus. Extraocular Movements: Extraocular movements intact. Cardiovascular:      Rate and Rhythm: Normal rate and regular rhythm. Heart sounds: Normal heart sounds. Pulmonary:      Effort: Pulmonary effort is normal. No respiratory distress. Breath sounds: Normal breath sounds. Abdominal:      General: Abdomen is flat. Bowel sounds are normal. There is no distension. Palpations: Abdomen is soft. Skin:     General: Skin is warm and dry. Capillary Refill: Capillary refill takes less than 2 seconds. Neurological:      General: No focal deficit present. Mental Status: She is alert and oriented to person, place, and time. Psychiatric:         Mood and Affect: Mood normal.         Behavior: Behavior normal.        Procedures    ED EKG Interpretation  EKG was interpreted in the absence of a cardiologist.    Rate: Rate: Normal  EKG Interpretation: EKG Interpretation: sinus rhythm  ST Segments: Normal ST segments - NO STEMI    Labs Reviewed   COMPREHENSIVE METABOLIC PANEL - Abnormal; Notable for the following components:       Result Value    Anion Gap 4 (*)     Globulin 4.0 (*)     Albumin/Globulin Ratio 0.9 (*)     All other components within normal limits   CBC   TROPONIN   MAGNESIUM   TSH   POC PREGNANCY UR-QUAL        CT ABDOMEN PELVIS W IV CONTRAST Additional Contrast? None   Final Result      1. Interval ventral hernia repair.  The superior portions of the hernia repair   mesh are not in direct apposition with the anterior abdominal wall, particularly   on the right. The mesh directly abuts the transverse colon, without transverse   colonic thickening, surrounding free fluid, or evidence of colonic obstruction. Correlation for desired mesh positioning is recommended. 2.  Small volume fluid in the ventral subcutaneous fat at the incision site. No   intraperitoneal fluid collection. 3.  Chronic findings otherwise as above. XR CHEST PORTABLE   Final Result      No evidence of acute cardiopulmonary disease. ED Course as of 07/20/22 1555   Wed Jul 20, 2022   1325 XR CHEST PORTABLE  IMPRESSION:     No evidence of acute cardiopulmonary disease. [BC]   1440 CT ABDOMEN PELVIS W IV CONTRAST Additional Contrast? None  IMPRESSION:     1. Interval ventral hernia repair. The superior portions of the hernia repair  mesh are not in direct apposition with the anterior abdominal wall, particularly  on the right. The mesh directly abuts the transverse colon, without transverse  colonic thickening, surrounding free fluid, or evidence of colonic obstruction. Correlation for desired mesh positioning is recommended. 2.  Small volume fluid in the ventral subcutaneous fat at the incision site. No  intraperitoneal fluid collection. 3.  Chronic findings otherwise as above. [BC]   1708 [employer] message to general surgery regarding CT result. [BC]   1553 Awaiting response from general surgery about what they would like to do in regards to patient CT scan today. They stated that Dr. Saint Clair was in surgery and that he will look at the CT when he is out. They stated to have patient wait in the emergency department until that time. [BC]      ED Course User Index  [BC] Jorge Maradiaga, KAILA - CNP        Voice dictation software was used during the making of this note.   This software is not perfect and grammatical and other typographical errors may be present. This note has not been completely proofread for errors.        Darnell Nieves, APRN - [id number]   07/20/22 2407

## 2022-07-23 NOTE — PROGRESS NOTES
poDate: 2022      Name: Thompson Reyes      MRN: [id number]       : 1985       Age: 40 y.o. Sex: female        PROVIDER UNKNOWN, MD       CC:    No chief complaint on file. HPI:  The patient presents for the fourth post-op visit s/p open ventral hernia repair done on 6/10/22. The patient was seen on  by Ana Maria Thakkar NP who removed all of her staples. She developed cellulitis and a small opening which I cauterized with silver nitrate sticks on 22 and on 22. The patient went to the ED on 22 with lightheadedness,  palpitations and low BP. A CT scan of the abdomen was done which showed:    COMPARISON: CT abdomen and pelvis 2021       INDICATION: abd pain after hernia repair       TECHNIQUE: Contiguous axial computed tomographic images were obtained from the   domes of the diaphragm to the symphysis pubis following administration 100mL   Iso-arabella 370. Coronal reconstructions were also performed. Radiation dose reduction techniques were used for this study. Our CT scanners   use one or all of the following: Automated exposure control, adjustment of the   mA and/or kV according to patient size, iterative reconstruction. FINDINGS:       LIMITED THORAX:Partially imaged breast implants. The included portions of the   heart and pericardium are unremarkable. The included lung bases are clear. PERITONEUM/MESENTERY: No pneumoperitoneum. No lymphadenopathy. GI TRACT: Interval ventral hernia repair. The superior portions of the hernia   mesh or not in direct apposition with the anterior abdominal wall. Superior   portions of the mesh but the transverse colon. There is minimal adjacent   stranding and no intraperitoneal fluid collection. There is a small volume of   ill-defined fluid in the subcutaneous soft tissues at the surgical incision   site. Surgical clips adjacent to the cecum, likely from prior appendectomy.  The   terminal ileum is unremarkable. There is no evidence of a small bowel   obstruction. The stomach is unremarkable. SPLEEN: Normal       ADRENALS: Normal       PANCREAS: Normal       LIVER: Normal       GALLBLADDER: Normal       KIDNEYS: Normal       BLADDER/: Unremarkable urinary bladder. Prior hysterectomy. VESSELS: The main portal vein and major tributary branches are patent. No   evidence of abdominal aortic aneurysm. BONES/SOFT TISSUES: No suspicious lytic or blastic bony lesions. Impression       1. Interval ventral hernia repair. The superior portions of the hernia repair   mesh are not in direct apposition with the anterior abdominal wall, particularly   on the right. The mesh directly abuts the transverse colon, without transverse   colonic thickening, surrounding free fluid, or evidence of colonic obstruction. Correlation for desired mesh positioning is recommended. 2.  Small volume fluid in the ventral subcutaneous fat at the incision site. No   intraperitoneal fluid collection. 3.  Chronic findings otherwise as above. The patient has mild pain in the epigastric region. The pain is better than when she went to the ED on 7/20/22. No fever, chills, nausea or vomiting. Physical Exam:     There were no vitals taken for this visit. General: Alert, oriented, cooperative white female in no acute distress. Neck: Supple, trachea midline, no appreciable thyromegaly  Resp: Breathing is  non-labored. Lungs clear to auscultation without wheezing or rhonchi   CV: RRR. No murmurs, rubs or gallops appreciated. Abd: soft, wound looks better, not as deep, granulation tissue present. No cellulitis. Assessment/Plan:  Dami Coto is a 40 y.o. female who is s/p open ventral hernia repair done on 6/10/22. 1. Local wound care. 2.  F/U in 2 weeks to reevaluate. 3.  If pain gets worse, may need diagnostic laparoscopy to evaluate the hernia repair mesh.     JOSH NINO [facility], MD  [facility]   7/23/2022  6:27 PM

## 2022-07-26 ENCOUNTER — OFFICE VISIT (OUTPATIENT)
Dept: SURGERY | Age: 37
End: 2022-07-26

## 2022-07-26 DIAGNOSIS — K43.9 VENTRAL HERNIA WITHOUT OBSTRUCTION OR GANGRENE: Primary | ICD-10-CM

## 2022-07-26 DIAGNOSIS — T81.49XA WOUND INFECTION AFTER SURGERY: ICD-10-CM

## 2022-07-26 PROCEDURE — 99024 POSTOP FOLLOW-UP VISIT: CPT | Performed by: SURGERY

## 2022-08-08 NOTE — PROGRESS NOTES
poDate: 2022      Name: Shawn Allred      MRN: [id number]       : 1985       Age: 40 y.o. Sex: female        PROVIDER UNKNOWN, MD       CC:    No chief complaint on file. HPI:  The patient presents for the fifth post-op visit s/p open ventral hernia repair done on 6/10/22. The patient was seen on  by Camryn Thakur NP who removed all of her staples. She developed cellulitis and a small opening which I cauterized with silver nitrate sticks on 22 and on 22. The patient went to the ED on 22 with lightheadedness,  palpitations and low BP. A CT scan of the abdomen was done which showed:    COMPARISON: CT abdomen and pelvis 2021       INDICATION: abd pain after hernia repair       TECHNIQUE: Contiguous axial computed tomographic images were obtained from the   domes of the diaphragm to the symphysis pubis following administration 100mL   Iso-arabella 370. Coronal reconstructions were also performed. Radiation dose reduction techniques were used for this study. Our CT scanners   use one or all of the following: Automated exposure control, adjustment of the   mA and/or kV according to patient size, iterative reconstruction. FINDINGS:       LIMITED THORAX:Partially imaged breast implants. The included portions of the   heart and pericardium are unremarkable. The included lung bases are clear. PERITONEUM/MESENTERY: No pneumoperitoneum. No lymphadenopathy. GI TRACT: Interval ventral hernia repair. The superior portions of the hernia   mesh or not in direct apposition with the anterior abdominal wall. Superior   portions of the mesh but the transverse colon. There is minimal adjacent   stranding and no intraperitoneal fluid collection. There is a small volume of   ill-defined fluid in the subcutaneous soft tissues at the surgical incision   site. Surgical clips adjacent to the cecum, likely from prior appendectomy. The   terminal ileum is unremarkable.

## 2022-08-15 ENCOUNTER — OFFICE VISIT (OUTPATIENT)
Dept: SURGERY | Age: 37
End: 2022-08-15

## 2022-08-15 DIAGNOSIS — T81.49XA WOUND INFECTION AFTER SURGERY: ICD-10-CM

## 2022-08-15 DIAGNOSIS — K43.9 VENTRAL HERNIA WITHOUT OBSTRUCTION OR GANGRENE: Primary | ICD-10-CM

## 2022-08-15 PROCEDURE — 99024 POSTOP FOLLOW-UP VISIT: CPT | Performed by: SURGERY

## 2022-09-13 NOTE — PROGRESS NOTES
poDate: 2022      Name: Priscilla Batres      MRN: [id number]       : 1985       Age: 40 y.o. Sex: female        PROVIDER UNKNOWN, MD       CC:    No chief complaint on file. HPI:  The patient presents for the fifth post-op visit s/p open ventral hernia repair done on 6/10/22. The patient was seen on  by Damaris Robbins NP who removed all of her staples. She developed cellulitis and a small opening which I cauterized with silver nitrate sticks on 22 and on 22. The patient went to the ED on 22 with lightheadedness,  palpitations and low BP. A CT scan of the abdomen was done which showed:    COMPARISON: CT abdomen and pelvis 2021       INDICATION: abd pain after hernia repair       TECHNIQUE: Contiguous axial computed tomographic images were obtained from the   domes of the diaphragm to the symphysis pubis following administration 100mL   Iso-arabella 370. Coronal reconstructions were also performed. Radiation dose reduction techniques were used for this study. Our CT scanners   use one or all of the following: Automated exposure control, adjustment of the   mA and/or kV according to patient size, iterative reconstruction. FINDINGS:       LIMITED THORAX:Partially imaged breast implants. The included portions of the   heart and pericardium are unremarkable. The included lung bases are clear. PERITONEUM/MESENTERY: No pneumoperitoneum. No lymphadenopathy. GI TRACT: Interval ventral hernia repair. The superior portions of the hernia   mesh or not in direct apposition with the anterior abdominal wall. Superior   portions of the mesh but the transverse colon. There is minimal adjacent   stranding and no intraperitoneal fluid collection. There is a small volume of   ill-defined fluid in the subcutaneous soft tissues at the surgical incision   site. Surgical clips adjacent to the cecum, likely from prior appendectomy. The   terminal ileum is unremarkable. There is no evidence of a small bowel   obstruction. The stomach is unremarkable. SPLEEN: Normal       ADRENALS: Normal       PANCREAS: Normal       LIVER: Normal       GALLBLADDER: Normal       KIDNEYS: Normal       BLADDER/: Unremarkable urinary bladder. Prior hysterectomy. VESSELS: The main portal vein and major tributary branches are patent. No   evidence of abdominal aortic aneurysm. BONES/SOFT TISSUES: No suspicious lytic or blastic bony lesions. Impression       1. Interval ventral hernia repair. The superior portions of the hernia repair   mesh are not in direct apposition with the anterior abdominal wall, particularly   on the right. The mesh directly abuts the transverse colon, without transverse   colonic thickening, surrounding free fluid, or evidence of colonic obstruction. Correlation for desired mesh positioning is recommended. 2.  Small volume fluid in the ventral subcutaneous fat at the incision site. No   intraperitoneal fluid collection. 3.  Chronic findings otherwise as above. The patient reports having some intermittent pain at the site. No nausea or vomiting. Back at work full time. Physical Exam:     There were no vitals taken for this visit. General: Alert, oriented, cooperative white female in no acute distress. Neck: Supple, trachea midline, no appreciable thyromegaly  Resp: Breathing is  non-labored. Lungs clear to auscultation without wheezing or rhonchi   CV: RRR. No murmurs, rubs or gallops appreciated. Abd: soft, no recurrent hernia, active BS'S. Assessment/Plan:  Makenzie Anderson is a 40 y.o. female who is s/p open ventral hernia repair done on 6/10/22. 1. No further surgical intervention necessary    2. F/U prn.       Chaitanya Hernandez MD  [facility]   9/13/2022  12:55 PM

## 2022-09-15 ENCOUNTER — OFFICE VISIT (OUTPATIENT)
Dept: SURGERY | Age: 37
End: 2022-09-15

## 2022-09-15 DIAGNOSIS — Z09 POSTOPERATIVE EXAMINATION: ICD-10-CM

## 2022-09-15 DIAGNOSIS — K43.9 VENTRAL HERNIA WITHOUT OBSTRUCTION OR GANGRENE: Primary | ICD-10-CM

## 2022-09-15 PROCEDURE — 99024 POSTOP FOLLOW-UP VISIT: CPT | Performed by: SURGERY

## 2022-12-08 ENCOUNTER — HOSPITAL ENCOUNTER (OUTPATIENT)
Dept: MAMMOGRAPHY | Age: 37
Discharge: HOME | End: 2022-12-11

## 2022-12-08 DIAGNOSIS — Z12.31 VISIT FOR SCREENING MAMMOGRAM: ICD-10-CM

## 2022-12-08 PROCEDURE — 77067 SCR MAMMO BI INCL CAD: CPT

## 2023-01-10 ENCOUNTER — TELEPHONE (OUTPATIENT)
Dept: SURGERY | Age: 38
End: 2023-01-10

## 2023-01-10 NOTE — TELEPHONE ENCOUNTER
Returned patients call concerning pain since Dec 2022. While working, when lifting and when her pants hit near her belly button where she states near her incision and mesh was placed. She states her PCP told her to reach out to us and see what she needs to do. She has her scheduled for a CT on tomorrow 1/11/23 @ [address] [place] [address]. Per Dr. MOORE [facility], I let the patient know he would like for her to come in for and appointment on next week to address the issues and not to have the CT until they discuss her issues. Per the patient she was going ahead with the CT and will call us after she get the results if she needs to se us.

## 2023-01-12 ENCOUNTER — TELEPHONE (OUTPATIENT)
Dept: SURGERY | Age: 38
End: 2023-01-12

## 2023-01-12 NOTE — TELEPHONE ENCOUNTER
Returned patients call concerning CT results. We have not received them in our office yet. She will reach out to her PCP and [employer] to get the to us ASAP.

## 2023-01-19 ENCOUNTER — TELEPHONE (OUTPATIENT)
Dept: SURGERY | Age: 38
End: 2023-01-19

## 2023-01-19 NOTE — TELEPHONE ENCOUNTER
Surgery  I spoke to the patient this morning about pain at her ventral hernia site, plus her recent CT scan. I recommended a diagnostic laparoscopy with removal or repositioning of the previously placed mesh. It may be necessary to remove the old mesh and place a new piece. She would like to proceed with surgery, but needs to clear it with her job as well as wait for her mother to recover from Covid, so she can help with her children. I left it for her to call and schedule surgery in the near future when she is able.      Chris Asher MD.

## 2023-01-31 PROBLEM — L03.311 CELLULITIS OF ABDOMINAL WALL: Status: ACTIVE | Noted: 2023-01-31

## 2023-01-31 PROBLEM — G89.18: Status: ACTIVE | Noted: 2023-01-31

## 2023-01-31 PROBLEM — T85.79XA INFECTED PROSTHETIC MESH OF ABDOMINAL WALL (HCC): Status: ACTIVE | Noted: 2023-01-31

## 2023-01-31 PROBLEM — R10.33: Status: ACTIVE | Noted: 2023-01-31

## 2023-01-31 PROBLEM — R10.9 ABDOMINAL PAIN: Status: ACTIVE | Noted: 2023-01-31

## 2023-02-02 ENCOUNTER — OFFICE VISIT (OUTPATIENT)
Dept: OBGYN CLINIC | Age: 38
End: 2023-02-02
Payer: COMMERCIAL

## 2023-02-02 VITALS
DIASTOLIC BLOOD PRESSURE: 62 MMHG | BODY MASS INDEX: 37.39 KG/M2 | HEIGHT: 64 IN | WEIGHT: 219 LBS | SYSTOLIC BLOOD PRESSURE: 106 MMHG

## 2023-02-02 DIAGNOSIS — Z13.89 SCREENING FOR GENITOURINARY CONDITION: ICD-10-CM

## 2023-02-02 DIAGNOSIS — Z12.4 SCREENING FOR CERVICAL CANCER: ICD-10-CM

## 2023-02-02 DIAGNOSIS — Z11.51 SCREENING FOR HUMAN PAPILLOMAVIRUS (HPV): ICD-10-CM

## 2023-02-02 DIAGNOSIS — Z01.419 WELL WOMAN EXAM: Primary | ICD-10-CM

## 2023-02-02 LAB
BILIRUBIN, URINE, POC: NEGATIVE
BLOOD URINE, POC: NEGATIVE
GLUCOSE URINE, POC: NEGATIVE
KETONES, URINE, POC: NEGATIVE
LEUKOCYTE ESTERASE, URINE, POC: NEGATIVE
NITRITE, URINE, POC: NEGATIVE
PH, URINE, POC: 6.5 (ref 4.6–8)
PROTEIN,URINE, POC: NEGATIVE
SPECIFIC GRAVITY, URINE, POC: 1.02 (ref 1–1.03)
URINALYSIS CLARITY, POC: CLEAR
URINALYSIS COLOR, POC: YELLOW
UROBILINOGEN, POC: NORMAL

## 2023-02-02 PROCEDURE — 81003 URINALYSIS AUTO W/O SCOPE: CPT | Performed by: OBSTETRICS & GYNECOLOGY

## 2023-02-02 PROCEDURE — 99395 PREV VISIT EST AGE 18-39: CPT | Performed by: OBSTETRICS & GYNECOLOGY

## 2023-02-02 RX ORDER — VALACYCLOVIR HYDROCHLORIDE 500 MG/1
500 TABLET, FILM COATED ORAL 2 TIMES DAILY
Qty: 30 TABLET | Refills: 3 | Status: SHIPPED | OUTPATIENT
Start: 2023-02-02

## 2023-02-02 NOTE — PROGRESS NOTES
HPI:  Ms. Jeffrey Ibrahim is a 40 y.o. female [address] [id number] who is here today for a well woman exam. She complains of nothing. She needs to have a surgery to fix her ventral mesh. Hernia repair in May. Refills of valtrex. Date Performed Result   PAP 2021 Negative, HPV negative    Mammogram 2022 Benign BD2   Colonoscopy na    Dexa na        OB History          5    Para   5    Term   5            AB        Living   5         SAB        IAB        Ectopic        Molar        Multiple        Live Births                  GYN History     No LMP recorded. Patient has had a hysterectomy. Past Medical History:   Diagnosis Date    Abnormal Pap smear 2011 & 2015    HPV +    Abnormal Papanicolaou smear of cervix  &      2004    Anemia     in the past    Anxiety     Asthma     childhood, exercise induced as an adult    BMI 37.0-37.9, adult     BRCA1 negative     BRCA2 negative     Celiac disease     Depression     no meds    Endometriosis     Female dyspareunia     Genital herpes     Herpes simplex without mention of complication     Type 2    High-risk pregnancy in second trimester 2019    Flu vaccine given 10/16/19  2019 at [facility]:  Normal NT, NB present. Genetic counseling done by physician. Pt declines testing. May lift up to 45# at work at this time; limit if concerns develop 2019 at [facility]:  Normal anatomy/fetal Echo. Normal CL at 4.3 cm. Hx of PTL and delivery at 35 weeks in 2 pregnancies prior to last Twin Pregnancy.  10/3/2019 at [facility]: Normal CL 3.7 cm, no funneling    History of hyperthyroidism 2014    treated w/radiation    Hypothyroidism     managed w/med    Infertility, female     Miscarriage     Pelvic pain in female 10/08/2015    Postpartum depression     with last pregnancy-twins     labor in third trimester without delivery 10/16/2017    Ventral hernia without obstruction or gangrene     Open ventral hernia repair with mesh using a large Ventralex mesh. Becky Ayala MD 6/10/22     Past Surgical History:   Procedure Laterality Date    APPENDECTOMY  2021    BREAST ENHANCEMENT SURGERY Bilateral 2008     SECTION  10/16/2017,     Twins- Male    COLPOSCOPY      HERNIA REPAIR      HYSTERECTOMY (CERVIX STATUS UNKNOWN)  2021    LEEP  2005    OTHER SURGICAL HISTORY      Fulguration of Endometriosis    OTHER SURGICAL HISTORY  2015    Bx to intestines (celiac dz)    PELVIC LAPAROSCOPY  , ,     VENTRAL HERNIA REPAIR N/A 06/10/2022    HERNIA VENTRAL REPAIR WIHT MESH performed by Raulito Arthur MD at [facility] EXTRACTION  2007       No Known Allergies    Current Outpatient Medications   Medication Sig Dispense Refill    valACYclovir (VALTREX) 500 MG tablet Take 1 tablet by mouth 2 times daily For 3 days prn 30 tablet 3    CALCIUM PO Take by mouth daily      ascorbic acid (VITAMIN C) 500 MG tablet Take 500 mg by mouth daily      vitamin D 25 MCG (1000 UT) CAPS Take by mouth daily      folic acid (FOLVITE) 318 MCG tablet Take 800 mcg by mouth daily      levothyroxine (SYNTHROID) 137 MCG tablet Take by mouth every morning (before breakfast) 112      levothyroxine (SYNTHROID) 150 MCG tablet Take 150 mcg by mouth every morning (before breakfast) 125       No current facility-administered medications for this visit. Social History     Socioeconomic History    Marital status: Single - together - 12 years.        Spouse name: Not on file    Number of children: Not on file    Years of education: Not on file    Highest education level: Not on file   Occupational History    Works at [employer]    Tobacco Use    Smoking status: Never    Smokeless tobacco: Never   Substance and Sexual Activity    Alcohol use: Not Currently    Drug use: No    Sexual activity: Not on file   Other Topics Concern    Exercise:  not really    Social History Narrative    Not on file     Social Determinants of Health     Financial Resource Strain: Not on file   Food Insecurity: Not on file   Transportation Needs: Not on file   Physical Activity: Not on file   Stress: Not on file   Social Connections: Not on file   Intimate Partner Violence: Not on file   Housing Stability: Not on file     Family History   Problem Relation Age of Onset    Depression Mother     Breast Cancer Mother 61    Other Father         Cleft Palate    Heart Defect Sister         [address] in [place] (closed)    Stroke Maternal Grandmother     Stroke Maternal Grandfather     Cancer Maternal Grandfather         brain cancer    Ovarian Cancer Paternal Grandmother     Breast Cancer Paternal Grandmother 45    Alzheimer's Disease Paternal Grandmother     Heart Disease Paternal Grandfather     Stroke Maternal Uncle     Thyroid Disease Maternal Uncle     Prostate Cancer Maternal Uncle     Heart Disease Maternal Uncle     Breast Cancer Paternal Aunt 61        Twice    Thyroid Disease Other         hyperthyroidism    Thyroid Disease Other         hyperthyroidism       Review of Systems   Neg except hpi     /62   Ht 5' 4\" (1.626 m)   Wt 219 lb (99.3 kg)   BMI 37.59 kg/m²    Physical Exam  Constitutional:       General: She is not in acute distress. Appearance: She is well-developed. HENT:      Head: Normocephalic and atraumatic. Neck:      Thyroid: No thyroid mass or thyromegaly. Cardiovascular:      Rate and Rhythm: Normal rate and regular rhythm. Pulmonary:      Effort: Pulmonary effort is normal.      Breath sounds: Normal breath sounds. Chest:   Breasts:     Right: Normal. No mass, nipple discharge or skin change. Left: Normal. No mass, nipple discharge or skin change. Abdominal:      General: There is no distension. Palpations: Abdomen is soft. Tenderness: There is no abdominal tenderness. Genitourinary:     General: Normal vulva. Labia:         Right: No rash or lesion. Left: No rash or lesion.        Vagina: Normal.      Uterus: Absent. Adnexa: Right adnexa normal and left adnexa normal.   Musculoskeletal:      Cervical back: Normal range of motion and neck supple. Neurological:      General: No focal deficit present. Mental Status: She is alert. Psychiatric:         Attention and Perception: Attention normal.         Mood and Affect: Mood and affect normal.       Assessment/Plan  Mikala Coulter was seen today for annual exam.    Diagnoses and all orders for this visit:    Well woman exam  -     Cancel: PAP IG, HPV Rfx HPV 16/18,45; Future  -     AMB POC URINALYSIS DIP STICK AUTO W/O MICRO    Screening for cervical cancer  -     Cancel: PAP IG, HPV Rfx HPV 16/18,45; Future    Screening for human papillomavirus (HPV)  -     Cancel: PAP IG, HPV Rfx HPV 16/18,45; Future    Screening for genitourinary condition  -     AMB POC URINALYSIS DIP STICK AUTO W/O MICRO    Other orders  -     valACYclovir (VALTREX) 500 MG tablet;  Take 1 tablet by mouth 2 times daily For 3 days prn       Return in about 1 year (around 2/2/2024) for annual exam.     Mora Gallardo D.O

## 2023-02-17 NOTE — LACTATION NOTE
First visit. Twins, 34 weeks, in SCN. History of breast augmentation in 2008. Mom began pumping at midnight. Retrieving . 3-2 mls colostrum. Mom bottle fed first child and pumped and bottle fed second child (11 yrs old) x9 months. Plans to pump and bottle feed only. Discussed need to pump at least 8 times per day, every 3 hours for adequate stimulation. Discussed pumping at infants bedside. Skin to skin when infants able. Mom confident with pump use, cleaning, collection, and labeling. Denies needs or questions. Lactation to follow up tomorrow. Patent

## 2023-02-28 ENCOUNTER — PREP FOR PROCEDURE (OUTPATIENT)
Dept: SURGERY | Age: 38
End: 2023-02-28

## 2023-02-28 RX ORDER — PHENTERMINE HYDROCHLORIDE 37.5 MG/1
37.5 CAPSULE ORAL EVERY MORNING
COMMUNITY

## 2023-02-28 RX ORDER — LEVOTHYROXINE SODIUM 125 MCG
TABLET ORAL
COMMUNITY
Start: 2023-01-12

## 2023-02-28 RX ORDER — LEVOTHYROXINE SODIUM 112 UG/1
112 TABLET ORAL WEEKLY
COMMUNITY

## 2023-02-28 NOTE — PERIOP NOTE
Patient verified name and . Order for consent not found in EHR; patient verifies procedure. Type II surgery, phone assessment complete. Orders not received. Labs per surgeon: none  Labs per anesthesia protocol: hgb    Patient answered medical/surgical history questions at their best of ability. All prior to admission medications documented in [facility]. Patient instructed to take the following medications the day of surgery according to anesthesia guidelines with a small sip of water: Levothyroxine (Synthroid) On the day before surgery please take Acetaminophen 1000mg in the morning and then again before bed. You may substitute for Tylenol 650 mg. Hold all vitamins 7 days prior to surgery and NSAIDS 5 days prior to surgery. Prescription meds to hold:adipex hold for 5 days  Patient instructed on the following:    > Arrive at [facility], time of arrival to be called the day before by 1700  > NPO after midnight, unless otherwise indicated, including gum, mints, and ice chips  > Responsible adult must drive patient to the hospital, stay during surgery, and patient will need supervision 24 hours after anesthesia  > Use soap in shower the night before surgery and on the morning of surgery  > All piercings must be removed prior to arrival.    > Leave all valuables (money and jewelry) at home but bring insurance card and ID on DOS.   > You may be required to pay a deductible or co-pay on the day of your procedure. You can pre-pay by calling [phone] if your surgery is at the [facility] or [phone] if your surgery is at the [facility]. > Do not wear make-up, nail polish, lotions, cologne, perfumes, powders, or oil on skin. Artificial nails are not permitted.

## 2023-03-03 ENCOUNTER — ANESTHESIA EVENT (OUTPATIENT)
Dept: SURGERY | Age: 38
End: 2023-03-03
Payer: COMMERCIAL

## 2023-03-03 ENCOUNTER — ANESTHESIA (OUTPATIENT)
Dept: SURGERY | Age: 38
End: 2023-03-03
Payer: COMMERCIAL

## 2023-03-03 ENCOUNTER — HOSPITAL ENCOUNTER (OUTPATIENT)
Age: 38
Setting detail: OUTPATIENT SURGERY
Discharge: HOME | End: 2023-03-03
Attending: SURGERY | Admitting: SURGERY
Payer: COMMERCIAL

## 2023-03-03 VITALS
RESPIRATION RATE: 15 BRPM | TEMPERATURE: 98.3 F | HEART RATE: 55 BPM | DIASTOLIC BLOOD PRESSURE: 66 MMHG | WEIGHT: 220 LBS | OXYGEN SATURATION: 95 % | HEIGHT: 65 IN | BODY MASS INDEX: 36.65 KG/M2 | SYSTOLIC BLOOD PRESSURE: 106 MMHG

## 2023-03-03 DIAGNOSIS — G89.18 POSTOPERATIVE PERIUMBILICAL ABDOMINAL PAIN: Primary | ICD-10-CM

## 2023-03-03 DIAGNOSIS — R10.33 POSTOPERATIVE PERIUMBILICAL ABDOMINAL PAIN: Primary | ICD-10-CM

## 2023-03-03 LAB — HGB BLD-MCNC: 14.7 G/DL (ref 11.7–15.4)

## 2023-03-03 PROCEDURE — 7100000011 HC PHASE II RECOVERY - ADDTL 15 MIN: Performed by: SURGERY

## 2023-03-03 PROCEDURE — 7100000010 HC PHASE II RECOVERY - FIRST 15 MIN: Performed by: SURGERY

## 2023-03-03 PROCEDURE — 3600000004 HC SURGERY LEVEL 4 BASE: Performed by: SURGERY

## 2023-03-03 PROCEDURE — 7100000000 HC PACU RECOVERY - FIRST 15 MIN: Performed by: SURGERY

## 2023-03-03 PROCEDURE — 2500000003 HC RX 250 WO HCPCS: Performed by: NURSE ANESTHETIST, CERTIFIED REGISTERED

## 2023-03-03 PROCEDURE — 6360000002 HC RX W HCPCS: Performed by: NURSE ANESTHETIST, CERTIFIED REGISTERED

## 2023-03-03 PROCEDURE — 88304 TISSUE EXAM BY PATHOLOGIST: CPT

## 2023-03-03 PROCEDURE — C1781 MESH (IMPLANTABLE): HCPCS | Performed by: SURGERY

## 2023-03-03 PROCEDURE — 2580000003 HC RX 258: Performed by: NURSE ANESTHETIST, CERTIFIED REGISTERED

## 2023-03-03 PROCEDURE — 2709999900 HC NON-CHARGEABLE SUPPLY: Performed by: SURGERY

## 2023-03-03 PROCEDURE — 88300 SURGICAL PATH GROSS: CPT

## 2023-03-03 PROCEDURE — 49320 DIAG LAPARO SEPARATE PROC: CPT | Performed by: SURGERY

## 2023-03-03 PROCEDURE — 3700000001 HC ADD 15 MINUTES (ANESTHESIA): Performed by: SURGERY

## 2023-03-03 PROCEDURE — 3700000000 HC ANESTHESIA ATTENDED CARE: Performed by: SURGERY

## 2023-03-03 PROCEDURE — 2500000003 HC RX 250 WO HCPCS: Performed by: SURGERY

## 2023-03-03 PROCEDURE — 85018 HEMOGLOBIN: CPT

## 2023-03-03 PROCEDURE — 6370000000 HC RX 637 (ALT 250 FOR IP): Performed by: ANESTHESIOLOGY

## 2023-03-03 PROCEDURE — 3600000014 HC SURGERY LEVEL 4 ADDTL 15MIN: Performed by: SURGERY

## 2023-03-03 PROCEDURE — 7100000001 HC PACU RECOVERY - ADDTL 15 MIN: Performed by: SURGERY

## 2023-03-03 PROCEDURE — 6360000002 HC RX W HCPCS: Performed by: SURGERY

## 2023-03-03 PROCEDURE — 2720000010 HC SURG SUPPLY STERILE: Performed by: SURGERY

## 2023-03-03 PROCEDURE — 2580000003 HC RX 258: Performed by: ANESTHESIOLOGY

## 2023-03-03 DEVICE — MESH HERN L DIA8CM VENTRAL POLYPR EPTFE CIR SELF EXP PTCH: Type: IMPLANTABLE DEVICE | Site: ABDOMEN | Status: FUNCTIONAL

## 2023-03-03 RX ORDER — KETOROLAC TROMETHAMINE 30 MG/ML
INJECTION, SOLUTION INTRAMUSCULAR; INTRAVENOUS PRN
Status: DISCONTINUED | OUTPATIENT
Start: 2023-03-03 | End: 2023-03-03 | Stop reason: SDUPTHER

## 2023-03-03 RX ORDER — SODIUM CHLORIDE 0.9 % (FLUSH) 0.9 %
5-40 SYRINGE (ML) INJECTION PRN
Status: DISCONTINUED | OUTPATIENT
Start: 2023-03-03 | End: 2023-03-03 | Stop reason: HOSPADM

## 2023-03-03 RX ORDER — HYDROMORPHONE HYDROCHLORIDE 2 MG/ML
0.5 INJECTION, SOLUTION INTRAMUSCULAR; INTRAVENOUS; SUBCUTANEOUS EVERY 5 MIN PRN
Status: DISCONTINUED | OUTPATIENT
Start: 2023-03-03 | End: 2023-03-03 | Stop reason: HOSPADM

## 2023-03-03 RX ORDER — OXYCODONE HYDROCHLORIDE AND ACETAMINOPHEN 5; 325 MG/1; MG/1
1 TABLET ORAL EVERY 6 HOURS PRN
Qty: 20 TABLET | Refills: 0 | Status: SHIPPED | OUTPATIENT
Start: 2023-03-03 | End: 2023-03-08

## 2023-03-03 RX ORDER — FENTANYL CITRATE 50 UG/ML
100 INJECTION, SOLUTION INTRAMUSCULAR; INTRAVENOUS
Status: DISCONTINUED | OUTPATIENT
Start: 2023-03-03 | End: 2023-03-03 | Stop reason: HOSPADM

## 2023-03-03 RX ORDER — KETAMINE HCL IN NACL, ISO-OSM 20 MG/2 ML
SYRINGE (ML) INJECTION PRN
Status: DISCONTINUED | OUTPATIENT
Start: 2023-03-03 | End: 2023-03-03 | Stop reason: SDUPTHER

## 2023-03-03 RX ORDER — SODIUM CHLORIDE 0.9 % (FLUSH) 0.9 %
5-40 SYRINGE (ML) INJECTION EVERY 12 HOURS SCHEDULED
Status: DISCONTINUED | OUTPATIENT
Start: 2023-03-03 | End: 2023-03-03 | Stop reason: HOSPADM

## 2023-03-03 RX ORDER — DEXAMETHASONE SODIUM PHOSPHATE 10 MG/ML
INJECTION INTRAMUSCULAR; INTRAVENOUS PRN
Status: DISCONTINUED | OUTPATIENT
Start: 2023-03-03 | End: 2023-03-03 | Stop reason: SDUPTHER

## 2023-03-03 RX ORDER — ROCURONIUM BROMIDE 10 MG/ML
INJECTION, SOLUTION INTRAVENOUS PRN
Status: DISCONTINUED | OUTPATIENT
Start: 2023-03-03 | End: 2023-03-03 | Stop reason: SDUPTHER

## 2023-03-03 RX ORDER — SODIUM CHLORIDE 9 MG/ML
INJECTION, SOLUTION INTRAVENOUS PRN
Status: DISCONTINUED | OUTPATIENT
Start: 2023-03-03 | End: 2023-03-03 | Stop reason: HOSPADM

## 2023-03-03 RX ORDER — OXYCODONE HYDROCHLORIDE 5 MG/1
5 TABLET ORAL
Status: COMPLETED | OUTPATIENT
Start: 2023-03-03 | End: 2023-03-03

## 2023-03-03 RX ORDER — LIDOCAINE HYDROCHLORIDE 10 MG/ML
1 INJECTION, SOLUTION INFILTRATION; PERINEURAL
Status: DISCONTINUED | OUTPATIENT
Start: 2023-03-03 | End: 2023-03-03 | Stop reason: HOSPADM

## 2023-03-03 RX ORDER — MIDAZOLAM HYDROCHLORIDE 1 MG/ML
INJECTION INTRAMUSCULAR; INTRAVENOUS PRN
Status: DISCONTINUED | OUTPATIENT
Start: 2023-03-03 | End: 2023-03-03 | Stop reason: SDUPTHER

## 2023-03-03 RX ORDER — NEOSTIGMINE METHYLSULFATE 1 MG/ML
INJECTION, SOLUTION INTRAVENOUS PRN
Status: DISCONTINUED | OUTPATIENT
Start: 2023-03-03 | End: 2023-03-03 | Stop reason: SDUPTHER

## 2023-03-03 RX ORDER — LIDOCAINE HYDROCHLORIDE 20 MG/ML
INJECTION, SOLUTION EPIDURAL; INFILTRATION; INTRACAUDAL; PERINEURAL PRN
Status: DISCONTINUED | OUTPATIENT
Start: 2023-03-03 | End: 2023-03-03 | Stop reason: SDUPTHER

## 2023-03-03 RX ORDER — GLYCOPYRROLATE 0.2 MG/ML
INJECTION INTRAMUSCULAR; INTRAVENOUS PRN
Status: DISCONTINUED | OUTPATIENT
Start: 2023-03-03 | End: 2023-03-03 | Stop reason: SDUPTHER

## 2023-03-03 RX ORDER — HYDROMORPHONE HCL 110MG/55ML
PATIENT CONTROLLED ANALGESIA SYRINGE INTRAVENOUS PRN
Status: DISCONTINUED | OUTPATIENT
Start: 2023-03-03 | End: 2023-03-03 | Stop reason: SDUPTHER

## 2023-03-03 RX ORDER — ONDANSETRON 2 MG/ML
INJECTION INTRAMUSCULAR; INTRAVENOUS PRN
Status: DISCONTINUED | OUTPATIENT
Start: 2023-03-03 | End: 2023-03-03 | Stop reason: SDUPTHER

## 2023-03-03 RX ORDER — MIDAZOLAM HYDROCHLORIDE 2 MG/2ML
2 INJECTION, SOLUTION INTRAMUSCULAR; INTRAVENOUS
Status: DISCONTINUED | OUTPATIENT
Start: 2023-03-03 | End: 2023-03-03 | Stop reason: HOSPADM

## 2023-03-03 RX ORDER — SODIUM CHLORIDE, SODIUM LACTATE, POTASSIUM CHLORIDE, CALCIUM CHLORIDE 600; 310; 30; 20 MG/100ML; MG/100ML; MG/100ML; MG/100ML
INJECTION, SOLUTION INTRAVENOUS CONTINUOUS
Status: DISCONTINUED | OUTPATIENT
Start: 2023-03-03 | End: 2023-03-03 | Stop reason: HOSPADM

## 2023-03-03 RX ORDER — PROCHLORPERAZINE EDISYLATE 5 MG/ML
5 INJECTION INTRAMUSCULAR; INTRAVENOUS
Status: DISCONTINUED | OUTPATIENT
Start: 2023-03-03 | End: 2023-03-03 | Stop reason: HOSPADM

## 2023-03-03 RX ORDER — PROPOFOL 10 MG/ML
INJECTION, EMULSION INTRAVENOUS PRN
Status: DISCONTINUED | OUTPATIENT
Start: 2023-03-03 | End: 2023-03-03 | Stop reason: SDUPTHER

## 2023-03-03 RX ORDER — BUPIVACAINE HYDROCHLORIDE 5 MG/ML
INJECTION, SOLUTION EPIDURAL; INTRACAUDAL PRN
Status: DISCONTINUED | OUTPATIENT
Start: 2023-03-03 | End: 2023-03-03 | Stop reason: HOSPADM

## 2023-03-03 RX ORDER — FENTANYL CITRATE 50 UG/ML
INJECTION, SOLUTION INTRAMUSCULAR; INTRAVENOUS PRN
Status: DISCONTINUED | OUTPATIENT
Start: 2023-03-03 | End: 2023-03-03 | Stop reason: SDUPTHER

## 2023-03-03 RX ADMIN — Medication 20 MG: at 11:28

## 2023-03-03 RX ADMIN — ROCURONIUM BROMIDE 50 MG: 10 INJECTION, SOLUTION INTRAVENOUS at 11:14

## 2023-03-03 RX ADMIN — SODIUM CHLORIDE, POTASSIUM CHLORIDE, SODIUM LACTATE AND CALCIUM CHLORIDE: 600; 310; 30; 20 INJECTION, SOLUTION INTRAVENOUS at 09:34

## 2023-03-03 RX ADMIN — ROCURONIUM BROMIDE 10 MG: 10 INJECTION, SOLUTION INTRAVENOUS at 12:00

## 2023-03-03 RX ADMIN — PHENYLEPHRINE HYDROCHLORIDE 100 MCG: 10 INJECTION INTRAVENOUS at 11:35

## 2023-03-03 RX ADMIN — DEXAMETHASONE SODIUM PHOSPHATE 10 MG: 10 INJECTION INTRAMUSCULAR; INTRAVENOUS at 11:21

## 2023-03-03 RX ADMIN — MIDAZOLAM 2 MG: 1 INJECTION INTRAMUSCULAR; INTRAVENOUS at 11:05

## 2023-03-03 RX ADMIN — OXYCODONE HYDROCHLORIDE 5 MG: 5 TABLET ORAL at 13:43

## 2023-03-03 RX ADMIN — KETOROLAC TROMETHAMINE 30 MG: 30 INJECTION, SOLUTION INTRAMUSCULAR; INTRAVENOUS at 12:37

## 2023-03-03 RX ADMIN — HYDROMORPHONE HYDROCHLORIDE 0.4 MG: 2 INJECTION INTRAMUSCULAR; INTRAVENOUS; SUBCUTANEOUS at 11:28

## 2023-03-03 RX ADMIN — GLYCOPYRROLATE 0.8 MG: 0.2 INJECTION INTRAMUSCULAR; INTRAVENOUS at 12:14

## 2023-03-03 RX ADMIN — Medication 2000 MG: at 11:20

## 2023-03-03 RX ADMIN — PROPOFOL 20 MG: 10 INJECTION, EMULSION INTRAVENOUS at 12:15

## 2023-03-03 RX ADMIN — PROPOFOL 10 MG: 10 INJECTION, EMULSION INTRAVENOUS at 12:17

## 2023-03-03 RX ADMIN — PROPOFOL 20 MG: 10 INJECTION, EMULSION INTRAVENOUS at 12:14

## 2023-03-03 RX ADMIN — LIDOCAINE HYDROCHLORIDE 100 MG: 20 INJECTION, SOLUTION EPIDURAL; INFILTRATION; INTRACAUDAL; PERINEURAL at 11:14

## 2023-03-03 RX ADMIN — PROPOFOL 200 MG: 10 INJECTION, EMULSION INTRAVENOUS at 11:14

## 2023-03-03 RX ADMIN — Medication 5 MG: at 12:14

## 2023-03-03 RX ADMIN — ONDANSETRON 4 MG: 2 INJECTION INTRAMUSCULAR; INTRAVENOUS at 12:08

## 2023-03-03 RX ADMIN — FENTANYL CITRATE 100 MCG: 50 INJECTION, SOLUTION INTRAMUSCULAR; INTRAVENOUS at 11:14

## 2023-03-03 ASSESSMENT — PAIN SCALES - GENERAL: PAINLEVEL_OUTOF10: 4

## 2023-03-03 ASSESSMENT — PAIN - FUNCTIONAL ASSESSMENT
PAIN_FUNCTIONAL_ASSESSMENT: WONG-BAKER FACES
PAIN_FUNCTIONAL_ASSESSMENT: 0-10

## 2023-03-03 ASSESSMENT — PAIN DESCRIPTION - LOCATION: LOCATION: ABDOMEN

## 2023-03-03 NOTE — DISCHARGE INSTRUCTIONS
1. Diet as tolerated except for a  low fat diet after laparoscopic cholecystectomy. 2. Showering is allowed, but no tub baths, hot tubs or swimming. 3. Drainage is common from the wounds. Change the dressings as needed. Call our office if the wounds become reddened, tender, feel warm to the touch or pus starts to drain from the wound. 4. Take prescribed pain medication as directed, usually Percocet, Norco, Ultram or Dilaudid. Take over the counter medication for minor pain. 5. Ice may be applied intermittently to the surgical site or sites. 6. Call or office, [phone], if problems arise. 7. Follow up in the office at the assigned time. 8. Resume all medications as taken per surgery, unless specifically instructed not to take certain ones. 9. No lifting more than 25 pounds until told otherwise. 10. Driving is allowed 3 days after surgery as long as you feel comfortable enough to drive and have not taken any prescription pain medication prior to driving. HERNIA REPAIR    ACTIVITY  As tolerated and as directed by your doctor. You may shower starting tomorrow but do not take a bath until released by your doctor. Avoid lifting more than 5 pounds (pulling and straining). Avoid excessive use of stairs. Take deep breathes and support incision with pillow when you cough. DIET  Clear liquids until no nausea or vomiting; then light diet for the first day. Advance to regular diet on second day, unless directed by your doctor. If nausea or vomiting continues, call your doctor. You may notice that your bowel movements are not regular right after your surgery. This is common. Try to avoid constipation and straining with bowel movements. You may want to take a fiber supplement every day (Miralax). If you have not had a bowel movement after a couple of days, ask your doctor about taking a mild laxative.     MEDICATION INTERACTION:  During your procedure you potentially received a medication or medications which may reduce the effectiveness of oral contraceptives. Please consider other forms of contraception for 1 month following your procedure if you are currently using oral contraceptives as your primary form of birth control. In addition to this, we recommend continuing your oral contraceptive as prescribed, unless otherwise instructed by your physician, during this time. CALL YOUR DOCTOR IF   Excessive bleeding that does not stop after holding pressure over the area. Temperature of 101 degrees F or above. Redness, excessive swelling or bruising, and/ or green or yellow, smelly discharge from incision. After general anesthesia or intravenous sedation, for 24 hours or while taking prescription Narcotics:  Limit your activities  A responsible adult needs to be with you for the next 24 hours  Do not drive and operate hazardous machinery  Do not make important personal or business decisions  Do not drink alcoholic beverages  If you have not urinated within 8 hours after discharge, and you are experiencing discomfort from urinary retention, please go to the nearest ED. If you have sleep apnea and have a CPAP machine, please use it for all naps and sleeping. Please use caution when taking narcotics and any of your home medications that may cause drowsiness. *  Please give a list of your current medications to your Primary Care Provider. *  Please update this list whenever your medications are discontinued, doses are      changed, or new medications (including over-the-counter products) are added. *  Please carry medication information at all times in case of emergency situations. These are general instructions for a healthy lifestyle:  No smoking/ No tobacco products/ Avoid exposure to second hand smoke  Surgeon General's Warning:  Quitting smoking now greatly reduces serious risk to your health.   Obesity, smoking, and sedentary lifestyle greatly increases your risk for illness  A healthy diet, regular physical exercise & weight monitoring are important for maintaining a healthy lifestyle    You may be retaining fluid if you have a history of heart failure or if you experience any of the following symptoms:  Weight gain of 3 pounds or more overnight or 5 pounds in a week, increased swelling in our hands or feet or shortness of breath while lying flat in bed. Please call your doctor as soon as you notice any of these symptoms; do not wait until your next office visit.

## 2023-03-03 NOTE — ANESTHESIA PRE PROCEDURE
Department of Anesthesiology  Preprocedure Note       Name:  Mahendra [facility]   Age:  40 y.o.  :  1985                                          MRN:  [id number]         Date:  3/3/2023      Surgeon: Teresa Mojica):  Ludwin Dennis MD    Procedure: Procedure(s):  DIAGNOSTIC LAPAROSCOPY  REMOVAL OR REPOSITIONING OF THE PREVIOUSLY PLACED HERNIA MESH    Medications prior to admission:   Prior to Admission medications    Medication Sig Start Date End Date Taking? Authorizing Provider   SYNTHROID 125 MCG tablet TAKE 1 TABLET [address] [place] [address] ,SYNTHROID BRAND 23   Historical Provider, MD   levothyroxine (SYNTHROID) 112 MCG tablet Take 112 mcg by mouth once a week ON     Historical Provider, MD   phentermine 37.5 MG capsule Take 37.5 mg by mouth every morning. Historical Provider, MD   valACYclovir (VALTREX) 500 MG tablet Take 1 tablet by mouth 2 times daily For 3 days prn 23   Tanvi K Alt, DO   CALCIUM PO Take by mouth daily  Patient not taking: No sig reported    Ar Automatic Reconciliation   ascorbic acid (VITAMIN C) 500 MG tablet Take 500 mg by mouth daily  Patient not taking: No sig reported    Ar Automatic Reconciliation   vitamin D 25 MCG (1000 UT) CAPS Take by mouth daily  Patient not taking: No sig reported    Ar Automatic Reconciliation   folic acid (FOLVITE) 417 MCG tablet Take 800 mcg by mouth daily  Patient not taking: No sig reported    Ar Automatic Reconciliation       Current medications:    No current facility-administered medications for this visit. No current outpatient medications on file.      Facility-Administered Medications Ordered in Other Visits   Medication Dose Route Frequency Provider Last Rate Last Admin    lidocaine 1 % injection 1 mL  1 mL IntraDERmal Once PRN Azar Davis MD        fentaNYL (SUBLIMAZE) injection 100 mcg  100 mcg IntraVENous Once PRN Azar Davis MD        lactated ringers IV soln infusion   IntraVENous Continuous Riddhi Prabhakar  mL/hr at 23 1046 NoRateChange at 23 1046    sodium chloride flush 0.9 % injection 5-40 mL  5-40 mL IntraVENous 2 times per day Riddhi Prabhakar MD        sodium chloride flush 0.9 % injection 5-40 mL  5-40 mL IntraVENous PRN Riddhi Prabhakar MD        0.9 % sodium chloride infusion   IntraVENous PRN Riddhi Prabhakar MD        midazolam PF (VERSED) injection 2 mg  2 mg IntraVENous Once PRN Riddhi Prabhakar MD        ceFAZolin (ANCEF) 2000 mg in sterile water 20 mL IV syringe  2,000 mg IntraVENous On Call Brenton Healy MD           Allergies:  No Known Allergies    Problem List:    Patient Active Problem List   Diagnosis Code    Encounter for immunization Z23    Acute appendicitis K35.80    Postpartum care and examination Z39.2    Severe obesity ([address] E66.01    Postoperative periumbilical abdominal pain G89.18, R10.33    Abdominal pain R10.9    Infected prosthetic mesh of abdominal wall ([address] T85.79XA    Cellulitis of abdominal wall L03.311       Past Medical History:        Diagnosis Date    Abnormal Pap smear 2011 & 2015    HPV +    Abnormal Papanicolaou smear of cervix  &      2004    Anemia     in the past    Anxiety     Asthma     childhood, exercise induced as an adult    BMI 37.0-37.9, adult     BRCA1 negative     BRCA2 negative     Celiac disease     Depression     no meds    Endometriosis     Female dyspareunia     Genital herpes     Herpes simplex without mention of complication     Type 2    High-risk pregnancy in second trimester 2019    Flu vaccine given 10/16/19  2019 at [facility]:  Normal NT, NB present. Genetic counseling done by physician. Pt declines testing. May lift up to 45# at work at this time; limit if concerns develop 2019 at [facility]:  Normal anatomy/fetal Echo. Normal CL at 4.3 cm. Hx of PTL and delivery at 35 weeks in 2 pregnancies prior to last Twin Pregnancy.  10/3/2019 at [facility]: Normal CL 3.7 cm, no funneling    History of hyperthyroidism 2014    treated w/radiation    Hypothyroidism     managed w/med    Infertility, female     Miscarriage     Pelvic pain in female 10/08/2015    Postpartum depression     with last pregnancy-twins     labor in third trimester without delivery 10/16/2017    Ventral hernia without obstruction or gangrene     Open ventral hernia repair with mesh using a large Ventralex mesh. Erwin Burleson MD 6/10/22       Past Surgical History:        Procedure Laterality Date    APPENDECTOMY  2021    BREAST ENHANCEMENT SURGERY Bilateral 2008     SECTION  10/16/2017,     Twins- Male    COLPOSCOPY      HERNIA REPAIR      HYSTERECTOMY (CERVIX STATUS UNKNOWN)  2021    LEEP      OTHER SURGICAL HISTORY      Fulguration of Endometriosis    OTHER SURGICAL HISTORY  2015    Bx to intestines (celiac dz)    PELVIC LAPAROSCOPY  , ,     VENTRAL HERNIA REPAIR N/A 06/10/2022    HERNIA VENTRAL REPAIR WIHT MESH performed by Walter Dunne MD at [facility]  2007       Social History:    Social History     Tobacco Use    Smoking status: Never    Smokeless tobacco: Never   Substance Use Topics    Alcohol use: Not Currently                                Counseling given: Not Answered      Vital Signs (Current): There were no vitals filed for this visit.                                            BP Readings from Last 3 Encounters:   23 119/70   23 106/62   22 110/66       NPO Status:                                                                                 BMI:   Wt Readings from Last 3 Encounters:   23 220 lb (99.8 kg)   23 219 lb (99.3 kg)   22 222 lb (100.7 kg)     There is no height or weight on file to calculate BMI.    CBC:   Lab Results   Component Value Date/Time    WBC 8.7 2022 12:11 PM    RBC 4.68 2022 12:11 PM    HGB 14.7 03/03/2023 09:21 AM    HCT 42.7 07/20/2022 12:11 PM    MCV 91.2 07/20/2022 12:11 PM    RDW 12.1 07/20/2022 12:11 PM     07/20/2022 12:11 PM       CMP:   Lab Results   Component Value Date/Time     07/20/2022 12:11 PM    K 3.9 07/20/2022 12:11 PM     07/20/2022 12:11 PM    CO2 29 07/20/2022 12:11 PM    BUN 9 07/20/2022 12:11 PM    CREATININE 0.65 07/20/2022 12:11 PM    GFRAA >60 07/20/2022 12:11 PM    LABGLOM >60 07/20/2022 12:11 PM    GLUCOSE 88 07/20/2022 12:11 PM    PROT 7.6 07/20/2022 12:11 PM    CALCIUM 8.6 07/20/2022 12:11 PM    BILITOT 0.4 07/20/2022 12:11 PM    ALKPHOS 107 07/20/2022 12:11 PM    AST 16 07/20/2022 12:11 PM    ALT 28 07/20/2022 12:11 PM       POC Tests: No results for input(s): POCGLU, POCNA, POCK, POCCL, POCBUN, POCHEMO, POCHCT in the last 72 hours.     Coags: No results found for: PROTIME, INR, APTT    HCG (If Applicable):   Lab Results   Component Value Date    PREGTESTUR Negative 07/20/2022        ABGs:   Lab Results   Component Value Date/Time    MBZ3YEU 27.0 01/23/2020 01:59 PM    BEART 0 01/23/2020 01:59 PM        Type & Screen (If Applicable):  No results found for: LABABO, LABRH    Drug/Infectious Status (If Applicable):  Lab Results   Component Value Date/Time    HEPCAB <0.1 06/26/2019 11:34 AM       COVID-19 Screening (If Applicable):   Lab Results   Component Value Date/Time    COVID19 Not detected 12/16/2021 02:29 AM    COVID19 Not Detected 12/03/2021 08:09 AM    COVID19 Performed 12/03/2021 08:09 AM           Anesthesia Evaluation  Patient summary reviewed and Nursing notes reviewed no history of anesthetic complications:   Airway: Mallampati: II  TM distance: >3 FB   Neck ROM: full  Mouth opening: > = 3 FB   Dental: normal exam         Pulmonary: breath sounds clear to auscultation  (+) asthma: exercise-induced asthma,                            Cardiovascular:Negative CV ROS  Exercise tolerance: good (>4 METS),           Rhythm: regular  Rate: normal Neuro/Psych:   Negative Neuro/Psych ROS  (+) depression/anxiety             GI/Hepatic/Renal: Neg GI/Hepatic/Renal ROS           ROS comment: Celiac disease. Endo/Other: Negative Endo/Other ROS   (+) hypothyroidism::., .                 Abdominal:             Vascular: negative vascular ROS. Other Findings:             Anesthesia Plan      general     ASA 2       Induction: intravenous. Anesthetic plan and risks discussed with patient.                         Dede Wilcox MD   3/3/2023

## 2023-03-03 NOTE — PERIOP NOTE
Discharge instructions reviewed with pt and family member who verbalize understanding of follow up care.

## 2023-03-03 NOTE — ANESTHESIA PROCEDURE NOTES
Airway  Date/Time: 3/3/2023 11:16 AM  Urgency: elective    Airway not difficult    General Information and Staff    Patient location during procedure: OR  Anesthesiologist: Ashley Eckert MD  Resident/CRNA: KAILA Tyler CRNA  Performed: resident/CRNA     Indications and Patient Condition  Indications for airway management: anesthesia  Spontaneous Ventilation: absent  Sedation level: deep  Preoxygenated: yes  Patient position: sniffing  MILS not maintained throughout  Mask difficulty assessment: vent by bag mask    Final Airway Details  Final airway type: endotracheal airway      Successful airway: ETT  Cuffed: yes   Successful intubation technique: direct laryngoscopy  Facilitating devices/methods: intubating stylet  Endotracheal tube insertion site: oral  Blade: Avelina  Blade size: #3  ETT size (mm): 7.0  Cormack-Lehane Classification: grade I - full view of glottis  Placement verified by: chest auscultation and capnometry   Measured from: lips  ETT to lips (cm): 21  Number of attempts at approach: 1  Ventilation between attempts: bag mask  Number of other approaches attempted: 0

## 2023-03-03 NOTE — H&P
poDate: 3/3/2023      Name: Sidra Arreguin [facility]      MRN: [id number]       : 1985       Age: 40 y.o. Sex: female        Yordy Ramirez MD       CC:    No chief complaint on file. HPI:  The patient presents for the fifth post-op visit s/p open ventral hernia repair done on 6/10/22. The patient was seen on  by Monique Arteaga NP who removed all of her staples. She developed cellulitis and a small opening which I cauterized with silver nitrate sticks on 22 and on 22. The patient went to the ED on 22 with lightheadedness,  palpitations and low BP. A CT scan of the abdomen was done which showed:    COMPARISON: CT abdomen and pelvis 2021       INDICATION: abd pain after hernia repair       TECHNIQUE: Contiguous axial computed tomographic images were obtained from the   domes of the diaphragm to the symphysis pubis following administration 100mL   Iso-arabella 370. Coronal reconstructions were also performed. Radiation dose reduction techniques were used for this study. Our CT scanners   use one or all of the following: Automated exposure control, adjustment of the   mA and/or kV according to patient size, iterative reconstruction. FINDINGS:       LIMITED THORAX:Partially imaged breast implants. The included portions of the   heart and pericardium are unremarkable. The included lung bases are clear. PERITONEUM/MESENTERY: No pneumoperitoneum. No lymphadenopathy. GI TRACT: Interval ventral hernia repair. The superior portions of the hernia   mesh or not in direct apposition with the anterior abdominal wall. Superior   portions of the mesh but the transverse colon. There is minimal adjacent   stranding and no intraperitoneal fluid collection. There is a small volume of   ill-defined fluid in the subcutaneous soft tissues at the surgical incision   site. Surgical clips adjacent to the cecum, likely from prior appendectomy.  The   terminal ileum is unremarkable. There is no evidence of a small bowel   obstruction. The stomach is unremarkable.       SPLEEN: Normal       ADRENALS: Normal       PANCREAS: Normal       LIVER: Normal       GALLBLADDER: Normal       KIDNEYS: Normal       BLADDER/: Unremarkable urinary bladder. Prior hysterectomy.       VESSELS: The main portal vein and major tributary branches are patent. No   evidence of abdominal aortic aneurysm.       BONES/SOFT TISSUES: No suspicious lytic or blastic bony lesions.           Impression       1.  Interval ventral hernia repair. The superior portions of the hernia repair   mesh are not in direct apposition with the anterior abdominal wall, particularly   on the right. The mesh directly abuts the transverse colon, without transverse   colonic thickening, surrounding free fluid, or evidence of colonic obstruction.   Correlation for desired mesh positioning is recommended.       2.  Small volume fluid in the ventral subcutaneous fat at the incision site. No   intraperitoneal fluid collection.       3.  Chronic findings otherwise as above.     The patient reports having some intermittent pain at the site.     PMH:          Past Medical History:   Diagnosis Date    Abnormal Pap smear 2011 & 2015     HPV +    Abnormal Papanicolaou smear of cervix  &      2004    Anemia       Iron infusions in 2016; takes Iron daily     Anxiety      Asthma       childhood    BMI 37.0-37.9, adult      Celiac disease      Depression       no meds    Endometriosis      Female dyspareunia      Genital herpes      Herpes simplex without mention of complication       Type 2    High-risk pregnancy in second trimester 2019     Flu vaccine given 10/16/19  2019 at [facility]:  Normal NT, NB present.  Genetic counseling done by physician.  Pt declines testing.  May lift up to 45# at work at this time; limit if concerns develop 2019 at [facility]:  Normal anatomy/fetal Echo.  Normal CL at 4.3 cm. Hx of PTL and  delivery at 35 weeks in 2 pregnancies prior to last Twin Pregnancy. 10/3/2019 at [facility]: Normal CL 3.7 cm, no funneling    History of hyperthyroidism 3/2014     treated w/radiation    Hypothyroidism       managed w/med    Infertility, female      Miscarriage      Pelvic pain in female 10/8/2015    Postpartum depression       with last pregnancy-twins     labor in third trimester without delivery 10/16/2017         PSH:           Past Surgical History:   Procedure Laterality Date    HX APPENDECTOMY   2021    HX BREAST AUGMENTATION   2008    HX  SECTION   10/16/2017,      Twins- Male    HX COLPOSCOPY        HX LEEP PROCEDURE       HX OTHER SURGICAL         Fulguration of Endometriosis    HX OTHER SURGICAL   2015     Bx to intestines (celiac dz)    HX PELVIC LAPAROSCOPY   , ,     HX WISDOM TEETH EXTRACTION   2007         MEDS:          Current Outpatient Medications   Medication Sig    levothyroxine (Synthroid) 137 mcg tablet Take  by mouth Daily (before breakfast). Friday-     sertraline (ZOLOFT) 50 mg tablet Take 1 Tablet by mouth daily. levothyroxine (Synthroid) 150 mcg tablet Take 150 mcg by mouth Daily (before breakfast). Monday-Thursday  Indications: a condition with low thyroid hormone levels    valACYclovir (VALTREX) 500 mg tablet Take 1 Tablet by mouth two (2) times a day. ascorbic acid, vitamin C, (VITAMIN C) 500 mg tablet Take 500 mg by mouth daily. folic acid 906 mcg tablet Take 800 mcg by mouth daily. cholecalciferol (VITAMIN D3) 1,000 unit cap Take  by mouth daily. VITAMIN B COMPLEX NO.12-NIACIN PO Take  by mouth. CALCIUM PO Take  by mouth daily. FERROUS SULFATE (IRON PO) Take 1 Tablet by mouth daily. No current facility-administered medications for this visit.          ALLERGIES:       No Known Allergies     SH:     Social History            Tobacco Use    Smoking status: Never Smoker    Smokeless tobacco: Never Used   Vaping Use    Vaping Use: Never used   Substance Use Topics    Alcohol use: Yes       Comment: very rare     Drug use: No         FH:           Family History   Problem Relation Age of Onset    Stroke Maternal Uncle      Heart Disease Maternal Uncle      Thyroid Disease Maternal Uncle      Prostate Cancer Maternal Uncle      Stroke Maternal Grandfather      Cancer Maternal Grandfather           brain cancer    Ovarian Cancer Paternal Grandmother      Breast Cancer Paternal Grandmother      Alzheimer's Disease Paternal Grandmother      Breast Cancer Paternal Aunt           Twice    Depression Mother      Breast Cancer Mother      Stroke Maternal Grandmother      Heart Disease Paternal Grandfather      Thyroid Disease Other           hyperthyroidism    Thyroid Disease Other           hyperthyroidism    Heart defect Sister           Hole in Heart (closed)    Other Father           Cleft Palate        Physical Exam:     Ht 5' 5\" (1.651 m)   Wt 216 lb (98 kg)   BMI 35.94 kg/m²     General: Alert, oriented, cooperative white female in no acute distress. Neck: Supple, trachea midline, no appreciable thyromegaly  Resp: Breathing is  non-labored. Lungs clear to auscultation without wheezing or rhonchi   CV: RRR. No murmurs, rubs or gallops appreciated. Abd: soft, no recurrent hernia, active BS'S. Assessment/Plan:  Poncho Torres is a 40 y.o. female who is s/p open ventral hernia repair done on 6/10/22. I spoke to the patient this morning about pain at her ventral hernia site, plus her recent CT scan. I recommended a diagnostic laparoscopy with removal or repositioning of the previously placed mesh. It may be necessary to remove the old mesh and place a new piece. She would like to proceed with surgery, but needs to clear it with her job as well as wait for her mother to recover from Covid, so she can help with her children. I left it for her to call and schedule surgery in the near future when she is able. Javi Morales MD.    Marnia Portillo MD  [facility]   3/3/2023  8:41 AM

## 2023-03-03 NOTE — BRIEF OP NOTE
Brief Postoperative Note      Patient: Ryanne Parada  YOB: 1985  MRN: [id number]    Date of Procedure: 3/3/2023    Pre-Op Diagnosis: Postoperative periumbilical abdominal pain [G89.18, R10.33]  Abdominal pain [R10.9]    Post-Op Diagnosis: Same       Procedure(s):  DIAGNOSTIC LAPAROSCOPY  REMOVAL OF THE PREVIOUSLY PLACED HERNIA MESH, PLACEMENT OF NEW MESH    Surgeon(s):  Deanne Gomez MD    Assistant:  * No surgical staff found *    Anesthesia: General plus local    Estimated Blood Loss (mL): Minimal    Complications: None    Specimens:   ID Type Source Tests Collected by Time Destination   A : old mesh Tissue Abdomen SURGICAL PATHOLOGY Deanne Gomez MD 3/3/2023 1155        Implants:  Implant Name Type Inv. Item Serial No.  Lot No. LRB No. Used Action   MESH KAMARI L DIA8CM VENTRAL POLYPR EPTFE CIR SELF EXP PTCH - [id number]  MESH KAMARI L DIA8CM VENTRAL POLYPR EPTFE CIR SELF EXP [facility] DAVOL-WD [id number] N/A 1 Implanted         Drains: * No LDAs found *    Findings: See dictated noted.     Electronically signed by Sid Peterson MD on 3/3/2023 at 12:41 PM

## 2023-03-04 NOTE — ANESTHESIA POSTPROCEDURE EVALUATION
Department of Anesthesiology  Postprocedure Note    Patient: Timmy Palma  MRN: [id number]  YOB: 1985  Date of evaluation: 3/3/2023      Procedure Summary     Date: 03/03/23 Room / Location: [facility] MAIN OR 02 / [facility] MAIN OR    Anesthesia Start: 7924 Anesthesia Stop: 0817    Procedures:       DIAGNOSTIC LAPAROSCOPY (Abdomen)      REMOVAL OF THE PREVIOUSLY PLACED HERNIA MESH, PLACEMENT OF NEW MESH (Abdomen) Diagnosis:       Postoperative periumbilical abdominal pain      Abdominal pain      Infected prosthetic mesh of abdominal wall, initial encounter ([facility] [address]      Cellulitis of abdominal wall      (Postoperative periumbilical abdominal pain [G89.18, R10.33])      (Abdominal pain [R10.9])      (Infected prosthetic mesh of abdominal wall, initial encounter ([facility] [address] Sonali Babin)      (Cellulitis of abdominal wall [L03.311])    Providers: Albaro Mcmillan MD Responsible Provider: Emilie Hernandez MD    Anesthesia Type: general ASA Status: 2          Anesthesia Type: No value filed.     Colby Phase I: Colby Score: 8    Colby Phase II: Colby Score: 10      Anesthesia Post Evaluation    Patient location during evaluation: PACU  Patient participation: complete - patient participated  Level of consciousness: awake  Airway patency: patent  Nausea & Vomiting: no nausea and no vomiting  Complications: no  Cardiovascular status: hemodynamically stable  Respiratory status: acceptable, nonlabored ventilation and spontaneous ventilation  Hydration status: euvolemic  Comments: /66   Pulse 55   Temp 98.3 °F (36.8 °C)   Resp 15   Ht 5' 5\" (1.651 m)   Wt 220 lb (99.8 kg)   SpO2 95%   BMI 36.61 kg/m²     Multimodal analgesia pain management approach

## 2023-03-04 NOTE — OP NOTE
[address] [facility]  OPERATIVE REPORT    Name:  Cielo Bland  MR#:  [id number]  :  1985  ACCOUNT #:  [de-identified]  DATE OF SERVICE:  2023    PREOPERATIVE DIAGNOSES:  Abdominal pain and apparent poor positioning of previously placed mesh. POSTOPERATIVE DIAGNOSES:  Abdominal pain and apparent poor positioning of previously placed mesh. PROCEDURE PERFORMED:  Diagnostic laparoscopy, laparoscopic lysis of adhesions, removal of old mesh, and placement of a new large Ventralex patch. SURGEON:  Gustavo Waddell MD    ASSISTANT:  None. ANESTHESIA:  General endotracheal anesthesia. ANESTHESIOLOGIST:  Dr. Norma Parker:  None. SPECIMENS REMOVED:  Old mesh sent to Pathology for gross examination only. IMPLANTS:  None. ESTIMATED BLOOD LOSS:  Less than 10 mL. DRAINS:  None. HISTORY:  A 63-year-old female who originally had laparoscopic appendectomy with Dr. Jessica Ferraro.  He made a supraumbilical incision to do the appendectomy. She developed hernia at the site. Dr. Jessica Ferraro has left our practice and she came back to see me. I took her to the OR in 2022 and made an incision and placed Ventralex mesh and this place was almost like repairing an umbilical hernia. The patient did well initially and began to have pain and she got the wound infection at the site. I thought her pain was related to the wound infection. This was left to heal by secondary intention and dressing changes and the wound eventually healed. She continued to have pain. She went and saw her primary care physician who ordered a CT scan which showed that the superior portion of the mesh had folded under and was not adherent to the undersurface of the anterior abdominal wall. She has continued to complain of pain at this site. I recommended a diagnostic laparoscopy, possible removal of the old mesh and placement of new mesh.   The patient was agreeable, signed the consent form, was scheduled for today.    PROCEDURE:  The patient was seen in the preoperative area, then transported to room #2 [address] [facility] Operating Room. She received 2 g of Ancef as prophylactic antibiotic coverage. Sequential compression devices were placed and used throughout the procedure. Abdomen was prepped and draped in usual sterile manner. Time-out was done identifying the patient, surgeon, procedure, and birth date of 1985. When everyone in the room agreed, I began the procedure. I made an incision just inferior to the right subcostal margin near the right anterior axillary line, and through this, I placed a 12-mm trocar. It was an optical trocar with a 10-mm 0-degree scope in it. We got into the abdominal cavity, going through the appropriate layers of the anterior abdominal wall. Abdomen was insufflated to 15 mmHg using carbon dioxide gas after which 10-mm 30-degree scope was inserted. Two 5-mm trocars were placed, one in the right lower quadrant, one in the midline below the umbilicus. There were adhesions to the previous mesh from the omentum. There was no bowel involvement whatsoever. The mesh in the superior portion had folded under and the polypropylene side of the mesh was visible. I tried to straighten the mesh out. It was firmly adherent in its present position, so I elected to remove the mesh. This was done with 5-mm Harmonic scalpel. There were no fascial defects that were seen at this point. I suture repaired with Prolene sutures the fascial defect on the surface and then placed the mesh posterior to this. I elected to replace this mesh. I used a large Ventralex, cut the strap off and rolled it up in a cigarette-type fashion. I placed it through the 12-mm trocar in the right upper quadrant. It was unfurled and tacked with ProTack circumferentially. I used a medium-sized Ventralex previously, so the large Ventralex covered the old defect with more coverage.   We did add a 5-mm trocar at the level of the umbilicus in the left side of the abdomen to assist with placement of the ProTack elaina. Mesh was lying in good position. There was one little granuloma noted on the anterior abdominal wall right near where the mesh had been. I excised this. I did not feel there was any pathologic value, so was not sent to Pathology. I removed all the 5-mm trocars. I closed the fascia with a 12-mm trocar with interrupted sutures of 0 Vicryl. Skin was closed with subcuticular suture of 4-0 Monocryl. I did excise her old scar as it had healed, it was quite wide and unsightly scar, she had mentioned taking this out and redoing it to make it thinner more visibly appealing scar in the area above her umbilicus. This was done with electrocautery and surgical staples. Following this, I injected 30 mL 0.5% Marcaine around the incision sites. Dry sterile dressings were placed over the supraumbilical incision site and Steri-Strips were placed over the four laparoscopic incision sites. The patient tolerated the procedure well, was extubated, and brought to recovery room in stable condition.       MD CECE [id number]  D:  03/03/2023 15:08  T:  03/04/2023 0:25  JOB #:  [id number]

## 2023-03-07 ENCOUNTER — TELEPHONE (OUTPATIENT)
Dept: SURGERY | Age: 38
End: 2023-03-07

## 2023-03-07 NOTE — TELEPHONE ENCOUNTER
Returned patient call concerning her FMLA paper work. I let her know we extended her RTW for 2 additional weeks. Her FMLA paper were also faxed today to her employer.   If any other changes need to be made, it will be done at her post op appointment 3/15/23 with Rose Marie Mcmahon NP.

## 2023-03-14 ENCOUNTER — OFFICE VISIT (OUTPATIENT)
Dept: SURGERY | Age: 38
End: 2023-03-14

## 2023-03-14 DIAGNOSIS — Z09 POSTOPERATIVE EXAMINATION: Primary | ICD-10-CM

## 2023-03-14 PROCEDURE — 99024 POSTOP FOLLOW-UP VISIT: CPT | Performed by: NURSE PRACTITIONER

## 2023-03-14 ASSESSMENT — ENCOUNTER SYMPTOMS
RESPIRATORY NEGATIVE: 1
ABDOMINAL PAIN: 0
GASTROINTESTINAL NEGATIVE: 1
CONSTIPATION: 0
NAUSEA: 0
DIARRHEA: 0
VOMITING: 0

## 2023-03-14 NOTE — PROGRESS NOTES
[address] [facility]  [phone]        Name: Eden Christianson [facility]      MRN: [id number]       : 1985             PCP: Lynda Harrison MD       CC:    Chief Complaint   Patient presents with    Post-Op Check     S/p 3/3/23 Diagnostic laparoscopy, laparoscopic lysis of adhesions, removal of old mesh, and placement of a new large Ventralex patch        Clinical History: Per Dr Cortez Manzo notes,  patient is a \"39-year-old female who originally had laparoscopic appendectomy with Dr. Holly Meng.  He made a supraumbilical incision to do the appendectomy. She developed hernia at the site. Dr. Holly Meng has left our practice and she came back to see me. I took her to the OR in 2022 and made an incision and placed Ventralex mesh and this place was almost like repairing an umbilical hernia. The patient did well initially and began to have pain and she got the wound infection at the site. I thought her pain was related to the wound infection. This was left to heal by secondary intention and dressing changes and the wound eventually healed. She continued to have pain. She went and saw her primary care physician who ordered a CT scan which showed that the superior portion of the mesh had folded under and was not adherent to the undersurface of the anterior abdominal wall. She has continued to complain of pain at this site. I recommended a diagnostic laparoscopy, possible removal of the old mesh and placement of new mesh. The patient was agreeable, signed the consent form, was scheduled for 3/3/2023. \"      HPI:  Noreen Saldana is a 40y.o. year-old female who presents for a post-op visit s/p Diagnostic laparoscopy, laparoscopic lysis of adhesions, removal of old mesh, and placement of a new large Ventralex patch done per Dr. Cortez Manzo on 3/3/2023.       Path report:   Date Obtained:   3/3/2023   DIAGNOSIS        \"OLD MESH\":  BENIGN FIBROADIPOSE TISSUE WITH FOREIGN BODY GIANT CELL REACTION; MESH MATERIAL AS DESCRIBED BELOW.    label matching requisition and specimen consists of a fragment of synthetic mesh with attached soft tissue measuring approximately 6.5 x 5 x 2 cm. Dissection of the attached soft tissue reveals grossly unremarkable fibroadipose tissue with no discrete nodules or focal areas      Patient reports she id doing well, tolerating a regular diet, voiding without complications and having normal bowel movements. Patient denies fevers, chills, nausea or vomiting. Objective:   Review of Systems   Constitutional:  Negative for chills and fever. Respiratory: Negative. Cardiovascular: Negative. Gastrointestinal: Negative. Negative for abdominal pain, constipation, diarrhea, nausea and vomiting. Physical Exam  Constitutional:       Appearance: Normal appearance. Abdominal:      General: Bowel sounds are normal.      Palpations: Abdomen is soft. Comments: Mid line abdominal Incisional approximated with staples and no erythema or drainage. Every other staple removed and incision remains approximated. trochar sites are approximated with steri-strips  and no erythema or drainage   Musculoskeletal:         General: Normal range of motion. Skin:     General: Skin is warm. Capillary Refill: Capillary refill takes less than 2 seconds. Neurological:      General: No focal deficit present. Mental Status: She is alert and oriented to person, place, and time. Psychiatric:         Mood and Affect: Mood normal.         Behavior: Behavior normal.       Assessment/Plan:  40y.o. year-old female who presents for a post-op visit s/p Diagnostic laparoscopy, laparoscopic lysis of adhesions, removal of old mesh, and placement of a new large Ventralex patch done per Dr. Aiden Mayfield on 3/3/2023.   Path report given and discussed with patient.     -Follow up  3/22/2023  with MOA for staples to be removed  -No heavy lifting > 20 pounds x 4 weeks post op  -May return to work on 4/4/2023  -Resume diet as tolerated  -Keep incisional sites clean and dry  -No tub bath until trochar sites are completely healed    KAILA Danielson - NP

## 2023-03-14 NOTE — PATIENT INSTRUCTIONS
-Follow up  3/22/2023  with MOA for staples to be removed  -No heavy lifting > 20 pounds x 4 weeks post op  -May return to work on 4/4/2023  -Resume diet as tolerated  -Keep incisional sites clean and dry  -No tub bath until trochar sites are completely healed

## 2023-03-22 ENCOUNTER — OFFICE VISIT (OUTPATIENT)
Dept: SURGERY | Age: 38
End: 2023-03-22

## 2023-03-22 DIAGNOSIS — Z09 POSTOPERATIVE EXAMINATION: Primary | ICD-10-CM

## 2023-03-22 NOTE — PROGRESS NOTES
Patient presents today to have her remaining staples removed. Patient is s/p DIAGNOSTIC LAPAROSCOPY (Abdomen)   REMOVAL OF THE PREVIOUSLY PLACED HERNIA MESH, PLACEMENT OF NEW MESH (Abdomen) on 3/3/23. Patient expresses concern that if her staples are removed, her abdomen will pull apart again. I brought Aris Favre, [address], clinical supervisor in to look at the area for a second opinion. We made the decision to remove staples, but we would reinforce with steri strips. Patient was in agreement with this. All staples were removed by this CMA, and steri strips were applied.

## 2023-03-23 ENCOUNTER — TELEPHONE (OUTPATIENT)
Dept: SURGERY | Age: 38
End: 2023-03-23

## 2023-03-23 NOTE — TELEPHONE ENCOUNTER
Patient called stating that after her nurse visit yesterday, her incision \"opened back up\". Patient was instructed to keep the area clean, dry, and covered. Patient was asked if she could possibly send a photo through [address], so we can take a look at the area. Patient states that she will do that.

## 2023-03-28 NOTE — PROGRESS NOTES
poDate: 3/28/2023      Name: Leyda Bocanegra [facility]      MRN: [id number]       : 1985       Age: 40 y.o. Sex: female        Lala Zuniga MD       CC:  No chief complaint on file. HPI:  The patient presents for the second post-op visit s/p diagnostic laparoscopy, laparoscopic lysis of adhesions, removal of old mesh, and placement of a new large Ventralex patch done on 3/3/23.    3/30/23: Sultana Hue removed 3/22/23. Wound \"opened up\" on 3/23/23. Wound was Steri-stripped on 3/23. Wound completely open with no Steri-strips in place. Physical Exam:     There were no vitals taken for this visit. General: Alert, oriented, cooperative white female in no acute distress. Neck: Supple, trachea midline, no appreciable thyromegaly  Resp: Breathing is  non-labored. Lungs clear to auscultation without wheezing or rhonchi   CV: RRR. No murmurs, rubs or gallops appreciated. Abd: soft, supraumbilical wound completely open, no cellulitis. Assessment/Plan:  Everett Mendez is a 40 y.o. female who is s/p diagnostic laparoscopy, laparoscopic lysis of adhesions, removal of old mesh, and placement of a new large Ventralex patch done on 3/3/23. 1. Secondary wound closure in OR    2. Will leave sutures in place for 6 weeks. 3. Follow-up with only me after this next surgery.     Alberto Luong MD  [facility]   3/28/2023  8:40 AM

## 2023-03-30 ENCOUNTER — OFFICE VISIT (OUTPATIENT)
Dept: SURGERY | Age: 38
End: 2023-03-30

## 2023-03-30 ENCOUNTER — PREP FOR PROCEDURE (OUTPATIENT)
Dept: SURGERY | Age: 38
End: 2023-03-30

## 2023-03-30 DIAGNOSIS — S31.109A OPEN WOUND ANTERIOR ABDOMINAL WALL, INITIAL ENCOUNTER: Primary | ICD-10-CM

## 2023-03-30 PROCEDURE — 99024 POSTOP FOLLOW-UP VISIT: CPT | Performed by: SURGERY

## 2023-03-30 NOTE — PERIOP NOTE
Patient verified name and . Order for consent NOT found in EHR at time of PAT visit. Unable to verify case posting against order; surgery verified by patient. Type 1B surgery, PAT phone assessment complete. Orders not received. Labs per surgeon: unknown; no orders received    Labs per anesthesia protocol: Hgb ordered for walk-in lab. Chart flagged for charge nurse. Patient answered medical/surgical history questions at their best of ability. All prior to admission medications documented in EPIC. Patient instructed to take the following medications the day of surgery according to anesthesia guidelines with a small sip of water: Synthroid On the day before surgery please take Acetaminophen 1000mg in the morning and then again before bed. You may substitute for Tylenol 650 mg. Hold all vitamins 7 days prior to surgery and NSAIDS 5 days prior to surgery. Prescription meds to hold:phentermine 5 days  Patient instructed on the following:    > Arrive at [facility], time of arrival to be called the day before by 1700  > NPO after midnight, unless otherwise indicated, including gum, mints, and ice chips  > Responsible adult must drive patient to the hospital, stay during surgery, and patient will need supervision 24 hours after anesthesia  > Use antibacterial soap in shower the night before surgery and on the morning of surgery  > All piercings must be removed prior to arrival.    > Leave all valuables (money and jewelry) at home but bring insurance card and ID on DOS.   > You may be required to pay a deductible or co-pay on the day of your procedure. You can pre-pay by calling [phone] if your surgery is at the [facility] or [phone] if your surgery is at the [facility]. > Do not wear make-up, nail polish, lotions, cologne, perfumes, powders, or oil on skin. Artificial nails are not permitted.

## 2023-03-31 ENCOUNTER — TELEPHONE (OUTPATIENT)
Dept: SURGERY | Age: 38
End: 2023-03-31

## 2023-04-01 NOTE — H&P
poDate: 2023      Name: Reba Maya [facility]      MRN: [id number]       : 1985       Age: 40 y.o. Sex: female        Cindy Abad MD       CC:  No chief complaint on file. HPI:  The patient presents for the second post-op visit s/p diagnostic laparoscopy, laparoscopic lysis of adhesions, removal of old mesh, and placement of a new large Ventralex patch done on 3/3/23.    3/30/23: Curvin [place] removed 3/22/23. Wound \"opened up\" on 3/23/23. Wound was Steri-stripped on 3/23. Wound completely open with no Steri-strips in place. PMH:    Past Medical History:   Diagnosis Date    Abnormal Pap smear 2011 & 2015    HPV +    Abnormal Papanicolaou smear of cervix  &      2004    Anemia     in the past    Anxiety     Asthma     childhood, exercise induced as an adult    BMI 37.0-37.9, adult     BRCA1 negative     BRCA2 negative     Celiac disease     Depression     no meds    Endometriosis     Female dyspareunia     Genital herpes     Herpes simplex without mention of complication     Type 2    High-risk pregnancy in second trimester 2019    Flu vaccine given 10/16/19  2019 at [facility]:  Normal NT, NB present. Genetic counseling done by physician. Pt declines testing. May lift up to 45# at work at this time; limit if concerns develop 2019 at [facility]:  Normal anatomy/fetal Echo. Normal CL at 4.3 cm. Hx of PTL and delivery at 35 weeks in 2 pregnancies prior to last Twin Pregnancy.  10/3/2019 at [facility]: Normal CL 3.7 cm, no funneling    History of hyperthyroidism 2014    treated w/radiation    History of staph infection     Hypothyroidism     managed w/med    Infertility, female     Miscarriage     Open wound anterior abdominal wall, initial encounter 2023    Pelvic pain in female 10/08/2015    Postpartum depression     with last pregnancy-twins     labor in third trimester without delivery 10/16/2017    Ventral hernia without obstruction or gangrene     Open

## 2023-04-03 ENCOUNTER — HOSPITAL ENCOUNTER (OUTPATIENT)
Dept: LAB | Age: 38
Discharge: HOME | End: 2023-04-06
Payer: COMMERCIAL

## 2023-04-03 DIAGNOSIS — Z01.818 PRE-OP TESTING: ICD-10-CM

## 2023-04-03 LAB — HGB BLD-MCNC: 13.9 G/DL (ref 11.7–15.4)

## 2023-04-03 PROCEDURE — 85018 HEMOGLOBIN: CPT

## 2023-04-03 PROCEDURE — 36415 COLL VENOUS BLD VENIPUNCTURE: CPT

## 2023-04-06 ENCOUNTER — ANESTHESIA EVENT (OUTPATIENT)
Dept: SURGERY | Age: 38
End: 2023-04-06
Payer: COMMERCIAL

## 2023-04-07 ENCOUNTER — HOSPITAL ENCOUNTER (OUTPATIENT)
Age: 38
Setting detail: OUTPATIENT SURGERY
Discharge: HOME | End: 2023-04-07
Attending: SURGERY | Admitting: SURGERY
Payer: COMMERCIAL

## 2023-04-07 ENCOUNTER — ANESTHESIA (OUTPATIENT)
Dept: SURGERY | Age: 38
End: 2023-04-07
Payer: COMMERCIAL

## 2023-04-07 VITALS
WEIGHT: 224.6 LBS | OXYGEN SATURATION: 98 % | HEART RATE: 64 BPM | RESPIRATION RATE: 15 BRPM | TEMPERATURE: 97.5 F | HEIGHT: 65 IN | BODY MASS INDEX: 37.42 KG/M2 | SYSTOLIC BLOOD PRESSURE: 109 MMHG | DIASTOLIC BLOOD PRESSURE: 73 MMHG

## 2023-04-07 DIAGNOSIS — S31.109A OPEN WOUND ANTERIOR ABDOMINAL WALL, INITIAL ENCOUNTER: Primary | ICD-10-CM

## 2023-04-07 DIAGNOSIS — Z01.818 PRE-OP TESTING: ICD-10-CM

## 2023-04-07 PROCEDURE — 6370000000 HC RX 637 (ALT 250 FOR IP): Performed by: ANESTHESIOLOGY

## 2023-04-07 PROCEDURE — 7100000010 HC PHASE II RECOVERY - FIRST 15 MIN: Performed by: SURGERY

## 2023-04-07 PROCEDURE — 3700000000 HC ANESTHESIA ATTENDED CARE: Performed by: SURGERY

## 2023-04-07 PROCEDURE — 3600000002 HC SURGERY LEVEL 2 BASE: Performed by: SURGERY

## 2023-04-07 PROCEDURE — 7100000001 HC PACU RECOVERY - ADDTL 15 MIN: Performed by: SURGERY

## 2023-04-07 PROCEDURE — 2580000003 HC RX 258: Performed by: ANESTHESIOLOGY

## 2023-04-07 PROCEDURE — 6360000002 HC RX W HCPCS: Performed by: SURGERY

## 2023-04-07 PROCEDURE — 3600000012 HC SURGERY LEVEL 2 ADDTL 15MIN: Performed by: SURGERY

## 2023-04-07 PROCEDURE — 3700000001 HC ADD 15 MINUTES (ANESTHESIA): Performed by: SURGERY

## 2023-04-07 PROCEDURE — 2709999900 HC NON-CHARGEABLE SUPPLY: Performed by: SURGERY

## 2023-04-07 PROCEDURE — 6360000002 HC RX W HCPCS: Performed by: NURSE ANESTHETIST, CERTIFIED REGISTERED

## 2023-04-07 PROCEDURE — 2500000003 HC RX 250 WO HCPCS: Performed by: SURGERY

## 2023-04-07 PROCEDURE — 7100000000 HC PACU RECOVERY - FIRST 15 MIN: Performed by: SURGERY

## 2023-04-07 PROCEDURE — 88304 TISSUE EXAM BY PATHOLOGIST: CPT

## 2023-04-07 PROCEDURE — 2500000003 HC RX 250 WO HCPCS: Performed by: NURSE ANESTHETIST, CERTIFIED REGISTERED

## 2023-04-07 RX ORDER — SODIUM CHLORIDE, SODIUM LACTATE, POTASSIUM CHLORIDE, CALCIUM CHLORIDE 600; 310; 30; 20 MG/100ML; MG/100ML; MG/100ML; MG/100ML
INJECTION, SOLUTION INTRAVENOUS CONTINUOUS
Status: DISCONTINUED | OUTPATIENT
Start: 2023-04-07 | End: 2023-04-07 | Stop reason: HOSPADM

## 2023-04-07 RX ORDER — LIDOCAINE HYDROCHLORIDE 10 MG/ML
1 INJECTION, SOLUTION INFILTRATION; PERINEURAL
Status: DISCONTINUED | OUTPATIENT
Start: 2023-04-07 | End: 2023-04-07 | Stop reason: HOSPADM

## 2023-04-07 RX ORDER — LIDOCAINE HYDROCHLORIDE 20 MG/ML
INJECTION, SOLUTION EPIDURAL; INFILTRATION; INTRACAUDAL; PERINEURAL PRN
Status: DISCONTINUED | OUTPATIENT
Start: 2023-04-07 | End: 2023-04-07 | Stop reason: SDUPTHER

## 2023-04-07 RX ORDER — SCOLOPAMINE TRANSDERMAL SYSTEM 1 MG/1
1 PATCH, EXTENDED RELEASE TRANSDERMAL
Status: DISCONTINUED | OUTPATIENT
Start: 2023-04-07 | End: 2023-04-07 | Stop reason: HOSPADM

## 2023-04-07 RX ORDER — DEXAMETHASONE SODIUM PHOSPHATE 4 MG/ML
INJECTION, SOLUTION INTRA-ARTICULAR; INTRALESIONAL; INTRAMUSCULAR; INTRAVENOUS; SOFT TISSUE PRN
Status: DISCONTINUED | OUTPATIENT
Start: 2023-04-07 | End: 2023-04-07 | Stop reason: SDUPTHER

## 2023-04-07 RX ORDER — PROPOFOL 10 MG/ML
INJECTION, EMULSION INTRAVENOUS PRN
Status: DISCONTINUED | OUTPATIENT
Start: 2023-04-07 | End: 2023-04-07 | Stop reason: SDUPTHER

## 2023-04-07 RX ORDER — IPRATROPIUM BROMIDE AND ALBUTEROL SULFATE 2.5; .5 MG/3ML; MG/3ML
1 SOLUTION RESPIRATORY (INHALATION)
Status: DISCONTINUED | OUTPATIENT
Start: 2023-04-07 | End: 2023-04-07 | Stop reason: HOSPADM

## 2023-04-07 RX ORDER — PROCHLORPERAZINE EDISYLATE 5 MG/ML
5 INJECTION INTRAMUSCULAR; INTRAVENOUS
Status: DISCONTINUED | OUTPATIENT
Start: 2023-04-07 | End: 2023-04-07 | Stop reason: HOSPADM

## 2023-04-07 RX ORDER — SODIUM CHLORIDE 9 MG/ML
INJECTION, SOLUTION INTRAVENOUS PRN
Status: DISCONTINUED | OUTPATIENT
Start: 2023-04-07 | End: 2023-04-07 | Stop reason: HOSPADM

## 2023-04-07 RX ORDER — FENTANYL CITRATE 50 UG/ML
INJECTION, SOLUTION INTRAMUSCULAR; INTRAVENOUS PRN
Status: DISCONTINUED | OUTPATIENT
Start: 2023-04-07 | End: 2023-04-07 | Stop reason: SDUPTHER

## 2023-04-07 RX ORDER — OXYCODONE HYDROCHLORIDE AND ACETAMINOPHEN 5; 325 MG/1; MG/1
1 TABLET ORAL EVERY 6 HOURS PRN
Qty: 20 TABLET | Refills: 0 | Status: SHIPPED | OUTPATIENT
Start: 2023-04-07 | End: 2023-04-12

## 2023-04-07 RX ORDER — SODIUM CHLORIDE 0.9 % (FLUSH) 0.9 %
5-40 SYRINGE (ML) INJECTION PRN
Status: DISCONTINUED | OUTPATIENT
Start: 2023-04-07 | End: 2023-04-07 | Stop reason: HOSPADM

## 2023-04-07 RX ORDER — MIDAZOLAM HYDROCHLORIDE 1 MG/ML
INJECTION INTRAMUSCULAR; INTRAVENOUS PRN
Status: DISCONTINUED | OUTPATIENT
Start: 2023-04-07 | End: 2023-04-07 | Stop reason: SDUPTHER

## 2023-04-07 RX ORDER — SODIUM CHLORIDE 0.9 % (FLUSH) 0.9 %
5-40 SYRINGE (ML) INJECTION EVERY 12 HOURS SCHEDULED
Status: DISCONTINUED | OUTPATIENT
Start: 2023-04-07 | End: 2023-04-07 | Stop reason: HOSPADM

## 2023-04-07 RX ORDER — ONDANSETRON 2 MG/ML
4 INJECTION INTRAMUSCULAR; INTRAVENOUS
Status: DISCONTINUED | OUTPATIENT
Start: 2023-04-07 | End: 2023-04-07 | Stop reason: HOSPADM

## 2023-04-07 RX ORDER — FENTANYL CITRATE 50 UG/ML
100 INJECTION, SOLUTION INTRAMUSCULAR; INTRAVENOUS
Status: DISCONTINUED | OUTPATIENT
Start: 2023-04-07 | End: 2023-04-07 | Stop reason: HOSPADM

## 2023-04-07 RX ORDER — ONDANSETRON 2 MG/ML
INJECTION INTRAMUSCULAR; INTRAVENOUS PRN
Status: DISCONTINUED | OUTPATIENT
Start: 2023-04-07 | End: 2023-04-07 | Stop reason: SDUPTHER

## 2023-04-07 RX ORDER — HALOPERIDOL 5 MG/ML
1 INJECTION INTRAMUSCULAR
Status: DISCONTINUED | OUTPATIENT
Start: 2023-04-07 | End: 2023-04-07 | Stop reason: HOSPADM

## 2023-04-07 RX ORDER — OXYCODONE HYDROCHLORIDE 5 MG/1
5 TABLET ORAL
Status: COMPLETED | OUTPATIENT
Start: 2023-04-07 | End: 2023-04-07

## 2023-04-07 RX ORDER — GLYCOPYRROLATE 0.2 MG/ML
INJECTION INTRAMUSCULAR; INTRAVENOUS PRN
Status: DISCONTINUED | OUTPATIENT
Start: 2023-04-07 | End: 2023-04-07 | Stop reason: SDUPTHER

## 2023-04-07 RX ORDER — HYDROMORPHONE HYDROCHLORIDE 2 MG/ML
0.5 INJECTION, SOLUTION INTRAMUSCULAR; INTRAVENOUS; SUBCUTANEOUS EVERY 5 MIN PRN
Status: DISCONTINUED | OUTPATIENT
Start: 2023-04-07 | End: 2023-04-07 | Stop reason: HOSPADM

## 2023-04-07 RX ORDER — ACETAMINOPHEN 500 MG
1000 TABLET ORAL ONCE
Status: COMPLETED | OUTPATIENT
Start: 2023-04-07 | End: 2023-04-07

## 2023-04-07 RX ORDER — MIDAZOLAM HYDROCHLORIDE 2 MG/2ML
2 INJECTION, SOLUTION INTRAMUSCULAR; INTRAVENOUS
Status: DISCONTINUED | OUTPATIENT
Start: 2023-04-07 | End: 2023-04-07 | Stop reason: HOSPADM

## 2023-04-07 RX ORDER — BUPIVACAINE HYDROCHLORIDE 2.5 MG/ML
INJECTION, SOLUTION EPIDURAL; INFILTRATION; INTRACAUDAL PRN
Status: DISCONTINUED | OUTPATIENT
Start: 2023-04-07 | End: 2023-04-07 | Stop reason: HOSPADM

## 2023-04-07 RX ADMIN — PROPOFOL 200 MG: 10 INJECTION, EMULSION INTRAVENOUS at 10:57

## 2023-04-07 RX ADMIN — DEXAMETHASONE SODIUM PHOSPHATE 4 MG: 4 INJECTION, SOLUTION INTRAMUSCULAR; INTRAVENOUS at 11:01

## 2023-04-07 RX ADMIN — MIDAZOLAM 2 MG: 1 INJECTION INTRAMUSCULAR; INTRAVENOUS at 10:47

## 2023-04-07 RX ADMIN — FENTANYL CITRATE 25 MCG: 50 INJECTION, SOLUTION INTRAMUSCULAR; INTRAVENOUS at 11:04

## 2023-04-07 RX ADMIN — GLYCOPYRROLATE 0.2 MG: 0.2 INJECTION INTRAMUSCULAR; INTRAVENOUS at 11:04

## 2023-04-07 RX ADMIN — ACETAMINOPHEN 1000 MG: 500 TABLET, FILM COATED ORAL at 09:54

## 2023-04-07 RX ADMIN — FENTANYL CITRATE 25 MCG: 50 INJECTION, SOLUTION INTRAMUSCULAR; INTRAVENOUS at 11:24

## 2023-04-07 RX ADMIN — FENTANYL CITRATE 50 MCG: 50 INJECTION, SOLUTION INTRAMUSCULAR; INTRAVENOUS at 11:09

## 2023-04-07 RX ADMIN — ONDANSETRON 4 MG: 2 INJECTION INTRAMUSCULAR; INTRAVENOUS at 11:01

## 2023-04-07 RX ADMIN — Medication 2000 MG: at 11:01

## 2023-04-07 RX ADMIN — LIDOCAINE HYDROCHLORIDE 10 MG: 20 INJECTION, SOLUTION EPIDURAL; INFILTRATION; INTRACAUDAL; PERINEURAL at 10:57

## 2023-04-07 RX ADMIN — SODIUM CHLORIDE, POTASSIUM CHLORIDE, SODIUM LACTATE AND CALCIUM CHLORIDE: 600; 310; 30; 20 INJECTION, SOLUTION INTRAVENOUS at 09:48

## 2023-04-07 RX ADMIN — OXYCODONE 5 MG: 5 TABLET ORAL at 12:27

## 2023-04-07 ASSESSMENT — PAIN - FUNCTIONAL ASSESSMENT
PAIN_FUNCTIONAL_ASSESSMENT: 0-10
PAIN_FUNCTIONAL_ASSESSMENT: 0-10

## 2023-04-07 NOTE — BRIEF OP NOTE
Brief Postoperative Note      Patient: Corrinne Rough Fults  YOB: 1985  MRN: [id number]    Date of Procedure: 4/7/2023    Pre-Op Diagnosis: Open wound anterior abdominal wall, initial encounter [S31.109A]    Post-Op Diagnosis: Same       Procedure(s):  SECONDARY ABDOMINAL WOUND CLOSURE    Surgeon(s):  Shimon Garcias MD    Assistant:  * No surgical staff found *    Anesthesia: General plus local    Estimated Blood Loss (mL): Minimal    Complications: None    Specimens:   ID Type Source Tests Collected by Time Destination   A : Anterior abdominal wall Tissue Abdomen SURGICAL PATHOLOGY Shimon Garcias MD 4/7/2023 1115        Implants:  * No implants in log *      Drains: * No LDAs found *    Findings: See dictated note.     Electronically signed by Jairon Adams MD on 4/7/2023 at 11:25 AM

## 2023-04-07 NOTE — DISCHARGE INSTRUCTIONS
CALL YOUR DOCTOR IF   Excessive bleeding that does not stop after holding pressure over the area. Temperature of 101 degrees F or above. Excessive redness, swelling or bruising, and/or green or yellow, smelly discharge from incision. After general anesthesia or intravenous sedation, for 24 hours or while taking prescription Narcotics:  Limit your activities  A responsible adult needs to be with you for the next 24 hours  Do not drive and operate hazardous machinery  Do not make important personal or business decisions  Do not drink alcoholic beverages  If you have not urinated within 8 hours after discharge, and you are experiencing discomfort from urinary retention, please go to the nearest ED. If you have sleep apnea and have a CPAP machine, please use it for all naps and sleeping. Please use caution when taking narcotics and any of your home medications that may cause drowsiness. *  Please give a list of your current medications to your Primary Care Provider. *  Please update this list whenever your medications are discontinued, doses are      changed, or new medications (including over-the-counter products) are added. *  Please carry medication information at all times in case of emergency situations. These are general instructions for a healthy lifestyle:  No smoking/ No tobacco products/ Avoid exposure to second hand smoke  Surgeon General's Warning:  Quitting smoking now greatly reduces serious risk to your health. Obesity, smoking, and sedentary lifestyle greatly increases your risk for illness  A healthy diet, regular physical exercise & weight monitoring are important for maintaining a healthy lifestyle    You may be retaining fluid if you have a history of heart failure or if you experience any of the following symptoms:  Weight gain of 3 pounds or more overnight or 5 pounds in a week, increased swelling in our hands or feet or shortness of breath while lying flat in bed.   Please call

## 2023-04-07 NOTE — ANESTHESIA PRE PROCEDURE
2019 at [facility]:  Normal anatomy/fetal Echo. Normal CL at 4.3 cm. Hx of PTL and delivery at 35 weeks in 2 pregnancies prior to last Twin Pregnancy. 10/3/2019 at [facility]: Normal CL 3.7 cm, no funneling    History of hyperthyroidism 2014    treated w/radiation    History of staph infection     Hypothyroidism     managed w/med    Infertility, female    [de-identified] Miscarriage     Open wound anterior abdominal wall, initial encounter 2023    Pelvic pain in female 10/08/2015    Postpartum depression     with last pregnancy-twins     labor in third trimester without delivery 10/16/2017    Ventral hernia without obstruction or gangrene     Open ventral hernia repair with mesh using a large Ventralex mesh. Margi Bailey MD 6/10/22       Past Surgical History:        Procedure Laterality Date    ABDOMEN SURGERY N/A 3/3/2023    REMOVAL OF THE PREVIOUSLY PLACED HERNIA MESH, PLACEMENT OF NEW MESH performed by Shimon Garcias MD at [address] [place] [address]  2021    BREAST ENHANCEMENT SURGERY Bilateral 2008     SECTION  10/16/2017,     Twins- Male    COLPOSCOPY      HERNIA REPAIR      HYSTERECTOMY (CERVIX STATUS UNKNOWN)  2021    LAPAROSCOPY N/A 3/3/2023    DIAGNOSTIC LAPAROSCOPY performed by Shimon Garcias MD at [address] [place] [address]      OTHER SURGICAL HISTORY      Fulguration of Endometriosis    OTHER SURGICAL HISTORY  2015    Bx to intestines (celiac dz)    PELVIC LAPAROSCOPY  , ,     VENTRAL HERNIA REPAIR N/A 06/10/2022    HERNIA VENTRAL REPAIR WIHT MESH performed by Shimon [facility]ias MD at [facility]  2007       Social History:    Social History     Tobacco Use    Smoking status: Never    Smokeless tobacco: Never   Substance Use Topics    Alcohol use: Not Currently                                Counseling given: Not Answered      Vital Signs (Current): There were no vitals filed for this visit.

## 2023-04-07 NOTE — ANESTHESIA POSTPROCEDURE EVALUATION
Department of Anesthesiology  Postprocedure Note    Patient: Sarah Mansfield  MRN: [id number]  YOB: 1985  Date of evaluation: 4/7/2023      Procedure Summary     Date: 04/07/23 Room / Location: [facility] MAIN OR 03 / [facility] MAIN OR    Anesthesia Start: 9990 Anesthesia Stop: 1134    Procedure: SECONDARY ABDOMINAL WOUND CLOSURE (Abdomen) Diagnosis:       Open wound anterior abdominal wall, initial encounter      (Open wound anterior abdominal wall, initial encounter [S31.109A])    Providers: Uche Phillips MD Responsible Provider: Barb Cordero MD    Anesthesia Type: General ASA Status: 2          Anesthesia Type: General    Colby Phase I: Colby Score: 8    Colby Phase II:        Anesthesia Post Evaluation    Patient location during evaluation: PACU  Patient participation: complete - patient participated  Level of consciousness: awake and alert  Pain score: 2  Airway patency: patent  Nausea & Vomiting: no nausea  Complications: no  Cardiovascular status: blood pressure returned to baseline and hemodynamically stable  Respiratory status: acceptable  Hydration status: euvolemic  Multimodal analgesia pain management approach  There was medical reason for not using a multimodal analgesia pain management approach.

## 2023-04-07 NOTE — OP NOTE
[facility]  OPERATIVE REPORT    Name:  Ambreen Rene  MR#:  [id number]  :  1985  ACCOUNT #:  [de-identified]  DATE OF SERVICE:  2023    PREOPERATIVE DIAGNOSIS:  Open anterior abdominal wall wound. POSTOPERATIVE DIAGNOSIS:  Open anterior abdominal wall wound. PROCEDURE PERFORMED:  Secondary closure of an anterior abdominal wall wound measuring 6 cm x 3 cm x 1 cm. SURGEON:  Maria De Jesus Diop MD    ASSISTANT:  None. ANESTHESIA:  General anesthesia via LMA with Dr. Cori Sher. COMPLICATIONS:  None. SPECIMENS REMOVED:  Soft tissue from mid portion of the wound was excised and sent to Pathology for evaluation. IMPLANTS:  None. ESTIMATED BLOOD LOSS:  Less than 5 mL. DRAINS:  None. HISTORY:  This is a 40-year-old female who originally had a laparoscopic appendectomy done by Dr. Roma Jaing.  She came back to our office after Roma Jiang had left to go to another practice with a hernia at the supraumbilical site. She was taken to surgery where a ventral hernia repair was done with mesh. I did it similar to an umbilical hernia repair. Unfortunately, the mesh rolled up on itself and she was having pain at the site. She came back to the office numerous times complaining of pain and after six months of complaints, we took her back to surgery where I did a diagnostic laparoscopy, laparoscopic lysis of adhesions, removal of old mesh and placement of a new Ventralex mesh that was done on 2023. The pain she was having prior to surgery was gone. She had staples placed in the wound and we left them in place until 2023 for 19 days. This is an otherwise healthy 40year-old. We removed the staples at 19 days. She called the office on  and said that the midportion of wound had a small gap. She was seen by two of my medical assistants and they put Mastisol on the sides of the wound and extensively Steri-Stripped it.   She called back several days later and sent a

## 2023-04-18 NOTE — PROGRESS NOTES
poDate: 2023      Name: Geoffrey Acosta [facility]      MRN: [id number]       : 1985       Age: 45 y.o. Sex: female        Mike García MD       CC:  No chief complaint on file. HPI:  The patient presents for the first post-op visit s/p secondary closure of an anterior abdominal wall wound done on 23. The pathology showed:    Date Obtained:   2023   DIAGNOSIS        \"ANTERIOR ABDOMINAL WALL\":             BENIGN SKIN AND SOFT TISSUE WITH ACUTE INFLAMMATION AND FAT                  NECROSIS. Wound intact. Skin \"itches\" around the sutures. Physical Exam:     There were no vitals taken for this visit. General: Alert, oriented, cooperative white female in no acute distress. Neck: Supple, trachea midline, no appreciable thyromegaly  Resp: Breathing is  non-labored. Lungs clear to auscultation without wheezing or rhonchi   CV: RRR. No murmurs, rubs or gallops appreciated. Abd: soft, wound intact. No cellulitis or drainage. Assessment/Plan:  Katie Read is a 45 y.o. female who is s/p secondary closure of an anterior abdominal wall wound done on 23. 1. Leave sutures    2. Hydrocortisone cream around incision for itching. 3. Follow-up in 3 weeks.      Angel Samuels MD  [facility]   2023  1:28 PM

## 2023-04-20 ENCOUNTER — OFFICE VISIT (OUTPATIENT)
Dept: SURGERY | Age: 38
End: 2023-04-20

## 2023-04-20 DIAGNOSIS — S31.109A OPEN WOUND ANTERIOR ABDOMINAL WALL, INITIAL ENCOUNTER: Primary | ICD-10-CM

## 2023-04-20 PROCEDURE — 99024 POSTOP FOLLOW-UP VISIT: CPT | Performed by: SURGERY

## 2023-04-23 ENCOUNTER — TELEPHONE (OUTPATIENT)
Dept: SURGERY | Age: 38
End: 2023-04-23

## 2023-04-23 RX ORDER — AMOXICILLIN AND CLAVULANATE POTASSIUM 875; 125 MG/1; MG/1
1 TABLET, FILM COATED ORAL 2 TIMES DAILY
Qty: 14 TABLET | Refills: 0 | Status: SHIPPED | OUTPATIENT
Start: 2023-04-23 | End: 2023-04-30

## 2023-05-03 ENCOUNTER — TELEPHONE (OUTPATIENT)
Dept: SURGERY | Age: 38
End: 2023-05-03

## 2023-05-03 RX ORDER — SULFAMETHOXAZOLE AND TRIMETHOPRIM 800; 160 MG/1; MG/1
1 TABLET ORAL 2 TIMES DAILY
Qty: 14 TABLET | Refills: 0 | Status: SHIPPED | OUTPATIENT
Start: 2023-05-03 | End: 2023-05-10

## 2023-05-03 NOTE — PROGRESS NOTES
poDate: 5/3/2023      Name: Thomas Neil [facility]      MRN: [id number]       : 1985       Age: 45 y.o. Sex: female        Nazanin Guerrero MD       CC:  No chief complaint on file. HPI:  The patient presents for the secondpost-op visit s/p secondary closure of an anterior abdominal wall wound done on 23. The pathology showed:    Date Obtained:   2023   DIAGNOSIS        \"ANTERIOR ABDOMINAL WALL\":             BENIGN SKIN AND SOFT TISSUE WITH ACUTE INFLAMMATION AND FAT                  NECROSIS.     23: Wound intact. Skin \"itches\" around the sutures. 23: The patient reports an open area at the top of the wound, this on POD #27. She says she feels fatigued and has low grade temperatures. She has some abdominal cramping. She is concerned about MRSA and wanted a culture. I did not think that was a good idea because all it would show wound be skin dominik. She is on Bactrim, which should cover MRSA, if she has it. Physical Exam:     There were no vitals taken for this visit. General: Alert, oriented, cooperative white female in no acute distress. Neck: Supple, trachea midline, no appreciable thyromegaly  Resp: Breathing is  non-labored. Lungs clear to auscultation without wheezing or rhonchi   CV: RRR. No murmurs, rubs or gallops appreciated. Abd: soft, cephalad portion of the wound is open with the suture having torn through. The area looks traumatized. There was no drainage and no surrounding cellulitis. All of the other sutures are intact. Assessment/Plan:  Matt Sadler is a 45 y.o. female who is s/p secondary closure of an anterior abdominal wall wound done on 23. The patient has an open area at the top of the wound that looks traumatized. I do not understand how a healthy 45year old female has such wound problems. I have had several patients today in my office that are twice her age that have healing wounds just 2 weeks after surgery.     1. Top suture

## 2023-05-03 NOTE — TELEPHONE ENCOUNTER
Appt made for tomorrow 5/4/2023.    ----- Message from KAILA Finney NP sent at 5/3/2023  9:04 AM EDT -----  Regarding: RE: Condition of incision   Contact: [phone]  She would need to be seen per Dr. Cathy Franz as that would be a patient that may need further surgical intervention so add her on his schedule for tomorrow. I will send in an additional antibiotic for her to start . [address]  ----- Message -----  From: Yahir Noel MA  Sent: 5/3/2023   8:56 AM EDT  To: KAILA Finney NP  Subject: FW: Condition of incision                        Can you see her today, she should have finished her antibiotics on 4/30/23. It still looks infected by the picture.  ----- Message -----  From: Hubert Valentine  Sent: 5/3/2023   7:04 AM EDT  To: , #  Subject: Condition of incision                            I have finished the antibiotics for the infection and I'm still getting puss from around the stitches. Also, as you can see the top stitch has come loose and my incision is open.

## 2023-05-03 NOTE — PROGRESS NOTES
Patient sent message and Picture  of abdominal incisional area and even though she completed oral antibiotic 4/30/2023, based on picture of incisional area, I have e-scribed Bactrim DS one PO BID x 7 days and instructed MOA to add her to Dr. Ap Rowell schedule for 5/4/2023 to be seen. Patient had secondary closure of an anterior abdominal wall wound done on 4/7/23. The pathology showed:     Date Obtained:   4/7/2023   DIAGNOSIS        \"ANTERIOR ABDOMINAL WALL\":             BENIGN SKIN AND SOFT TISSUE WITH ACUTE INFLAMMATION AND FAT  NECROSIS. She was seen on 4/20/2023 per Dr. Josep Berg and was instructed to follow up in 3 weeks but due to appearance of incisional area she will be added to Dr. Josep Berg schedule for 5/4/2023.

## 2023-05-04 ENCOUNTER — OFFICE VISIT (OUTPATIENT)
Dept: SURGERY | Age: 38
End: 2023-05-04

## 2023-05-04 DIAGNOSIS — S31.109A OPEN WOUND ANTERIOR ABDOMINAL WALL, INITIAL ENCOUNTER: Primary | ICD-10-CM

## 2023-05-04 PROCEDURE — 99024 POSTOP FOLLOW-UP VISIT: CPT | Performed by: SURGERY

## 2023-05-15 ENCOUNTER — OFFICE VISIT (OUTPATIENT)
Dept: SURGERY | Age: 38
End: 2023-05-15

## 2023-05-15 DIAGNOSIS — K43.9 VENTRAL HERNIA WITHOUT OBSTRUCTION OR GANGRENE: ICD-10-CM

## 2023-05-15 DIAGNOSIS — S31.109A OPEN WOUND ANTERIOR ABDOMINAL WALL, INITIAL ENCOUNTER: Primary | ICD-10-CM

## 2023-05-15 DIAGNOSIS — E66.9 OBESITY (BMI 30-39.9): ICD-10-CM

## 2023-05-15 PROCEDURE — 99024 POSTOP FOLLOW-UP VISIT: CPT | Performed by: SURGERY

## 2023-05-31 NOTE — PROGRESS NOTES
poDate: 2023      Name: Sanjeev Kathleen [facility]      MRN: [id number]       : 1985       Age: 45 y.o. Sex: female        Jason Kerr MD       CC:  No chief complaint on file. HPI:  The patient presents for the fourth post-op visit s/p secondary closure of an anterior abdominal wall wound done on 23. The pathology showed:    Date Obtained:   2023   DIAGNOSIS        \"ANTERIOR ABDOMINAL WALL\":             BENIGN SKIN AND SOFT TISSUE WITH ACUTE INFLAMMATION AND FAT                  NECROSIS.     23: Wound intact. Skin \"itches\" around the sutures. 23: The patient reports an open area at the top of the wound, this on POD #27. She says she feels fatigued and has low grade temperatures. She has some abdominal cramping. She is concerned about MRSA and wanted a culture. I did not think that was a good idea because all it would show wound be skin dominik. She is on Bactrim, which should cover MRSA, if she has it. 5/15/23: Today is POD #38. Went to [facility] at [place] where she was told she had a staph infection. She was placed on Keflex. No new problems    23: Open area nearly closed. Physical Exam:     There were no vitals taken for this visit. General: Alert, oriented, cooperative white female in no acute distress. Neck: Supple, trachea midline, no appreciable thyromegaly  Resp: Breathing is  non-labored. Lungs clear to auscultation without wheezing or rhonchi   CV: RRR. No murmurs, rubs or gallops appreciated. Abd: soft, open area superior part of wound nearly closed, active BS'S. Assessment/Plan:  Melony Oseguera is a 45 y.o. female who is s/p secondary closure of an anterior abdominal wall wound done on 23. The patient has an open area at the top of the wound that looks traumatized. I do not understand how a healthy 45year old female has such wound problems. Silver nitrate to open area. 2.   F/U prn.          Sam Longoria MD  FACS

## 2023-06-01 ENCOUNTER — OFFICE VISIT (OUTPATIENT)
Dept: SURGERY | Age: 38
End: 2023-06-01

## 2023-06-01 DIAGNOSIS — K43.9 VENTRAL HERNIA WITHOUT OBSTRUCTION OR GANGRENE: ICD-10-CM

## 2023-06-01 DIAGNOSIS — S31.109A OPEN WOUND ANTERIOR ABDOMINAL WALL, INITIAL ENCOUNTER: Primary | ICD-10-CM

## 2023-06-01 PROCEDURE — 99024 POSTOP FOLLOW-UP VISIT: CPT | Performed by: SURGERY

## 2023-06-28 RX ORDER — VALACYCLOVIR HYDROCHLORIDE 500 MG/1
500 TABLET, FILM COATED ORAL 2 TIMES DAILY
Qty: 30 TABLET | Refills: 3 | Status: SHIPPED | OUTPATIENT
Start: 2023-06-28

## 2023-06-28 RX ORDER — VALACYCLOVIR HYDROCHLORIDE 500 MG/1
500 TABLET, FILM COATED ORAL 2 TIMES DAILY
Qty: 30 TABLET | Refills: 3 | Status: CANCELLED | OUTPATIENT
Start: 2023-06-28

## 2023-10-25 ENCOUNTER — HOSPITAL ENCOUNTER (OUTPATIENT)
Dept: NUCLEAR MEDICINE | Age: 38
Discharge: HOME | End: 2023-10-28
Attending: FAMILY MEDICINE
Payer: COMMERCIAL

## 2023-10-25 DIAGNOSIS — R10.33 ABDOMINAL PAIN, ACUTE, PERIUMBILICAL: ICD-10-CM

## 2023-10-25 PROCEDURE — A9537 TC99M MEBROFENIN: HCPCS | Performed by: FAMILY MEDICINE

## 2023-10-25 PROCEDURE — 6360000004 HC RX CONTRAST MEDICATION: Performed by: FAMILY MEDICINE

## 2023-10-25 PROCEDURE — 3430000000 HC RX DIAGNOSTIC RADIOPHARMACEUTICAL: Performed by: FAMILY MEDICINE

## 2023-10-25 PROCEDURE — 78227 HEPATOBIL SYST IMAGE W/DRUG: CPT

## 2023-10-25 RX ORDER — KIT FOR THE PREPARATION OF TECHNETIUM TC 99M MEBROFENIN 45 MG/10ML
6 INJECTION, POWDER, LYOPHILIZED, FOR SOLUTION INTRAVENOUS
Status: COMPLETED | OUTPATIENT
Start: 2023-10-25 | End: 2023-10-25

## 2023-10-25 RX ORDER — SINCALIDE 5 UG/5ML
0.02 INJECTION, POWDER, LYOPHILIZED, FOR SOLUTION INTRAVENOUS ONCE
Status: COMPLETED | OUTPATIENT
Start: 2023-10-25 | End: 2023-10-25

## 2023-10-25 RX ADMIN — MEBROFENIN 6 MILLICURIE: 45 INJECTION, POWDER, LYOPHILIZED, FOR SOLUTION INTRAVENOUS at 10:30

## 2023-10-25 RX ADMIN — SINCALIDE 2 MCG: 5 INJECTION, POWDER, LYOPHILIZED, FOR SOLUTION INTRAVENOUS at 11:00

## 2024-02-01 ENCOUNTER — TRANSCRIBE ORDERS (OUTPATIENT)
Dept: SCHEDULING | Age: 39
End: 2024-02-01

## 2024-02-01 DIAGNOSIS — Z12.31 ENCOUNTER FOR SCREENING MAMMOGRAM FOR BREAST CANCER: Primary | ICD-10-CM

## 2024-03-19 ENCOUNTER — OFFICE VISIT (OUTPATIENT)
Dept: OBGYN CLINIC | Age: 39
End: 2024-03-19
Payer: COMMERCIAL

## 2024-03-19 VITALS
HEIGHT: 65 IN | SYSTOLIC BLOOD PRESSURE: 114 MMHG | DIASTOLIC BLOOD PRESSURE: 70 MMHG | BODY MASS INDEX: 36.82 KG/M2 | WEIGHT: 221 LBS

## 2024-03-19 DIAGNOSIS — Z01.419 WELL WOMAN EXAM: Primary | ICD-10-CM

## 2024-03-19 DIAGNOSIS — Z13.89 SCREENING FOR GENITOURINARY CONDITION: ICD-10-CM

## 2024-03-19 DIAGNOSIS — Z12.31 SCREENING MAMMOGRAM FOR BREAST CANCER: ICD-10-CM

## 2024-03-19 DIAGNOSIS — Z80.3 FAMILY HISTORY OF BREAST CANCER: ICD-10-CM

## 2024-03-19 LAB
BILIRUBIN, URINE, POC: NEGATIVE
BLOOD URINE, POC: NEGATIVE
GLUCOSE URINE, POC: NEGATIVE
KETONES, URINE, POC: NEGATIVE
LEUKOCYTE ESTERASE, URINE, POC: NEGATIVE
NITRITE, URINE, POC: NEGATIVE
PH, URINE, POC: 8.5 (ref 4.6–8)
PROTEIN,URINE, POC: ABNORMAL
SPECIFIC GRAVITY, URINE, POC: 1.02 (ref 1–1.03)
URINALYSIS CLARITY, POC: CLEAR
URINALYSIS COLOR, POC: YELLOW
UROBILINOGEN, POC: ABNORMAL

## 2024-03-19 PROCEDURE — 99395 PREV VISIT EST AGE 18-39: CPT | Performed by: OBSTETRICS & GYNECOLOGY

## 2024-03-19 PROCEDURE — 81002 URINALYSIS NONAUTO W/O SCOPE: CPT | Performed by: OBSTETRICS & GYNECOLOGY

## 2024-03-19 NOTE — PROGRESS NOTES
Error   
file    Highest education level: Not on file   Occupational History    Works for [employer]    Tobacco Use    Smoking status: Never    Smokeless tobacco: Never   Vaping Use    Vaping Use: Never used   Substance and Sexual Activity    Alcohol use: Not Currently    Drug use: No    Sexual activity: Yes     Partners: Male     Birth control/protection: Surgical   Other Topics Concern    Not on file   Social History Narrative    Not on file     Social Determinants of Health     Financial Resource Strain: Not on file   Food Insecurity: Not on file   Transportation Needs: Not on file   Physical Activity: Not on file   Stress: Not on file   Social Connections: Not on file   Intimate Partner Violence: Not on file   Housing Stability: Not on file     Family History   Problem Relation Age of Onset    Depression Mother     Breast Cancer Mother 60    Other Father         Cleft Palate    Heart Defect Sister         Hole in Heart (closed)    Stroke Maternal Grandmother     Stroke Maternal Grandfather     Cancer Maternal Grandfather         brain cancer    Ovarian Cancer Paternal Grandmother     Breast Cancer Paternal Grandmother 38    Alzheimer's Disease Paternal Grandmother     Heart Disease Paternal Grandfather     Stroke Maternal Uncle     Thyroid Disease Maternal Uncle     Prostate Cancer Maternal Uncle     Heart Disease Maternal Uncle     Breast Cancer Paternal Aunt 60        Twice    Thyroid Disease Other         hyperthyroidism    Thyroid Disease Other         hyperthyroidism       Review of Systems  Neg except hpi        /70   Ht 1.651 m (5' 5\")   Wt 100.2 kg (221 lb)   BMI 36.78 kg/m²        Physical Exam  Constitutional:       General: She is not in acute distress.     Appearance: She is well-developed.   HENT:      Head: Normocephalic and atraumatic.   Neck:      Thyroid: No thyroid mass or thyromegaly.   Cardiovascular:      Rate and Rhythm: Normal rate and regular rhythm.   Pulmonary:      Effort: Pulmonary effort is

## 2024-04-10 ENCOUNTER — HOSPITAL ENCOUNTER (OUTPATIENT)
Dept: MAMMOGRAPHY | Age: 39
Discharge: HOME | End: 2024-04-13
Attending: OBSTETRICS & GYNECOLOGY
Payer: COMMERCIAL

## 2024-04-10 DIAGNOSIS — Z12.31 ENCOUNTER FOR SCREENING MAMMOGRAM FOR BREAST CANCER: ICD-10-CM

## 2024-04-10 PROCEDURE — 77063 BREAST TOMOSYNTHESIS BI: CPT

## 2024-09-30 RX ORDER — VALACYCLOVIR HYDROCHLORIDE 500 MG/1
500 TABLET, FILM COATED ORAL 2 TIMES DAILY
Qty: 30 TABLET | Refills: 3 | Status: SHIPPED | OUTPATIENT
Start: 2024-09-30

## 2024-10-23 DIAGNOSIS — Z80.3 FAMILY HISTORY OF BREAST CANCER: ICD-10-CM

## 2024-10-23 DIAGNOSIS — R92.323 SCATTERED FIBROGLANDULAR TISSUE DENSITY OF BOTH BREASTS ON MAMMOGRAPHY: Primary | ICD-10-CM

## 2024-11-05 ENCOUNTER — HOSPITAL ENCOUNTER (OUTPATIENT)
Dept: MRI IMAGING | Age: 39
Discharge: HOME | End: 2024-11-08
Attending: OBSTETRICS & GYNECOLOGY
Payer: COMMERCIAL

## 2024-11-05 DIAGNOSIS — R92.323 SCATTERED FIBROGLANDULAR TISSUE DENSITY OF BOTH BREASTS ON MAMMOGRAPHY: ICD-10-CM

## 2024-11-05 DIAGNOSIS — Z80.3 FAMILY HISTORY OF BREAST CANCER: ICD-10-CM

## 2024-11-05 PROCEDURE — C8908 MRI W/O FOL W/CONT, BREAST,: HCPCS

## 2024-11-05 PROCEDURE — 6360000004 HC RX CONTRAST MEDICATION: Performed by: OBSTETRICS & GYNECOLOGY

## 2024-11-05 PROCEDURE — A9579 GAD-BASE MR CONTRAST NOS,1ML: HCPCS | Performed by: OBSTETRICS & GYNECOLOGY

## 2024-11-05 RX ADMIN — GADOTERIDOL 21 ML: 279.3 INJECTION, SOLUTION INTRAVENOUS at 18:09

## 2025-03-27 ENCOUNTER — TRANSCRIBE ORDERS (OUTPATIENT)
Dept: SCHEDULING | Age: 40
End: 2025-03-27

## 2025-03-27 DIAGNOSIS — Z12.31 OTHER SCREENING MAMMOGRAM: Primary | ICD-10-CM

## 2025-04-02 NOTE — PROGRESS NOTES
HPI:  Ms. Parada is a 39 y.o. female  who is here today for a well woman exam. She complains of nothing.  She and her common law  and going to counseling.    She is trying to lose weight.       Date Performed Result   PAP 2021     Partial Hysterectomy NILM; HPV Negative   Mammogram Scheduled for next Friday (2025)    Colonoscopy     Dexa         OB History          6    Para   4    Term   1       3    AB   2    Living   5         SAB   1    IAB        Ectopic        Molar        Multiple   1    Live Births   5            Her oldest is 15 and youngest is 5.    GYN History     No LMP recorded. Patient has had a hysterectomy.negative postcoital bleeding        Past Medical History:   Diagnosis Date    Abnormal Pap smear 2011 & 2015    HPV +    Abnormal Papanicolaou smear of cervix  &      2004    Anemia     in the past    Anxiety     Asthma     childhood, exercise induced as an adult    BMI 37.0-37.9, adult     BRCA1 negative     BRCA2 negative     Celiac disease     Depression     no meds    Endometriosis     Female dyspareunia     Genital herpes     Herpes simplex without mention of complication     Type 2    High-risk pregnancy in second trimester 2019    Flu vaccine given 10/16/19  2019 at [facility]:  Normal NT, NB present.  Genetic counseling done by physician.  Pt declines testing.  May lift up to 45# at work at this time; limit if concerns develop 2019 at [facility]:  Normal anatomy/fetal Echo.  Normal CL at 4.3 cm. Hx of PTL and delivery at 35 weeks in 2 pregnancies prior to last Twin Pregnancy. 10/3/2019 at [facility]: Normal CL 3.7 cm, no funneling    History of hyperthyroidism 2014    treated w/radiation    History of staph infection     Hypothyroidism     managed w/med    Infertility, female     Miscarriage     Open wound anterior abdominal wall, initial encounter 2023    Pelvic pain in female 10/08/2015    Postpartum depression     with

## 2025-04-04 ENCOUNTER — OFFICE VISIT (OUTPATIENT)
Dept: OBGYN CLINIC | Age: 40
End: 2025-04-04
Payer: COMMERCIAL

## 2025-04-04 VITALS
WEIGHT: 219.4 LBS | HEIGHT: 65 IN | SYSTOLIC BLOOD PRESSURE: 106 MMHG | BODY MASS INDEX: 36.55 KG/M2 | DIASTOLIC BLOOD PRESSURE: 78 MMHG

## 2025-04-04 DIAGNOSIS — Z12.4 SCREENING FOR CERVICAL CANCER: ICD-10-CM

## 2025-04-04 DIAGNOSIS — Z11.51 SCREENING FOR HUMAN PAPILLOMAVIRUS (HPV): ICD-10-CM

## 2025-04-04 DIAGNOSIS — Z13.89 SCREENING FOR GENITOURINARY CONDITION: ICD-10-CM

## 2025-04-04 DIAGNOSIS — Z01.419 WELL WOMAN EXAM: Primary | ICD-10-CM

## 2025-04-04 LAB
BILIRUBIN, URINE, POC: NEGATIVE
BLOOD URINE, POC: NEGATIVE
GLUCOSE URINE, POC: NEGATIVE MG/DL
KETONES, URINE, POC: NEGATIVE MG/DL
LEUKOCYTE ESTERASE, URINE, POC: NEGATIVE
NITRITE, URINE, POC: NEGATIVE
PH, URINE, POC: 6.5 (ref 4.6–8)
PROTEIN,URINE, POC: NEGATIVE MG/DL
SPECIFIC GRAVITY, URINE, POC: 1.02 (ref 1–1.03)
URINALYSIS CLARITY, POC: NORMAL
URINALYSIS COLOR, POC: NORMAL
UROBILINOGEN, POC: NORMAL MG/DL

## 2025-04-04 PROCEDURE — 81003 URINALYSIS AUTO W/O SCOPE: CPT | Performed by: OBSTETRICS & GYNECOLOGY

## 2025-04-04 PROCEDURE — 99395 PREV VISIT EST AGE 18-39: CPT | Performed by: OBSTETRICS & GYNECOLOGY

## 2025-04-11 ENCOUNTER — HOSPITAL ENCOUNTER (OUTPATIENT)
Dept: MAMMOGRAPHY | Age: 40
Discharge: HOME | End: 2025-04-14
Attending: OBSTETRICS & GYNECOLOGY
Payer: COMMERCIAL

## 2025-04-11 VITALS — HEIGHT: 65 IN | WEIGHT: 221 LBS | BODY MASS INDEX: 36.82 KG/M2

## 2025-04-11 DIAGNOSIS — Z12.31 OTHER SCREENING MAMMOGRAM: ICD-10-CM

## 2025-04-11 PROCEDURE — 77067 SCR MAMMO BI INCL CAD: CPT

## 2025-04-15 RX ORDER — VALACYCLOVIR HYDROCHLORIDE 500 MG/1
500 TABLET, FILM COATED ORAL 2 TIMES DAILY
Qty: 30 TABLET | Refills: 3 | Status: SHIPPED | OUTPATIENT
Start: 2025-04-15

## 2025-04-16 ENCOUNTER — RESULTS FOLLOW-UP (OUTPATIENT)
Dept: OBGYN CLINIC | Age: 40
End: 2025-04-16

## (undated) DEVICE — AIRSEAL 5 MM ACCESS PORT AND LOW PROFILE OBTURATOR WITH BLADELESS OPTICAL TIP, 120 MM LENGTH: Brand: AIRSEAL

## (undated) DEVICE — VISUALIZATION SYSTEM: Brand: CLEARIFY

## (undated) DEVICE — GENERAL LAPAROSCOPY: Brand: MEDLINE INDUSTRIES, INC.

## (undated) DEVICE — SURGICAL PROCEDURE PACK C SECT CDS

## (undated) DEVICE — PAD,NON-ADHERENT,3X8,STERILE,LF,1/PK: Brand: MEDLINE

## (undated) DEVICE — BLADE SURG NO15 C STL DISPOSABLE ST

## (undated) DEVICE — CARDINAL HEALTH FLEXIBLE LIGHT HANDLE COVER: Brand: CARDINAL HEALTH

## (undated) DEVICE — SHEARS ENDOSCP HARM 36CM ULTRASONIC CRV TIP UPGRD

## (undated) DEVICE — TROCAR: Brand: KII® SLEEVE

## (undated) DEVICE — HYDROPHILIC COATED RED RUBBER URETHRAL CATHETER, SMOOTH ROUNDED TIP, 12 FR (4.0 MM): Brand: DOVER

## (undated) DEVICE — REM POLYHESIVE ADULT PATIENT RETURN ELECTRODE: Brand: VALLEYLAB

## (undated) DEVICE — SOLUTION IV 1000ML 0.9% SOD CHL

## (undated) DEVICE — Device: Brand: PORTEX

## (undated) DEVICE — DRAPE TWL SURG 16X26IN BLU ORB04] ALLCARE INC]

## (undated) DEVICE — DRAPE, LAVH, STERILE: Brand: MEDLINE

## (undated) DEVICE — STERILE POLYISOPRENE POWDER-FREE SURGICAL GLOVES: Brand: PROTEXIS

## (undated) DEVICE — MASTISOL ADHESIVE LIQ 2/3ML

## (undated) DEVICE — GARMENT,MEDLINE,DVT,INT,CALF,LG, GEN2: Brand: MEDLINE

## (undated) DEVICE — TROCAR: Brand: KII FIOS FIRST ENTRY

## (undated) DEVICE — MESH HERN DIA4.5IN CIR W/ ECHO PS POS SYS VENTRALIGHT ST: Type: IMPLANTABLE DEVICE | Status: NON-FUNCTIONAL

## (undated) DEVICE — 2000CC GUARDIAN II: Brand: GUARDIAN

## (undated) DEVICE — SUTURE VCRL SZ 0 L27IN ABSRB UD L36MM CT-1 1/2 CIR J260H

## (undated) DEVICE — STRIP,CLOSURE,WOUND,MEDI-STRIP,1/2X4: Brand: MEDLINE

## (undated) DEVICE — SUTURE STRATAFIX SPRL PDS + SZ 2-0 L6IN ABSRB VLT L36MM SXPP1B409

## (undated) DEVICE — INTENDED FOR TISSUE SEPARATION, AND OTHER PROCEDURES THAT REQUIRE A SHARP SURGICAL BLADE TO PUNCTURE OR CUT.: Brand: BARD-PARKER SAFETY BLADES SIZE 11, STERILE

## (undated) DEVICE — CANISTER, RIGID, 2000CC: Brand: MEDLINE INDUSTRIES, INC.

## (undated) DEVICE — AMD ANTIMICROBIAL NON-ADHERENT PAD,0.2% POLYHEXAMETHYLENE BIGUANIDE HCI (PHMB): Brand: TELFA

## (undated) DEVICE — TOTAL 1-LAYER TRAY, LATEX FOLEY, 16FR 10: Brand: MEDLINE

## (undated) DEVICE — GOWN,REINF,POLY,ECL,PP SLV,XL: Brand: MEDLINE

## (undated) DEVICE — SUTURE VCRL SZ 0 L36IN ABSRB UD L36MM CT-1 1/2 CIR J946H

## (undated) DEVICE — (D)PREP SKN CHLRAPRP APPL 26ML -- CONVERT TO ITEM 371833

## (undated) DEVICE — TRAY PREP DRY W/ PREM GLV 2 APPL 6 SPNG 2 UNDPD 1 OVERWRAP

## (undated) DEVICE — CUTTER ENDOSCP L340MM LIN ARTC SGL STROKE FIRING ENDOPATH

## (undated) DEVICE — SINGLE BASIN: Brand: CARDINAL HEALTH

## (undated) DEVICE — DERMABOND SKIN ADH 0.7ML -- DERMABOND ADVANCED 12/BX

## (undated) DEVICE — SUTURE VCRL SZ 3-0 L27IN ABSRB UD L26MM SH 1/2 CIR J416H

## (undated) DEVICE — PREMIUM WET SKIN PREP TRAY: Brand: MEDLINE INDUSTRIES, INC.

## (undated) DEVICE — SUTURE PERMAHAND SZ 3-0 L18IN NONABSORBABLE BLK L26MM SH C013D

## (undated) DEVICE — SUTURE SZ 0 27IN 5/8 CIR UR-6  TAPER PT VIOLET ABSRB VICRYL J603H

## (undated) DEVICE — PENCIL ES L3M BTTN SWCH S STL HEX LOK BLDE ELECTRD HOLSTER

## (undated) DEVICE — KENDALL SCD EXPRESS SLEEVES, KNEE LENGTH, MEDIUM: Brand: KENDALL SCD

## (undated) DEVICE — TRAY CATH 16F DRN BG LTX -- CONVERT TO ITEM 363158

## (undated) DEVICE — SUTURE VCRL SZ 2-0 L36IN ABSRB UD L36MM CT-1 1/2 CIR J945H

## (undated) DEVICE — GYN LAPAROSCOPY: Brand: MEDLINE INDUSTRIES, INC.

## (undated) DEVICE — SOLUTION SALINE IRRIGATION 250ML STERILE

## (undated) DEVICE — BAG SPEC REM 224ML W4XL6IN DIA10MM 1 HND GYN DISP ENDOPCH

## (undated) DEVICE — TROCARS: Brand: KII® BLUNT TIP ACCESS SYSTEM

## (undated) DEVICE — SUTURE VCRL SZ 3-0 L36IN ABSRB UD L36MM CT-1 1/2 CIR J944H

## (undated) DEVICE — SUTURE MCRYL SZ 4-0 L27IN ABSRB UD L19MM PS-2 1/2 CIR PRIM Y426H

## (undated) DEVICE — MINOR SPLIT GENERAL: Brand: MEDLINE INDUSTRIES, INC.

## (undated) DEVICE — SOLUTION IRRIG 3000ML 0.9% SOD CHL FLX CONT 0797208] ICU MEDICAL INC]

## (undated) DEVICE — GLOVE SURG SZ 75 CRM LTX FREE POLYISOPRENE POLYMER BEAD ANTI

## (undated) DEVICE — 40585 XL ADVANCED TRENDELENBURG POSITIONING KIT: Brand: 40585 XL ADVANCED TRENDELENBURG POSITIONING KIT

## (undated) DEVICE — APPLICATOR MEDICATED 26 CC SOLUTION HI LT ORNG CHLORAPREP

## (undated) DEVICE — RELOAD STPL SZ 0 L45MM DIA3.5MM 0DEG STD REG TISS BLU TI

## (undated) DEVICE — LOGICUT SCISSOR LENGTH 320MM: Brand: LOGI - LAPAROSCOPIC INSTRUMENT SYSTEM

## (undated) DEVICE — INSUFFLATION NEEDLE TO ESTABLISH PNEUMOPERITONEUM.: Brand: INSUFFLATION NEEDLE

## (undated) DEVICE — MEDI-VAC NON-CONDUCTIVE SUCTION TUBING: Brand: CARDINAL HEALTH

## (undated) DEVICE — SUTURE VCRL SZ 1 L27IN ABSRB UD CT-1 L36MM 1/2 CIR J261H

## (undated) DEVICE — GAUZE,SPONGE,4"X4",16PLY,STRL,LF,10/TRAY: Brand: MEDLINE

## (undated) DEVICE — TUBING INSUFFLATION SMK EVAC HI FLO SET PNEUMOCLEAR

## (undated) DEVICE — GARMENT,MEDLINE,DVT,INT,CALF,MED, GEN2: Brand: MEDLINE

## (undated) DEVICE — SOLUTION IRRIG 1000ML 0.9% SOD CHL USP POUR PLAS BTL

## (undated) DEVICE — SYRINGE IRRIG 60ML SFT PLIABLE BLB EZ TO GRP 1 HND USE W/

## (undated) DEVICE — MAJOR GENERAL: Brand: MEDLINE INDUSTRIES, INC.

## (undated) DEVICE — AIRSEAL BIFURCATED SMOKE EVAC FILTERED TUBE SET: Brand: AIRSEAL

## (undated) DEVICE — SUT CHRMC 1 36IN CTX BRN --

## (undated) DEVICE — PAD MATERNITY 11IN W/TAILS -- STRL

## (undated) DEVICE — SUREFIT, DUAL DISPERSIVE ELECTRODE, CONTACT QUALITY MONITOR: Brand: SUREFIT

## (undated) DEVICE — SUTURE CHROMIC GUT SZ 2-0 L27IN ABSRB BRN L26MM SH 1/2 CIR G123H

## (undated) DEVICE — SUTURE PROL SZ 0 L30IN NONABSORBABLE BLU L26MM CT-2 1/2 CIR 8412H

## (undated) DEVICE — [HIGH FLOW INSUFFLATOR,  DO NOT USE IF PACKAGE IS DAMAGED,  KEEP DRY,  KEEP AWAY FROM SUNLIGHT,  PROTECT FROM HEAT AND RADIOACTIVE SOURCES.]: Brand: PNEUMOSURE

## (undated) DEVICE — SUTURE VCRL SZ 4-0 L27IN ABSRB UD L60MM KS STR REV CUT NDL J662H

## (undated) DEVICE — Device

## (undated) DEVICE — BUTTON SWITCH PENCIL BLADE ELECTRODE, HOLSTER: Brand: EDGE

## (undated) DEVICE — SHEET, T, LAPAROTOMY, STERILE: Brand: MEDLINE

## (undated) DEVICE — AMD ANTIMICROBIAL GAUZE SPONGES,12 PLY USP TYPE VII, 0.2% POLYHEXAMETHYLENE BIGUANIDE HCI (PHMB): Brand: CURITY

## (undated) DEVICE — GOWN,REINFORCED,POLY,AURORA,XXLARGE,STR: Brand: MEDLINE

## (undated) DEVICE — KIT,ANTI FOG,W/SPONGE & FLUID,SOFT PACK: Brand: MEDLINE

## (undated) DEVICE — SUTURE VCRL SZ 4-0 L27IN ABSRB UD L19MM PS-2 3/8 CIR PRIM J426H

## (undated) DEVICE — VCARE MEDIUM, UTERINE MANIPULATOR, VAGINAL-CERVICAL-AHLUWALIA'S-RETRACTOR-ELEVATOR: Brand: VCARE

## (undated) DEVICE — SUTURE CHROMIC GUT 2-0 CTX 36IN ABSRB MFIL 903H

## (undated) DEVICE — SHEARS ENDOSCP L36CM DIA5MM ULTRASONIC CRV TIP W/ ADV

## (undated) DEVICE — DISSECTOR ENDOSCP TIP DIA10MM BLNT ENDOPATH

## (undated) DEVICE — VCARE LARGE, UTERINE MANIPULATOR, VAGINAL-CERVICAL-AHLUWALIA'S-RETRACTOR-ELEVATOR: Brand: VCARE

## (undated) DEVICE — LAPAROSCOPIC TROCAR SLEEVE/SINGLE USE: Brand: KII® OPTICAL ACCESS SYSTEM

## (undated) DEVICE — NEEDLE HYPO 21GA L1.5IN INTRAMUSCULAR S STL LATCH BVL UP

## (undated) DEVICE — SUTURE COAT VCRL SZ 4-0 L18IN ABSRB UD L19MM PS-2 1/2 CIR J496G

## (undated) DEVICE — SUTURE PDS II SZ 1 L96IN ABSRB VLT TP-1 L65MM 1/2 CIR Z880G

## (undated) DEVICE — KIWI® VACUUM DELIVERY SYSTEM, OMNICUP®: Brand: KIWI® OMNICUP®

## (undated) DEVICE — CATH FOL TY IC BAG 16FR 2000ML -- CONVERT TO ITEM 363158

## (undated) DEVICE — 2, DISPOSABLE SUCTION/IRRIGATOR WITHOUT DISPOSABLE TIP: Brand: STRYKEFLOW

## (undated) DEVICE — CONTAINER SPEC FRMLN 120ML --

## (undated) DEVICE — DEVICE FIX 5MM TI FOR LAP HERN REP PROTACK

## (undated) DEVICE — INTENDED FOR TISSUE SEPARATION, AND OTHER PROCEDURES THAT REQUIRE A SHARP SURGICAL BLADE TO PUNCTURE OR CUT.: Brand: BARD-PARKER ® STAINLESS STEEL BLADES

## (undated) DEVICE — SOLUTION IRRIG 1000ML H2O STRL BLT